# Patient Record
Sex: FEMALE | Race: WHITE | Employment: OTHER | URBAN - METROPOLITAN AREA
[De-identification: names, ages, dates, MRNs, and addresses within clinical notes are randomized per-mention and may not be internally consistent; named-entity substitution may affect disease eponyms.]

---

## 2017-02-08 ENCOUNTER — OFFICE VISIT (OUTPATIENT)
Dept: FAMILY MEDICINE | Facility: CLINIC | Age: 32
End: 2017-02-08
Payer: COMMERCIAL

## 2017-02-08 VITALS
SYSTOLIC BLOOD PRESSURE: 110 MMHG | TEMPERATURE: 98.4 F | HEIGHT: 66 IN | HEART RATE: 60 BPM | WEIGHT: 125 LBS | DIASTOLIC BLOOD PRESSURE: 68 MMHG | BODY MASS INDEX: 20.09 KG/M2

## 2017-02-08 DIAGNOSIS — N80.9 ENDOMETRIOSIS: ICD-10-CM

## 2017-02-08 DIAGNOSIS — Z00.01 ENCOUNTER FOR GENERAL ADULT MEDICAL EXAMINATION WITH ABNORMAL FINDINGS: Primary | ICD-10-CM

## 2017-02-08 LAB
CHOLEST SERPL-MCNC: 142 MG/DL
GLUCOSE SERPL-MCNC: 82 MG/DL (ref 70–99)
HDLC SERPL-MCNC: 60 MG/DL
LDLC SERPL CALC-MCNC: 73 MG/DL
NONHDLC SERPL-MCNC: 82 MG/DL
TRIGL SERPL-MCNC: 46 MG/DL

## 2017-02-08 PROCEDURE — 82947 ASSAY GLUCOSE BLOOD QUANT: CPT | Performed by: NURSE PRACTITIONER

## 2017-02-08 PROCEDURE — 87624 HPV HI-RISK TYP POOLED RSLT: CPT | Performed by: NURSE PRACTITIONER

## 2017-02-08 PROCEDURE — 99385 PREV VISIT NEW AGE 18-39: CPT | Performed by: NURSE PRACTITIONER

## 2017-02-08 PROCEDURE — 99213 OFFICE O/P EST LOW 20 MIN: CPT | Mod: 25 | Performed by: NURSE PRACTITIONER

## 2017-02-08 PROCEDURE — 80061 LIPID PANEL: CPT | Performed by: NURSE PRACTITIONER

## 2017-02-08 PROCEDURE — G0145 SCR C/V CYTO,THINLAYER,RESCR: HCPCS | Performed by: NURSE PRACTITIONER

## 2017-02-08 PROCEDURE — 36415 COLL VENOUS BLD VENIPUNCTURE: CPT | Performed by: NURSE PRACTITIONER

## 2017-02-08 NOTE — LETTER
[address] [address]  [phone]      February 16, 2017    Debbie Gage  [address] [place] [address]    Dear Debbie,  We are happy to inform you that your PAP smear result from 2/8/17 is normal.  We are now able to do a follow up test on PAP smears. The DNA test is for HPV (Human Papilloma Virus). Cervical cancer is closely linked with certain types of HPV. Your result showed no evidence of high risk HPV.  Therefore we recommend you return in 3 years for your next pap smear and HPV test.  You will still need to return to the clinic every year for an annual exam and other preventive tests.  Please contact the clinic with any questions.  Sincerely,  CHRISSY Smith CNP/rlm

## 2017-02-08 NOTE — Clinical Note
[address] [address]  Phone: [phone]  Fax: [phone]          February 10, 2017    Debbie Gage  [address] [facility] [address]          Dear Debbie,      LAB RESULTS:     Your blood sugar and lipid profile are well within normal limits.    If you have any questions/concerns, feel free to call the clinic.    Sincerely,      Shelli Morrison, NP care team

## 2017-02-08 NOTE — PROGRESS NOTES
SUBJECTIVE:     CC: Debbie Gage is an 31 year old woman who presents for preventive health visit.     Healthy Habits:    Do you get at least three servings of calcium containing foods daily (dairy, green leafy vegetables, etc.)? yes    Amount of exercise or daily activities, outside of work: running with children, works at     Problems taking medications regularly No    Medication side effects: No    Have you had an eye exam in the past two years? no    Do you see a dentist twice per year? yes    Do you have sleep apnea, excessive snoring or daytime drowsiness?no        \    Today's PHQ-2 Score:   PHQ-2 ( 1999 [employer]) 2/8/2017 10/10/2011   Q1: Little interest or pleasure in doing things 0 0   Q2: Feeling down, depressed or hopeless 0 0   PHQ-2 Score 0 0       Abuse: Current or Past(Physical, Sexual or Emotional)- No  Do you feel safe in your environment - Yes    Social History   Substance Use Topics     Smoking status: Never Smoker      Smokeless tobacco: Current User     Alcohol Use: 0.0 oz/week     0 Standard drinks or equivalent per week      Comment: occas     The patient does not drink >3 drinks per day nor >7 drinks per week.    No results for input(s): CHOL, HDL, LDL, TRIG, CHOLHDLRATIO, NHDL in the last 32308 hours.    Reviewed orders with patient.  Reviewed health maintenance and updated orders accordingly - Yes    Mammo Decision Support:  Mammogram not appropriate for this patient based on age.    Pertinent mammograms are reviewed under the imaging tab.  History of abnormal Pap smear: History of abnormal Pap smear, had LEEP procedure in 2010. She doesn't recall the results of that biopsy, but did have all of her follow-up Pap smears, all of which were negative. She has been back on routine screening  All Histories reviewed and updated in Epic.  Past Medical History   Diagnosis Date     Abnormal Pap smear of cervix      LEEP DONE 2009     Endometriosis       Past Surgical History    Procedure Laterality Date     Gyn surgery       laparoscopy x 4     Orthopedic surgery       ankle edwin surgery (tendon repair)     Ent surgery       wisdom teeth extraction     As enlarge breast with implant        silicone implants     Cystectomy ovarian benign       Leep tx, cervical  2010      section       X2     Obstetric History       T0      TAB0   SAB0   E0   M0   L2       # Outcome Date GA Lbr Justino/2nd Weight Sex Delivery Anes PTL Lv   2 Para            1 Para                   ROS:  C: NEGATIVE for fever, chills, change in weight  I: NEGATIVE for worrisome rashes, moles or lesions  E: NEGATIVE for vision changes or irritation  ENT: NEGATIVE for ear, mouth and throat problems  R: NEGATIVE for significant cough or SOB  B: NEGATIVE for masses, tenderness or discharge  CV: NEGATIVE for chest pain, palpitations or peripheral edema  GI: NEGATIVE for nausea, abdominal pain, heartburn, or change in bowel habits   female: POSITIVE for history of endometriosis with chronic pelvic pain.  M: NEGATIVE for significant arthralgias or myalgia  N: NEGATIVE for weakness, dizziness or paresthesias  P: NEGATIVE for changes in mood or affect    She reports history of endometriosis dating back to her teen years. At one time was on Desogen which seemed to work well managing her symptoms. She has had 3 or 4 laparoscopies, at one time states endometrial tissue had attached  To her ovary and to her bowel. Symptoms resolved with her first pregnancy, but when the child was about a year old symptoms recurred. She was given options of managing with Lupron, oral contraceptives, or pregnancy. She tried birth control pills once again, but developed severe headaches in response to any oral contraceptives. She did become pregnant, symptoms once again resolved until the child was about a year and half old. Presently is having a lot of pelvic pain. Would like referral to gynecology.  She is uncertain as to just  "what she is wanting to proceed with as far as management. She isn't absolutely certain she doesn't want more children, states they still consider having another child. However, the chronic pain is very difficult to live with as well. She would like consultation to discuss in more detail the different options available to her    Problem list, Medication list, Allergies, and Medical/Social/Surgical histories reviewed in [facility] and updated as appropriate.  OBJECTIVE:     /68 mmHg  Pulse 60  Temp(Src) 98.4  F (36.9  C) (Tympanic)  Ht 5' 5.6\" (1.666 m)  Wt 125 lb (56.7 kg)  BMI 20.43 kg/m2  LMP 02/01/2017  EXAM:  GENERAL: healthy, alert and no distress  EYES: Eyes grossly normal to inspection, PERRL and conjunctivae and sclerae normal  HENT: ear canals and TM's normal, nose and mouth without ulcers or lesions  NECK: no adenopathy, no asymmetry, masses, or scars and thyroid normal to palpation  RESP: lungs clear to auscultation - no rales, rhonchi or wheezes  BREAST: normal without masses, tenderness or nipple discharge and no palpable axillary masses or adenopathy  CV: regular rate and rhythm, normal S1 S2, no S3 or S4, no murmur, click or rub, no peripheral edema and peripheral pulses strong  ABDOMEN: Soft, flat. Bowel sounds present throughout. No masses, no organomegaly. Patient is very tender across the entire lower abdomen and pelvis to palpation and percussion   (female): Normal external genitalia, BUS. Vaginal mucosa appears normal, well rugated, without lesion. Cervix is displaced to the lower right. Smooth, round, without polyp or lesion. No friability. Bimanual exam is negative for cervical motion tenderness. Very tender across the lower pelvic segment.  MS: no gross musculoskeletal defects noted, no edema  SKIN: no suspicious lesions or rashes  NEURO: Normal strength and tone, mentation intact and speech normal  PSYCH: mentation appears normal, affect normal/bright    ASSESSMENT/PLAN:     1. " "Encounter for general adult medical examination with abnormal findings  Recommend annual well exam with Pap and HPV testing every 5 years if negative.  - Lipid panel reflex to direct LDL  - Glucose  - Pap imaged thin layer screen with HPV - recommended age 30 - 65 years (select HPV order below)  - HPV High Risk Types DNA Cervical    2. Endometriosis  - OB/GYN REFERRAL  - US Pelvic Complete w Transvaginal; Future    COUNSELING:   Reviewed preventive health counseling, as reflected in patient instructions       Regular exercise       Healthy diet/nutrition       Osteoporosis Prevention/Bone Health         reports that she has never smoked. She uses smokeless tobacco.  Tobacco Cessation Action Plan: Information offered: Patient not interested at this time  Estimated body mass index is 20.43 kg/(m^2) as calculated from the following:    Height as of this encounter: 5' 5.6\" (1.666 m).    Weight as of this encounter: 125 lb (56.7 kg).       Counseling Resources:  ATP IV Guidelines  Pooled Cohorts Equation Calculator  Breast Cancer Risk Calculator  FRAX Risk Assessment  ICSI Preventive Guidelines  Dietary Guidelines for Americans, 2010  USDA's MyPlate  ASA Prophylaxis  Lung CA Screening    CHRISSY Smith CNP  [facility]  "

## 2017-02-08 NOTE — NURSING NOTE
"Chief Complaint   Patient presents with     Physical       Initial /68 mmHg  Pulse 60  Temp(Src) 98.4  F (36.9  C) (Tympanic)  Ht 5' 5.6\" (1.666 m)  Wt 125 lb (56.7 kg)  BMI 20.43 kg/m2  LMP 02/01/2017 Estimated body mass index is 20.43 kg/(m^2) as calculated from the following:    Height as of this encounter: 5' 5.6\" (1.666 m).    Weight as of this encounter: 125 lb (56.7 kg).  Medication Reconciliation: complete  "

## 2017-02-08 NOTE — MR AVS SNAPSHOT
After Visit Summary   2/8/2017    Debbie Gage    MRN: [id number]           Patient Information     Date Of Birth          1985        Visit Information        Provider Department      2/8/2017 9:00 AM Shelli Morrison APRN [facility]        Today's Diagnoses     Encounter for general adult medical examination with abnormal findings    -  1     Endometriosis           Care Instructions      Preventive Health Recommendations  Female Ages 26 - 39  Yearly exam:   See your health care provider every year in order to    Review health changes.     Discuss preventive care.      Review your medicines if you your doctor has prescribed any.    Until age 30: Get a Pap test every three years (more often if you have had an abnormal result).    After age 30: Talk to your doctor about whether you should have a Pap test every 3 years or have a Pap test with HPV screening every 5 years.   You do not need a Pap test if your uterus was removed (hysterectomy) and you have not had cancer.  You should be tested each year for STDs (sexually transmitted diseases), if you're at risk.   Talk to your provider about how often to have your cholesterol checked.  If you are at risk for diabetes, you should have a diabetes test (fasting glucose).  Shots: Get a flu shot each year. Get a tetanus shot every 10 years.   Nutrition:     Eat at least 5 servings of fruits and vegetables each day.    Eat whole-grain bread, whole-wheat pasta and brown rice instead of white grains and rice.    Talk to your provider about Calcium and Vitamin D.     Lifestyle    Exercise at least 150 minutes a week (30 minutes a day, 5 days of the week). This will help you control your weight and prevent disease.    Limit alcohol to one drink per day.    No smoking.     Wear sunscreen to prevent skin cancer.    See your dentist every six months for an exam and cleaning.            Follow-ups after your visit        Additional  Services     OB/GYN REFERRAL       Your provider has referred you to:  [facility]: [facility] [place] [phone]   [email or web address]    Please be aware that coverage of these services is subject to the terms and limitations of your health insurance plan.  Call member services at your health plan with any benefit or coverage questions.      Please bring the following with you to your appointment:    (1) Any X-Rays, CTs or MRIs which have been performed.  Contact the facility where they were done to arrange for  prior to your scheduled appointment.   (2) List of current medications   (3) This referral request   (4) Any documents/labs given to you for this referral                  Your next 10 appointments already scheduled     Feb 15, 2017  2:10 PM   US PELVIC COMPLETE W TRANSVAGINAL with PHUS1   [facility] ([facility])    [address] [place] [address] [phone]           Please bring a list of your medicines (including vitamins, minerals and over-the-counter drugs). Also, tell your doctor about any allergies you may have. Wear comfortable clothes and leave your valuables at home.  Adults: Drink four 8-ounce glasses of fluid one hour before your exam. Do NOT empty your bladder.  If you need to empty your bladder before your exam, try to release only a little bit of urine. Then, drink another 8oz glass of fluid.  Children: Children who are potty trained should drink at least 4 cups (32 oz) of liquid 45 minutes to one hour prior to the exam. The child s bladder must be full in order to achieve a diagnostic exam. If your child is very uncomfortable or has an urgent need to pee, please notify a technologist; they will try to find out how much longer the wait may be and provide instructions to help relieve the pressure. Occasionally it is medically necessary to insert a urinary catheter to fill the bladder.   "Please call the Imaging Department at your exam site with any questions.            Feb 22, 2017 10:10 AM   [facility]    [address] [place] [address] [phone]           Bring a current list of meds and any records pertaining to this visit.  For Physicals, please bring immunization records and any forms needing to be filled out.  Please arrive 10 minutes early to complete paperwork.              Future tests that were ordered for you today     Open Future Orders        Priority Expected Expires Ordered    US Pelvic Complete w Transvaginal Routine  2/8/2018 2/8/2017            Who to contact     If you have questions or need follow up information about today's clinic visit or your schedule please contact [facility] directly at [phone].  Normal or non-critical lab and imaging results will be communicated to you by MyChart, letter or phone within 4 business days after the clinic has received the results. If you do not hear from us within 7 days, please contact the clinic through MyChart or phone. If you have a critical or abnormal lab result, we will notify you by phone as soon as possible.  Submit refill requests through HowGood or call your pharmacy and they will forward the refill request to us. Please allow 3 business days for your refill to be completed.          Additional Information About Your Visit        MyCharAltia Systems Information     HowGood lets you send messages to your doctor, view your test results, renew your prescriptions, schedule appointments and more. To sign up, go to [email or web address] . Click on \"Log in\" on the left side of the screen, which will take you to the Welcome page. Then click on \"Sign up Now\" on the right side of the page.     You will be asked to enter the access code listed below, as well as some personal information. Please follow the directions to " "create your username and password.     Your access code is: [email or web address]  Expires: 2017  9:48 AM     Your access code will  in 90 days. If you need help or a new code, please call your [place] clinic or [phone].        Care EveryWhere ID     This is your Care EveryWhere ID. This could be used by other organizations to access your [place] medical records  [id number]        Your Vitals Were     Pulse Temperature Height BMI (Body Mass Index) Last Period       60 98.4  F (36.9  C) (Tympanic) 5' 5.6\" (1.666 m) 20.43 kg/m2 2017        Blood Pressure from Last 3 Encounters:   17 110/68   16 116/73   16 97/57    Weight from Last 3 Encounters:   17 125 lb (56.7 kg)   16 120 lb (54.432 kg)   16 119 lb 11.2 oz (54.296 kg)              We Performed the Following     Glucose     HPV High Risk Types DNA Cervical     Lipid panel reflex to direct LDL     OB/GYN REFERRAL     Pap imaged thin layer screen with HPV - recommended age 30 - 65 years (select HPV order below)        Primary Care Provider Office Phone # Fax #    CHRISSY Smith [facility] [phone]       [facility]  [facility] [address] [place] [address]        Thank you!     Thank you for choosing [facility]  for your care. Our goal is always to provide you with excellent care. Hearing back from our patients is one way we can continue to improve our services. Please take a few minutes to complete the written survey that you may receive in the mail after your visit with us. Thank you!             Your Updated Medication List - Protect others around you: Learn how to safely use, store and throw away your medicines at www.disposemymeds.org.          This list is accurate as of: 17  9:48 AM.  Always use your most recent med list.                   Brand Name Dispense Instructions for use    NO ACTIVE MEDICATIONS            "

## 2017-02-10 LAB
COPATH REPORT: NORMAL
PAP: NORMAL

## 2017-02-13 LAB
FINAL DIAGNOSIS: NORMAL
HPV HR 12 DNA CVX QL NAA+PROBE: NEGATIVE
HPV16 DNA SPEC QL NAA+PROBE: NEGATIVE
HPV18 DNA SPEC QL NAA+PROBE: NEGATIVE
SPECIMEN DESCRIPTION: NORMAL

## 2017-02-22 ENCOUNTER — HOSPITAL ENCOUNTER (OUTPATIENT)
Dept: ULTRASOUND IMAGING | Facility: CLINIC | Age: 32
Discharge: HOME | End: 2017-02-22
Attending: NURSE PRACTITIONER | Admitting: NURSE PRACTITIONER
Payer: COMMERCIAL

## 2017-02-22 ENCOUNTER — TELEPHONE (OUTPATIENT)
Dept: OBGYN | Facility: CLINIC | Age: 32
End: 2017-02-22

## 2017-02-22 ENCOUNTER — OFFICE VISIT (OUTPATIENT)
Dept: FAMILY MEDICINE | Facility: CLINIC | Age: 32
End: 2017-02-22
Payer: COMMERCIAL

## 2017-02-22 VITALS
TEMPERATURE: 98 F | HEART RATE: 94 BPM | WEIGHT: 124 LBS | SYSTOLIC BLOOD PRESSURE: 82 MMHG | RESPIRATION RATE: 16 BRPM | DIASTOLIC BLOOD PRESSURE: 54 MMHG | OXYGEN SATURATION: 100 % | BODY MASS INDEX: 20.26 KG/M2

## 2017-02-22 DIAGNOSIS — N80.9 ENDOMETRIOSIS: Primary | ICD-10-CM

## 2017-02-22 DIAGNOSIS — R68.89 FLU-LIKE SYMPTOMS: ICD-10-CM

## 2017-02-22 DIAGNOSIS — N80.9 ENDOMETRIOSIS: ICD-10-CM

## 2017-02-22 DIAGNOSIS — R10.2 PELVIC PAIN IN FEMALE: Primary | ICD-10-CM

## 2017-02-22 DIAGNOSIS — R07.0 THROAT PAIN: ICD-10-CM

## 2017-02-22 LAB
DEPRECATED S PYO AG THROAT QL EIA: NORMAL
FLUAV+FLUBV AG SPEC QL: NEGATIVE
FLUAV+FLUBV AG SPEC QL: NORMAL
MICRO REPORT STATUS: NORMAL
SPECIMEN SOURCE: NORMAL
SPECIMEN SOURCE: NORMAL

## 2017-02-22 PROCEDURE — 76830 TRANSVAGINAL US NON-OB: CPT

## 2017-02-22 PROCEDURE — 87804 INFLUENZA ASSAY W/OPTIC: CPT | Performed by: OBSTETRICS & GYNECOLOGY

## 2017-02-22 PROCEDURE — 87081 CULTURE SCREEN ONLY: CPT | Performed by: OBSTETRICS & GYNECOLOGY

## 2017-02-22 PROCEDURE — 87880 STREP A ASSAY W/OPTIC: CPT | Performed by: OBSTETRICS & GYNECOLOGY

## 2017-02-22 PROCEDURE — 99215 OFFICE O/P EST HI 40 MIN: CPT | Performed by: OBSTETRICS & GYNECOLOGY

## 2017-02-22 ASSESSMENT — PAIN SCALES - GENERAL: PAINLEVEL: MODERATE PAIN (4)

## 2017-02-22 NOTE — MR AVS SNAPSHOT
"              After Visit Summary   2/22/2017    Debbie Gage    MRN: [id number]           Patient Information     Date Of Birth          1985        Visit Information        Provider Department      2/22/2017 10:10 AM Ranjith King MD [facility]        Today's Diagnoses     Flu-like symptoms    -  1    Throat pain           Follow-ups after your visit        Your next 10 appointments already scheduled     Mar 01, 2017  9:15 AM CST   Pre-Op physical with CHRISSY Smith CNP   [facility] ([facility])    [address] [place] [id number] [phone]            Mar 09, 2017 10:10 AM CST   SHORT with Ranjith King MD   [facility] ([address] [address] [phone]              Who to contact     If you have questions or need follow up information about today's clinic visit or your schedule please contact [facility] directly at [phone].  Normal or non-critical lab and imaging results will be communicated to you by Ecoviatehart, letter or phone within 4 business days after the clinic has received the results. If you do not hear from us within 7 days, please contact the clinic through Ecoviatehart or phone. If you have a critical or abnormal lab result, we will notify you by phone as soon as possible.  Submit refill requests through Butter or call your pharmacy and they will forward the refill request to us. Please allow 3 business days for your refill to be completed.          Additional Information About Your Visit        Ecoviatehart Information     Butter lets you send messages to your doctor, view your test results, renew your prescriptions, schedule appointments and more. To sign up, go to [email or web address] . Click on \"Log in\" on the left side of the screen, which will take you to the Welcome page. Then click on \"Sign up Now\" on " the right side of the page.     You will be asked to enter the access code listed below, as well as some personal information. Please follow the directions to create your username and password.     Your access code is: [email or web address]  Expires: 2017  9:48 AM     Your access code will  in 90 days. If you need help or a new code, please call your [place] clinic or [phone].        Care EveryWhere ID     This is your Care EveryWhere ID. This could be used by other organizations to access your [place] medical records  [id number]        Your Vitals Were     Pulse Temperature Respirations Last Period Pulse Oximetry Breastfeeding?    94 98  F (36.7  C) (Tympanic) 16 2017 100% No    BMI (Body Mass Index)                   20.26 kg/m2            Blood Pressure from Last 3 Encounters:   17 (!) 82/54   17 110/68   16 116/73    Weight from Last 3 Encounters:   17 124 lb (56.2 kg)   17 125 lb (56.7 kg)   16 120 lb (54.4 kg)              We Performed the Following     Beta strep group A culture     Influenza A/B antigen     Strep, Rapid Screen        Primary Care Provider Office Phone # Fax #    CHRISSY Smith [facility] [phone]       [facility]  [facility] [address] [place] [address]        Thank you!     Thank you for choosing [facility]  for your care. Our goal is always to provide you with excellent care. Hearing back from our patients is one way we can continue to improve our services. Please take a few minutes to complete the written survey that you may receive in the mail after your visit with us. Thank you!             Your Updated Medication List - Protect others around you: Learn how to safely use, store and throw away your medicines at www.disposemymeds.org.          This list is accurate as of: 17 11:12 AM.  Always use your most recent med list.                   Brand Name Dispense Instructions for use     DAYQUIL LIQUICAPS OR

## 2017-02-22 NOTE — TELEPHONE ENCOUNTER
Surgery Scheduled    Date of Surgery 3/6/17 Time of Surgery 8:00am  Procedure: Laparoscopy, Hysteroscopy, Dilation and Curettage and insertion of Mirana IUD  Hospital/Surgical Facility: [facility]  Surgeon: Dr. King, with Dr. Maria Assisting  Type of Anesthesia Anticipated: GETA  Pre-Op: 3/1/17 with Shelli Morrison   Pre-Op Labs: 3/4/17  Post-Op: 3/9/17 with Dr. King  Consent Signed 2/22/17      Surgery packet given to patient at consent signing. Patient instructed to arrive 1 1/2 hour(s) prior to surgery.  Patient understood and agrees to the plan.      Inder Hetcor MA

## 2017-02-22 NOTE — PROGRESS NOTES
SUBJECTIVE:                                                      Referral from Shelli Morrison       Reason for consultation:  Pelvic pain, history of endometriosis    History:  Debbie  Is a 31 year old female  2 para ( 2 CS )   who presents today because of pelvic pain for the past few months which is worse lately. She has a history of endometriosis which was first dx at age 17. Her first laparoscopy was at age 18. It showed moderate disease. She has tried different oral contraceptives. At least 4 times. Nothing has seen to help. Her last laparoscopy was in   And it showed a softball sized endometrioma on the right adnexa. It was removed and afterwards she conceived. The endometriosis pain was  Markedly improved during pregnancies and for about a year after each delivery she felt fine. Her last delivery was  she had another year of lupus but over the past few months since last fall  She has pain on the right side in the pelvis.  She had an ultrasound yesterday and had significant pelvic pain during that test and therefore she declines a pelvic exam today      Recent pelvic US showed: (this is my summary of the findings) :  The uterus measures 8.5 x 4.3 x 5.7 cm the endometrium is 6 mm and the ovaries look normal.         She was offered Lupron in the past and she declined. She was advised to consider conception  As a treatment. She is seriously considering that currently.    Also she has a runny nose and head congestion and a mild cough and sore throat and so she wants strep and influenza testing today        Patient Active Problem List   Diagnosis     Endometriosis     S/P LEEP of cervix     Past Surgical History   Procedure Laterality Date     Gyn surgery       laparoscopy x 4     Orthopedic surgery       ankle edwin surgery (tendon repair)     Ent surgery       wisdom teeth extraction     As enlarge breast with implant        silicone implants     Cystectomy ovarian benign        Leep tx, cervical  2010      section       X2       Social History   Substance Use Topics     Smoking status: Never Smoker     Smokeless tobacco: Current User     Alcohol use 0.0 oz/week     0 Standard drinks or equivalent per week      Comment: occas     Family History   Problem Relation Age of Onset     Other Cancer Maternal Grandmother      ovarian     DIABETES Maternal Grandmother      DIABETES Maternal Grandfather      Colon Cancer Paternal Grandfather          Current Outpatient Prescriptions   Medication Sig Dispense Refill     Pseudoephedrine-DM-GG-APAP (DAYQUIL LIQUICAPS OR)          ROS:  A 12 point systems review is negative except for what is listed above in the Subjective history.            OBJECTIVE:                                                    Vital signs: Blood pressure (!) 82/54, pulse 94, temperature 98  F (36.7  C), temperature source Tympanic, resp. rate 16, weight 124 lb (56.2 kg), last menstrual period 2017, SpO2 100 %, not currently breastfeeding.        HEENT is normal.      Neck is supple, mobile, no adenoapthy or masses palpable. Normal range of motion noted.     Chest is clear to auscultation. No wheezes, rales or rhonchi heard.  cardiac exam is normal with s1, s2, no murmurs or adventitious sounds.Normal rate and rhythm is heard.     Abdomen is soft,  nondistended, No masses felt.No HSM. No guarding or rigidity or rebound noted. Palpation reveals   tenderness  iin both lower quadrants especially on the right side. Normal bowel sounds heard.     Pelvic exam: sshe declined an exam today      Rapid strep test is negaive  The rapid flu test result is negative             ASSESSMENT/PLAN:                                                    1.31-year-old female  2 para 2 with a history of endometriosis which was diagnosed by laparoscopy. She has recurrent pain especially in the right adnexal area. I suspect she has recurrent endometriosis.        She probably has  viral URI- reassured about that                                                              A total of 45 minutes were spent face-to-face with this patient during today's consultation, with more than 50% of that time devoted to conversation and counseling about the management decisions.    We discussed options for treatment including pregnancy, Mirena IUD, depo=provera, and hysterectomy/BSO. She wants a laparoscopy to see if we can treat her more intense right pelvic pain.then she wants the Mirena IUD. Therefore i suggested laparoscopy and Hysteroscopy, dilatation and curettage with Mirena IUD at the same time.    Laparoscopy was discussed with the patient in detail today. The purpose of the surgery is to treat her pain/endometriosis and also confirm the diagnosis- she knows she may lose the right ovary if we see severe cyst or other finding on that side-or even on the left side since that hurts also.  Risks include but are not limited to bleeding, infection, injury to bowel and bladder and other organs. There is a chance that laparotomy will be needed if I suspect that an injury has occurred or if I see a problems such as a bleeding ovary or a situation that needs to be addressed urgently.She read over the consent form and signed it , witnessed by my nurse. I offered her a second opinion.     The patient was give a diagram describing the d and c procedure and then we went over the consent form together. We discussed risks which include but are not limited to bleeding, infection, possible uterine perforation, possible need for laparoscopy or laparotomy, possible anesthesia risks, and possible inability to dilate the uterus due to anatomical considerations. She signed the consent, witnessed by my nurse. She will have a preop physical with Shelli VILLASENOR  and she knows to be NPO for 8 hours before surgery and to bring a .        Thank you for this consultation.    Copy to  Shelli Morrison  Fernie King MD  [facility]

## 2017-02-22 NOTE — NURSING NOTE
"Chief Complaint   Patient presents with     Consult     endometriosis       Initial BP (!) 82/54 (BP Location: Right arm, Patient Position: Chair, Cuff Size: Adult Regular)  Pulse 94  Temp 98  F (36.7  C) (Tympanic)  Resp 16  Wt 124 lb (56.2 kg)  LMP 02/01/2017  SpO2 100%  Breastfeeding? No  BMI 20.26 kg/m2 Estimated body mass index is 20.26 kg/(m^2) as calculated from the following:    Height as of 2/8/17: 5' 5.6\" (1.666 m).    Weight as of this encounter: 124 lb (56.2 kg).  Medication Reconciliation: complete   Inder Hector MA      "

## 2017-02-24 LAB
BACTERIA SPEC CULT: NORMAL
MICRO REPORT STATUS: NORMAL
SPECIMEN SOURCE: NORMAL

## 2017-03-01 ENCOUNTER — OFFICE VISIT (OUTPATIENT)
Dept: FAMILY MEDICINE | Facility: CLINIC | Age: 32
End: 2017-03-01
Payer: COMMERCIAL

## 2017-03-01 VITALS
TEMPERATURE: 98.8 F | DIASTOLIC BLOOD PRESSURE: 62 MMHG | BODY MASS INDEX: 20.26 KG/M2 | HEART RATE: 80 BPM | WEIGHT: 124 LBS | SYSTOLIC BLOOD PRESSURE: 90 MMHG

## 2017-03-01 DIAGNOSIS — Z01.818 PREOP GENERAL PHYSICAL EXAM: Primary | ICD-10-CM

## 2017-03-01 DIAGNOSIS — N80.9 ENDOMETRIOSIS: ICD-10-CM

## 2017-03-01 DIAGNOSIS — J20.9 ACUTE BRONCHITIS, UNSPECIFIED ORGANISM: ICD-10-CM

## 2017-03-01 LAB
ERYTHROCYTE [DISTWIDTH] IN BLOOD BY AUTOMATED COUNT: 12.9 % (ref 10–15)
HCT VFR BLD AUTO: 41.2 % (ref 35–47)
HGB BLD-MCNC: 13.7 G/DL (ref 11.7–15.7)
MCH RBC QN AUTO: 29 PG (ref 26.5–33)
MCHC RBC AUTO-ENTMCNC: 33.3 G/DL (ref 31.5–36.5)
MCV RBC AUTO: 87 FL (ref 78–100)
PLATELET # BLD AUTO: 274 10E9/L (ref 150–450)
RBC # BLD AUTO: 4.72 10E12/L (ref 3.8–5.2)
WBC # BLD AUTO: 4.5 10E9/L (ref 4–11)

## 2017-03-01 PROCEDURE — 85027 COMPLETE CBC AUTOMATED: CPT | Performed by: NURSE PRACTITIONER

## 2017-03-01 PROCEDURE — 99214 OFFICE O/P EST MOD 30 MIN: CPT | Performed by: NURSE PRACTITIONER

## 2017-03-01 PROCEDURE — 36415 COLL VENOUS BLD VENIPUNCTURE: CPT | Performed by: NURSE PRACTITIONER

## 2017-03-01 RX ORDER — AZITHROMYCIN 250 MG/1
TABLET, FILM COATED ORAL
Qty: 6 TABLET | Refills: 0 | Status: ON HOLD | OUTPATIENT
Start: 2017-03-01 | End: 2017-03-06

## 2017-03-01 NOTE — NURSING NOTE
"Chief Complaint   Patient presents with     Pre-Op Exam       Initial LMP 02/01/2017 Estimated body mass index is 20.26 kg/(m^2) as calculated from the following:    Height as of 2/8/17: 5' 5.6\" (1.666 m).    Weight as of 2/22/17: 124 lb (56.2 kg).  Medication Reconciliation: complete  "

## 2017-03-01 NOTE — MR AVS SNAPSHOT
After Visit Summary   3/1/2017    Debbie Gage    MRN: [id number]           Patient Information     Date Of Birth          1985        Visit Information        Provider Department      3/1/2017 9:15 AM Shelli Morrison APRN [facility]        Today's Diagnoses     Preop general physical exam    -  1    Endometriosis        Acute bronchitis, unspecified organism          Care Instructions      Before Your Surgery      Call your surgeon if there is any change in your health. This includes signs of a cold or flu (such as a sore throat, runny nose, cough, rash or fever).    Do not smoke, drink alcohol or take over the counter medicine (unless your surgeon or primary care doctor tells you to) for the 24 hours before and after surgery.    If you take prescribed drugs: Follow your doctor s orders about which medicines to take and which to stop until after surgery.    Eating and drinking prior to surgery: follow the instructions from your surgeon    Take a shower or bath the night before surgery. Use the soap your surgeon gave you to gently clean your skin. If you do not have soap from your surgeon, use your regular soap. Do not shave or scrub the surgery site.  Wear clean pajamas and have clean sheets on your bed.         Follow-ups after your visit        Your next 10 appointments already scheduled     Mar 04, 2017  8:00 AM CST   LAB with NL LAB [facility]    [address] [place] [id number] [phone]           Patient must bring picture ID.  Patient should be prepared to give a urine specimen  Please do not eat 10-12 hours before your appointment if you are coming in fasting for labs on lipids, cholesterol, or glucose (sugar).  Pregnant women should follow their Care Team instructions. Water with medications is okay. Do not drink coffee or other fluids.   If you have concerns about taking  your medications,  "please ask at office or if scheduling via Pro V&V, send a message by clicking on Secure Messaging, Message Your Care Team.            Mar 06, 2017   Procedure with Ranjith King MD   [facility] ([facility])    [address]  [place] [id number] [phone]           From [address]: Exit at [address] on south side of [place]. Turn right on [address]. Turn left at stoplight on [address]. [facility] will be in view two blocks ahead            Mar 09, 2017 10:10 AM CST   SHORT with Ranjith King MD   [facility] ([facility])    [address]  [place] [id number] [phone]              Who to contact     If you have questions or need follow up information about today's clinic visit or your schedule please contact [facility] directly at [phone].  Normal or non-critical lab and imaging results will be communicated to you by CEINThart, letter or phone within 4 business days after the clinic has received the results. If you do not hear from us within 7 days, please contact the clinic through Etixt or phone. If you have a critical or abnormal lab result, we will notify you by phone as soon as possible.  Submit refill requests through Pro V&V or call your pharmacy and they will forward the refill request to us. Please allow 3 business days for your refill to be completed.          Additional Information About Your Visit        Pro V&V Information     Pro V&V lets you send messages to your doctor, view your test results, renew your prescriptions, schedule appointments and more. To sign up, go to [email or web address] . Click on \"Log in\" on the left side of the screen, which will take you to the Welcome page. Then click on \"Sign up Now\" on the right side of the page.     You will be asked to enter the access code listed below, as well as some personal " information. Please follow the directions to create your username and password.     Your access code is: [email or web address]  Expires: 2017  9:48 AM     Your access code will  in 90 days. If you need help or a new code, please call your [facility] clinic or [phone].        Care EveryWhere ID     This is your Care EveryWhere ID. This could be used by other organizations to access your [facility] medical records  [id number]        Your Vitals Were     Pulse Temperature Last Period BMI (Body Mass Index)          80 98.8  F (37.1  C) (Tympanic) 2017 20.26 kg/m2         Blood Pressure from Last 3 Encounters:   17 90/62   17 (!) 82/54   17 110/68    Weight from Last 3 Encounters:   17 124 lb (56.2 kg)   17 124 lb (56.2 kg)   17 125 lb (56.7 kg)              We Performed the Following     CBC with platelets          Today's Medication Changes          These changes are accurate as of: 3/1/17  9:51 AM.  If you have any questions, ask your nurse or doctor.               Start taking these medicines.        Dose/Directions    azithromycin 250 MG tablet   Commonly known as:  ZITHROMAX   Used for:  Acute bronchitis, unspecified organism   Started by:  Shelli Morrison APRN CNP        Two tablets first day, then one tablet daily for four days   Quantity:  6 tablet   Refills:  0            Where to get your medicines      These medications were sent to [facility] [place] [address]  [address] [facility] [address]     Phone:  [phone]     azithromycin 250 MG tablet                Primary Care Provider Office Phone # Fax #    CHRISSY Smith [phone]       [place] [address]  [facility] [address]        Thank you!     Thank you for choosing [facility]  for your care. Our goal is always to provide you with excellent care. Hearing back from our patients is one way we can  continue to improve our services. Please take a few minutes to complete the written survey that you may receive in the mail after your visit with us. Thank you!             Your Updated Medication List - Protect others around you: Learn how to safely use, store and throw away your medicines at www.disposemymeds.org.          This list is accurate as of: 3/1/17  9:51 AM.  Always use your most recent med list.                   Brand Name Dispense Instructions for use    azithromycin 250 MG tablet    ZITHROMAX    6 tablet    Two tablets first day, then one tablet daily for four days

## 2017-03-01 NOTE — PROGRESS NOTES
[address] [address]  [phone]  Dept: [phone]    PRE-OP EVALUATION:  Today's date: 3/1/2017    Debbie Gage (: 1985) presents for pre-operative evaluation assessment as requested by Dr. King.  She requires evaluation and anesthesia risk assessment prior to undergoing surgery/procedure for treatment of  Pelvic pain,  procedure: laparoscopy, hysteroscopy, dilation and curettage and insertion of mirena intrauterine device    Date of Surgery/ Procedure: 3/6/17  Time of Surgery/ Procedure: 820  Hospital/Surgical Facility: [facility]  Fax number for surgical facility:   Primary Physician: Shelli Morrison  Type of Anesthesia Anticipated: General    Patient has a Health Care Directive or Living Will:  NO    1. NO - Do you have a history of heart attack, stroke, stent, bypass or surgery on an artery in the head, neck, heart or legs?  2. NO - Do you ever have any pain or discomfort in your chest?  3. NO - Do you have a history of  Heart Failure?  4. NO - Are you troubled by shortness of breath when: walking on the level, up a slight hill or at night?  5. yes - Do you currently have a cold, bronchitis or other respiratory infection? Cold sx  6. yes - Do you have a cough, shortness of breath or wheezing? Coughing, productive cough, green in color  7. yes - Do you sometimes get pains in the calves of your legs when you walk? Right leg vericose vein  8. NO - Do you or anyone in your family have previous history of blood clots?  9. NO - Do you or does anyone in your family have a serious bleeding problem such as prolonged bleeding following surgeries or cuts?  10. NO - Have you ever had problems with anemia or been told to take iron pills?  11. NO - Have you had any abnormal blood loss such as black, tarry or bloody stools, or abnormal vaginal bleeding?  12. NO - Have you ever had a blood transfusion?  13. NO - Have you or any of your relatives ever had  "problems with anesthesia?  14. NO - Do you have sleep apnea, excessive snoring or daytime drowsiness?  15. NO - Do you have any prosthetic heart valves?  16. NO - Do you have prosthetic joints?  17. NO - Is there any chance that you may be pregnant?      HPI:                                                      Brief HPI related to upcoming procedure: Hx of endometriosis. Having chronic pain, last laparoscopic procedure was several years ago    She presently has respiratory infection, states she has a cough productive of green mucus. No chest pain or shortness of breath. No fever    MEDICAL HISTORY:                                                      Patient Active Problem List    Diagnosis Date Noted     S/P LEEP of cervix      Priority: Medium      Abnormal pap that required LEEP. Pathology unknown. \"did have all of her follow-up Pap smears, all of which were negative. She has been back on routine screening\" (per visit notes on 17)  12 NIL pap  17 NIL pap/neg HR HPV. Plan: cotest in 3 years.           Endometriosis 2017     Priority: Medium      Past Medical History   Diagnosis Date     Endometriosis      S/P LEEP (loop electrosurgical excision procedure) 2010     Past Surgical History   Procedure Laterality Date     Gyn surgery       laparoscopy x 4     Orthopedic surgery       ankle edwin surgery (tendon repair)     Ent surgery       wisdom teeth extraction     As enlarge breast with implant        silicone implants     Cystectomy ovarian benign       Leep tx, cervical  2010      section       X2     Current Outpatient Prescriptions   Medication Sig Dispense Refill     azithromycin (ZITHROMAX) 250 MG tablet Two tablets first day, then one tablet daily for four days 6 tablet 0     OTC products: none    Allergies   Allergen Reactions     Sulfa Drugs      Sulfa Drugs Hives      Latex Allergy: NO    Social History   Substance Use Topics     Smoking status: Never Smoker     Smokeless " tobacco: Current User     Alcohol use 0.0 oz/week     0 Standard drinks or equivalent per week      Comment: occas     History   Drug Use No       REVIEW OF SYSTEMS:                                                    C: NEGATIVE for fever, chills, change in weight  I: NEGATIVE for worrisome rashes, moles or lesions  E: NEGATIVE for vision changes or irritation  E/M: NEGATIVE for ear, mouth and throat problems  RESP:POSITIVE for cough-productive  CV: NEGATIVE for chest pain, palpitations or peripheral edema  GI: NEGATIVE for nausea, abdominal pain, heartburn, or change in bowel habits   female: History of endometriosis, chronic pelvic pain  M: NEGATIVE for significant arthralgias or myalgia  N: NEGATIVE for weakness, dizziness or paresthesias  E: NEGATIVE for temperature intolerance, skin/hair changes  H: NEGATIVE for bleeding problems  P: NEGATIVE for changes in mood or affect    EXAM:                                                    BP 90/62 (BP Location: Right arm, Patient Position: Chair, Cuff Size: Adult Regular)  Pulse 80  Temp 98.8  F (37.1  C) (Tympanic)  Wt 124 lb (56.2 kg)  LMP 02/01/2017  BMI 20.26 kg/m2    GENERAL APPEARANCE: healthy, alert and no distress     EYES: EOMI, PERRL     HENT: ear canals and TM's normal and nose and mouth without ulcers or lesions     NECK: no adenopathy, no asymmetry, masses, or scars and thyroid normal to palpation     RESP: lungs clear to auscultation - no rales, rhonchi or wheezes     CV: regular rates and rhythm, normal S1 S2, no S3 or S4 and no murmur, click or rub     ABDOMEN: Soft, nondistended. Bowel sounds present throughout. No masses, no organomegaly. Diffuse tenderness across the pelvic area     MS: extremities normal- no gross deformities noted, no evidence of inflammation in joints, FROM in all extremities.     SKIN: no suspicious lesions or rashes     NEURO: Normal strength and tone, sensory exam grossly normal, mentation intact and speech normal     PSYCH:  mentation appears normal. and affect normal/bright     LYMPHATICS: No axillary, cervical, or supraclavicular nodes    DIAGNOSTICS:                                                    Urine hCG has been ordered  Results for orders placed or performed in visit on 03/01/17 (from the past 24 hour(s))   CBC with platelets   Result Value Ref Range    WBC 4.5 4.0 - 11.0 10e9/L    RBC Count 4.72 3.8 - 5.2 10e12/L    Hemoglobin 13.7 11.7 - 15.7 g/dL    Hematocrit 41.2 35.0 - 47.0 %    MCV 87 78 - 100 fl    MCH 29.0 26.5 - 33.0 pg    MCHC 33.3 31.5 - 36.5 g/dL    RDW 12.9 10.0 - 15.0 %    Platelet Count 274 150 - 450 10e9/L           IMPRESSION:                                                    Reason for surgery/procedure: laparoscopy, hysteroscopy, dilation and curettage and insertion of mirena intrauterine device  Diagnosis/reason for consult: No medical contraindication noted to proceeding as scheduled     The proposed surgical procedure is considered INTERMEDIATE risk.    REVISED CARDIAC RISK INDEX  The patient has the following serious cardiovascular risks for perioperative complications such as (MI, PE, VFib and 3  AV Block):  No serious cardiac risks  INTERPRETATION: 0 risks: Class I (very low risk - 0.4% complication rate)    The patient has the following additional risks for perioperative complications:  No identified additional risks      ICD-10-CM    1. Preop general physical exam Z01.818 CBC with platelets   2. Endometriosis N80.9    3. Acute bronchitis, unspecified organism J20.9 azithromycin (ZITHROMAX) 250 MG tablet       RECOMMENDATIONS:                                                          APPROVAL GIVEN to proceed with proposed procedure, without further diagnostic evaluation       Signed Electronically by: CHRISSY Smith CNP    Copy of this evaluation report is provided to requesting physician.    [place] Preop Guidelines

## 2017-03-03 NOTE — H&P (VIEW-ONLY)
[address] [address]  [phone]  Dept: [phone]    PRE-OP EVALUATION:  Today's date: 3/1/2017    Debbie Gage (: 1985) presents for pre-operative evaluation assessment as requested by Dr. King.  She requires evaluation and anesthesia risk assessment prior to undergoing surgery/procedure for treatment of  Pelvic pain,  procedure: laparoscopy, hysteroscopy, dilation and curettage and insertion of mirena intrauterine device    Date of Surgery/ Procedure: 3/6/17  Time of Surgery/ Procedure: 820  Hospital/Surgical Facility: [facility]  Fax number for surgical facility:   Primary Physician: Shelli Morrison  Type of Anesthesia Anticipated: General    Patient has a Health Care Directive or Living Will:  NO    1. NO - Do you have a history of heart attack, stroke, stent, bypass or surgery on an artery in the head, neck, heart or legs?  2. NO - Do you ever have any pain or discomfort in your chest?  3. NO - Do you have a history of  Heart Failure?  4. NO - Are you troubled by shortness of breath when: walking on the level, up a slight hill or at night?  5. yes - Do you currently have a cold, bronchitis or other respiratory infection? Cold sx  6. yes - Do you have a cough, shortness of breath or wheezing? Coughing, productive cough, green in color  7. yes - Do you sometimes get pains in the calves of your legs when you walk? Right leg vericose vein  8. NO - Do you or anyone in your family have previous history of blood clots?  9. NO - Do you or does anyone in your family have a serious bleeding problem such as prolonged bleeding following surgeries or cuts?  10. NO - Have you ever had problems with anemia or been told to take iron pills?  11. NO - Have you had any abnormal blood loss such as black, tarry or bloody stools, or abnormal vaginal bleeding?  12. NO - Have you ever had a blood transfusion?  13. NO - Have you or any of your relatives ever had  "problems with anesthesia?  14. NO - Do you have sleep apnea, excessive snoring or daytime drowsiness?  15. NO - Do you have any prosthetic heart valves?  16. NO - Do you have prosthetic joints?  17. NO - Is there any chance that you may be pregnant?      HPI:                                                      Brief HPI related to upcoming procedure: Hx of endometriosis. Having chronic pain, last laparoscopic procedure was several years ago    She presently has respiratory infection, states she has a cough productive of green mucus. No chest pain or shortness of breath. No fever    MEDICAL HISTORY:                                                      Patient Active Problem List    Diagnosis Date Noted     S/P LEEP of cervix      Priority: Medium      Abnormal pap that required LEEP. Pathology unknown. \"did have all of her follow-up Pap smears, all of which were negative. She has been back on routine screening\" (per visit notes on 17)  12 NIL pap  17 NIL pap/neg HR HPV. Plan: cotest in 3 years.           Endometriosis 2017     Priority: Medium      Past Medical History   Diagnosis Date     Endometriosis      S/P LEEP (loop electrosurgical excision procedure) 2010     Past Surgical History   Procedure Laterality Date     Gyn surgery       laparoscopy x 4     Orthopedic surgery       ankle edwin surgery (tendon repair)     Ent surgery       wisdom teeth extraction     As enlarge breast with implant        silicone implants     Cystectomy ovarian benign       Leep tx, cervical  2010      section       X2     Current Outpatient Prescriptions   Medication Sig Dispense Refill     azithromycin (ZITHROMAX) 250 MG tablet Two tablets first day, then one tablet daily for four days 6 tablet 0     OTC products: none    Allergies   Allergen Reactions     Sulfa Drugs      Sulfa Drugs Hives      Latex Allergy: NO    Social History   Substance Use Topics     Smoking status: Never Smoker     Smokeless " tobacco: Current User     Alcohol use 0.0 oz/week     0 Standard drinks or equivalent per week      Comment: occas     History   Drug Use No       REVIEW OF SYSTEMS:                                                    C: NEGATIVE for fever, chills, change in weight  I: NEGATIVE for worrisome rashes, moles or lesions  E: NEGATIVE for vision changes or irritation  E/M: NEGATIVE for ear, mouth and throat problems  RESP:POSITIVE for cough-productive  CV: NEGATIVE for chest pain, palpitations or peripheral edema  GI: NEGATIVE for nausea, abdominal pain, heartburn, or change in bowel habits   female: History of endometriosis, chronic pelvic pain  M: NEGATIVE for significant arthralgias or myalgia  N: NEGATIVE for weakness, dizziness or paresthesias  E: NEGATIVE for temperature intolerance, skin/hair changes  H: NEGATIVE for bleeding problems  P: NEGATIVE for changes in mood or affect    EXAM:                                                    BP 90/62 (BP Location: Right arm, Patient Position: Chair, Cuff Size: Adult Regular)  Pulse 80  Temp 98.8  F (37.1  C) (Tympanic)  Wt 124 lb (56.2 kg)  LMP 02/01/2017  BMI 20.26 kg/m2    GENERAL APPEARANCE: healthy, alert and no distress     EYES: EOMI, PERRL     HENT: ear canals and TM's normal and nose and mouth without ulcers or lesions     NECK: no adenopathy, no asymmetry, masses, or scars and thyroid normal to palpation     RESP: lungs clear to auscultation - no rales, rhonchi or wheezes     CV: regular rates and rhythm, normal S1 S2, no S3 or S4 and no murmur, click or rub     ABDOMEN: Soft, nondistended. Bowel sounds present throughout. No masses, no organomegaly. Diffuse tenderness across the pelvic area     MS: extremities normal- no gross deformities noted, no evidence of inflammation in joints, FROM in all extremities.     SKIN: no suspicious lesions or rashes     NEURO: Normal strength and tone, sensory exam grossly normal, mentation intact and speech normal     PSYCH:  mentation appears normal. and affect normal/bright     LYMPHATICS: No axillary, cervical, or supraclavicular nodes    DIAGNOSTICS:                                                    Urine hCG has been ordered  Results for orders placed or performed in visit on 03/01/17 (from the past 24 hour(s))   CBC with platelets   Result Value Ref Range    WBC 4.5 4.0 - 11.0 10e9/L    RBC Count 4.72 3.8 - 5.2 10e12/L    Hemoglobin 13.7 11.7 - 15.7 g/dL    Hematocrit 41.2 35.0 - 47.0 %    MCV 87 78 - 100 fl    MCH 29.0 26.5 - 33.0 pg    MCHC 33.3 31.5 - 36.5 g/dL    RDW 12.9 10.0 - 15.0 %    Platelet Count 274 150 - 450 10e9/L           IMPRESSION:                                                    Reason for surgery/procedure: laparoscopy, hysteroscopy, dilation and curettage and insertion of mirena intrauterine device  Diagnosis/reason for consult: No medical contraindication noted to proceeding as scheduled     The proposed surgical procedure is considered INTERMEDIATE risk.    REVISED CARDIAC RISK INDEX  The patient has the following serious cardiovascular risks for perioperative complications such as (MI, PE, VFib and 3  AV Block):  No serious cardiac risks  INTERPRETATION: 0 risks: Class I (very low risk - 0.4% complication rate)    The patient has the following additional risks for perioperative complications:  No identified additional risks      ICD-10-CM    1. Preop general physical exam Z01.818 CBC with platelets   2. Endometriosis N80.9    3. Acute bronchitis, unspecified organism J20.9 azithromycin (ZITHROMAX) 250 MG tablet       RECOMMENDATIONS:                                                          APPROVAL GIVEN to proceed with proposed procedure, without further diagnostic evaluation       Signed Electronically by: CHRISSY Smith CNP    Copy of this evaluation report is provided to requesting physician.    [place] Preop Guidelines

## 2017-03-03 NOTE — TELEPHONE ENCOUNTER
Per provider the patients surgery time has changed from 8am to 10am.  The patient new arrive time will be 8:30am.  Called and left message for patient to call clinic.  Inder Hector MA

## 2017-03-06 ENCOUNTER — ANESTHESIA (OUTPATIENT)
Dept: SURGERY | Facility: CLINIC | Age: 32
End: 2017-03-06
Payer: COMMERCIAL

## 2017-03-06 ENCOUNTER — ANESTHESIA EVENT (OUTPATIENT)
Dept: SURGERY | Facility: CLINIC | Age: 32
End: 2017-03-06
Payer: COMMERCIAL

## 2017-03-06 ENCOUNTER — HOSPITAL ENCOUNTER (OUTPATIENT)
Facility: CLINIC | Age: 32
Discharge: HOME | End: 2017-03-06
Attending: OBSTETRICS & GYNECOLOGY | Admitting: OBSTETRICS & GYNECOLOGY
Payer: COMMERCIAL

## 2017-03-06 VITALS
DIASTOLIC BLOOD PRESSURE: 51 MMHG | OXYGEN SATURATION: 100 % | TEMPERATURE: 97.4 F | SYSTOLIC BLOOD PRESSURE: 119 MMHG | RESPIRATION RATE: 18 BRPM

## 2017-03-06 DIAGNOSIS — N80.9 ENDOMETRIOSIS: ICD-10-CM

## 2017-03-06 DIAGNOSIS — Z98.890 S/P LAPAROSCOPIC PROCEDURE: Primary | ICD-10-CM

## 2017-03-06 LAB — B-HCG SERPL-ACNC: <1 IU/L

## 2017-03-06 PROCEDURE — 84702 CHORIONIC GONADOTROPIN TEST: CPT | Performed by: OBSTETRICS & GYNECOLOGY

## 2017-03-06 PROCEDURE — 88305 TISSUE EXAM BY PATHOLOGIST: CPT | Performed by: OBSTETRICS & GYNECOLOGY

## 2017-03-06 PROCEDURE — 37000008 ZZH ANESTHESIA TECHNICAL FEE, 1ST 30 MIN: Performed by: OBSTETRICS & GYNECOLOGY

## 2017-03-06 PROCEDURE — 25800025 ZZH RX 258: Performed by: NURSE ANESTHETIST, CERTIFIED REGISTERED

## 2017-03-06 PROCEDURE — 25000125 ZZHC RX 250: Performed by: NURSE ANESTHETIST, CERTIFIED REGISTERED

## 2017-03-06 PROCEDURE — 25000564 ZZH DESFLURANE, EA 15 MIN: Performed by: OBSTETRICS & GYNECOLOGY

## 2017-03-06 PROCEDURE — 36415 COLL VENOUS BLD VENIPUNCTURE: CPT | Performed by: OBSTETRICS & GYNECOLOGY

## 2017-03-06 PROCEDURE — 36000058 ZZH SURGERY LEVEL 3 EA 15 ADDTL MIN: Performed by: OBSTETRICS & GYNECOLOGY

## 2017-03-06 PROCEDURE — 36000056 ZZH SURGERY LEVEL 3 1ST 30 MIN: Performed by: OBSTETRICS & GYNECOLOGY

## 2017-03-06 PROCEDURE — 27110028 ZZH OR GENERAL SUPPLY NON-STERILE: Performed by: OBSTETRICS & GYNECOLOGY

## 2017-03-06 PROCEDURE — 37000009 ZZH ANESTHESIA TECHNICAL FEE, EACH ADDTL 15 MIN: Performed by: OBSTETRICS & GYNECOLOGY

## 2017-03-06 PROCEDURE — 25000132 ZZH RX MED GY IP 250 OP 250 PS 637: Performed by: OBSTETRICS & GYNECOLOGY

## 2017-03-06 PROCEDURE — 27210794 ZZH OR GENERAL SUPPLY STERILE: Performed by: OBSTETRICS & GYNECOLOGY

## 2017-03-06 PROCEDURE — 88305 TISSUE EXAM BY PATHOLOGIST: CPT | Mod: 26 | Performed by: OBSTETRICS & GYNECOLOGY

## 2017-03-06 PROCEDURE — 25000125 ZZHC RX 250: Performed by: OBSTETRICS & GYNECOLOGY

## 2017-03-06 PROCEDURE — 25000128 H RX IP 250 OP 636: Performed by: NURSE ANESTHETIST, CERTIFIED REGISTERED

## 2017-03-06 PROCEDURE — 71000014 ZZH RECOVERY PHASE 1 LEVEL 2 FIRST HR: Performed by: OBSTETRICS & GYNECOLOGY

## 2017-03-06 PROCEDURE — 40000306 ZZH STATISTIC PRE PROC ASSESS II: Performed by: OBSTETRICS & GYNECOLOGY

## 2017-03-06 PROCEDURE — 71000027 ZZH RECOVERY PHASE 2 EACH 15 MINS: Performed by: OBSTETRICS & GYNECOLOGY

## 2017-03-06 PROCEDURE — 25000128 H RX IP 250 OP 636: Performed by: OBSTETRICS & GYNECOLOGY

## 2017-03-06 DEVICE — IUD CONTRACEPTIVE DEVICE MIRENA 50419-4230-01: Type: IMPLANTABLE DEVICE | Site: UTERUS | Status: FUNCTIONAL

## 2017-03-06 RX ORDER — FENTANYL CITRATE 50 UG/ML
25-50 INJECTION, SOLUTION INTRAMUSCULAR; INTRAVENOUS
Status: DISCONTINUED | OUTPATIENT
Start: 2017-03-06 | End: 2017-03-06 | Stop reason: HOSPADM

## 2017-03-06 RX ORDER — CEFAZOLIN SODIUM 1 G/3ML
1 INJECTION, POWDER, FOR SOLUTION INTRAMUSCULAR; INTRAVENOUS SEE ADMIN INSTRUCTIONS
Status: DISCONTINUED | OUTPATIENT
Start: 2017-03-06 | End: 2017-03-06 | Stop reason: HOSPADM

## 2017-03-06 RX ORDER — ALBUTEROL SULFATE 0.83 MG/ML
2.5 SOLUTION RESPIRATORY (INHALATION) EVERY 4 HOURS PRN
Status: DISCONTINUED | OUTPATIENT
Start: 2017-03-06 | End: 2017-03-06 | Stop reason: HOSPADM

## 2017-03-06 RX ORDER — ONDANSETRON 2 MG/ML
INJECTION INTRAMUSCULAR; INTRAVENOUS PRN
Status: DISCONTINUED | OUTPATIENT
Start: 2017-03-06 | End: 2017-03-06

## 2017-03-06 RX ORDER — METOCLOPRAMIDE 5 MG/1
10 TABLET ORAL EVERY 6 HOURS PRN
Status: DISCONTINUED | OUTPATIENT
Start: 2017-03-06 | End: 2017-03-06 | Stop reason: HOSPADM

## 2017-03-06 RX ORDER — OXYCODONE AND ACETAMINOPHEN 5; 325 MG/1; MG/1
1-2 TABLET ORAL EVERY 6 HOURS PRN
Qty: 40 TABLET | Refills: 0 | Status: SHIPPED | OUTPATIENT
Start: 2017-03-06 | End: 2017-11-13

## 2017-03-06 RX ORDER — DEXAMETHASONE SODIUM PHOSPHATE 10 MG/ML
INJECTION, SOLUTION INTRAMUSCULAR; INTRAVENOUS PRN
Status: DISCONTINUED | OUTPATIENT
Start: 2017-03-06 | End: 2017-03-06

## 2017-03-06 RX ORDER — BUPIVACAINE HYDROCHLORIDE AND EPINEPHRINE 2.5; 5 MG/ML; UG/ML
INJECTION, SOLUTION INFILTRATION; PERINEURAL PRN
Status: DISCONTINUED | OUTPATIENT
Start: 2017-03-06 | End: 2017-03-06 | Stop reason: HOSPADM

## 2017-03-06 RX ORDER — OXYCODONE AND ACETAMINOPHEN 5; 325 MG/1; MG/1
1-2 TABLET ORAL ONCE
Status: COMPLETED | OUTPATIENT
Start: 2017-03-06 | End: 2017-03-06

## 2017-03-06 RX ORDER — AMOXICILLIN 250 MG
1-2 CAPSULE ORAL DAILY PRN
Qty: 30 TABLET | Refills: 3 | Status: SHIPPED | OUTPATIENT
Start: 2017-03-06 | End: 2017-11-13

## 2017-03-06 RX ORDER — LIDOCAINE 40 MG/G
CREAM TOPICAL
Status: DISCONTINUED | OUTPATIENT
Start: 2017-03-06 | End: 2017-03-06 | Stop reason: HOSPADM

## 2017-03-06 RX ORDER — HYDRALAZINE HYDROCHLORIDE 20 MG/ML
2.5-5 INJECTION INTRAMUSCULAR; INTRAVENOUS EVERY 10 MIN PRN
Status: DISCONTINUED | OUTPATIENT
Start: 2017-03-06 | End: 2017-03-06 | Stop reason: HOSPADM

## 2017-03-06 RX ORDER — FENTANYL CITRATE 50 UG/ML
INJECTION, SOLUTION INTRAMUSCULAR; INTRAVENOUS PRN
Status: DISCONTINUED | OUTPATIENT
Start: 2017-03-06 | End: 2017-03-06

## 2017-03-06 RX ORDER — ONDANSETRON 2 MG/ML
4 INJECTION INTRAMUSCULAR; INTRAVENOUS EVERY 30 MIN PRN
Status: DISCONTINUED | OUTPATIENT
Start: 2017-03-06 | End: 2017-03-06 | Stop reason: HOSPADM

## 2017-03-06 RX ORDER — ONDANSETRON 4 MG/1
4 TABLET, ORALLY DISINTEGRATING ORAL EVERY 30 MIN PRN
Status: DISCONTINUED | OUTPATIENT
Start: 2017-03-06 | End: 2017-03-06 | Stop reason: HOSPADM

## 2017-03-06 RX ORDER — MEPERIDINE HYDROCHLORIDE 50 MG/ML
12.5 INJECTION INTRAMUSCULAR; INTRAVENOUS; SUBCUTANEOUS
Status: DISCONTINUED | OUTPATIENT
Start: 2017-03-06 | End: 2017-03-06 | Stop reason: HOSPADM

## 2017-03-06 RX ORDER — EPHEDRINE SULFATE 50 MG/ML
INJECTION, SOLUTION INTRAMUSCULAR; INTRAVENOUS; SUBCUTANEOUS PRN
Status: DISCONTINUED | OUTPATIENT
Start: 2017-03-06 | End: 2017-03-06

## 2017-03-06 RX ORDER — NALOXONE HYDROCHLORIDE 0.4 MG/ML
.1-.4 INJECTION, SOLUTION INTRAMUSCULAR; INTRAVENOUS; SUBCUTANEOUS
Status: DISCONTINUED | OUTPATIENT
Start: 2017-03-06 | End: 2017-03-06 | Stop reason: HOSPADM

## 2017-03-06 RX ORDER — LABETALOL HYDROCHLORIDE 5 MG/ML
10 INJECTION, SOLUTION INTRAVENOUS
Status: DISCONTINUED | OUTPATIENT
Start: 2017-03-06 | End: 2017-03-06 | Stop reason: HOSPADM

## 2017-03-06 RX ORDER — DIMENHYDRINATE 50 MG/ML
25 INJECTION, SOLUTION INTRAMUSCULAR; INTRAVENOUS
Status: DISCONTINUED | OUTPATIENT
Start: 2017-03-06 | End: 2017-03-06 | Stop reason: HOSPADM

## 2017-03-06 RX ORDER — PROPOFOL 10 MG/ML
INJECTION, EMULSION INTRAVENOUS CONTINUOUS PRN
Status: DISCONTINUED | OUTPATIENT
Start: 2017-03-06 | End: 2017-03-06

## 2017-03-06 RX ORDER — METOCLOPRAMIDE HYDROCHLORIDE 5 MG/ML
10 INJECTION INTRAMUSCULAR; INTRAVENOUS EVERY 6 HOURS PRN
Status: DISCONTINUED | OUTPATIENT
Start: 2017-03-06 | End: 2017-03-06 | Stop reason: HOSPADM

## 2017-03-06 RX ORDER — IBUPROFEN 600 MG/1
600 TABLET, FILM COATED ORAL EVERY 6 HOURS PRN
Qty: 60 TABLET | Refills: 1 | Status: SHIPPED | OUTPATIENT
Start: 2017-03-06 | End: 2017-11-13

## 2017-03-06 RX ORDER — SODIUM CHLORIDE, SODIUM LACTATE, POTASSIUM CHLORIDE, CALCIUM CHLORIDE 600; 310; 30; 20 MG/100ML; MG/100ML; MG/100ML; MG/100ML
INJECTION, SOLUTION INTRAVENOUS CONTINUOUS
Status: DISCONTINUED | OUTPATIENT
Start: 2017-03-06 | End: 2017-03-06 | Stop reason: HOSPADM

## 2017-03-06 RX ORDER — LIDOCAINE HYDROCHLORIDE 20 MG/ML
INJECTION, SOLUTION INFILTRATION; PERINEURAL PRN
Status: DISCONTINUED | OUTPATIENT
Start: 2017-03-06 | End: 2017-03-06

## 2017-03-06 RX ORDER — CEFAZOLIN SODIUM 2 G/100ML
2 INJECTION, SOLUTION INTRAVENOUS
Status: COMPLETED | OUTPATIENT
Start: 2017-03-06 | End: 2017-03-06

## 2017-03-06 RX ORDER — PROPOFOL 10 MG/ML
INJECTION, EMULSION INTRAVENOUS PRN
Status: DISCONTINUED | OUTPATIENT
Start: 2017-03-06 | End: 2017-03-06

## 2017-03-06 RX ADMIN — PROPOFOL 20 MG: 10 INJECTION, EMULSION INTRAVENOUS at 10:35

## 2017-03-06 RX ADMIN — Medication 5 MG: at 10:51

## 2017-03-06 RX ADMIN — Medication 10 MG: at 11:20

## 2017-03-06 RX ADMIN — PROPOFOL 20 MG: 10 INJECTION, EMULSION INTRAVENOUS at 10:32

## 2017-03-06 RX ADMIN — FENTANYL CITRATE 50 MCG: 50 INJECTION, SOLUTION INTRAMUSCULAR; INTRAVENOUS at 11:39

## 2017-03-06 RX ADMIN — SUCCINYLCHOLINE CHLORIDE 60 MG: 20 INJECTION, SOLUTION INTRAMUSCULAR; INTRAVENOUS at 10:36

## 2017-03-06 RX ADMIN — PHENYLEPHRINE HYDROCHLORIDE 100 MCG: 10 INJECTION, SOLUTION INTRAMUSCULAR; INTRAVENOUS; SUBCUTANEOUS at 10:41

## 2017-03-06 RX ADMIN — FENTANYL CITRATE 50 MCG: 50 INJECTION, SOLUTION INTRAMUSCULAR; INTRAVENOUS at 10:36

## 2017-03-06 RX ADMIN — DEXAMETHASONE SODIUM PHOSPHATE 10 MG: 10 INJECTION, SOLUTION INTRAMUSCULAR; INTRAVENOUS at 10:41

## 2017-03-06 RX ADMIN — SODIUM CHLORIDE, POTASSIUM CHLORIDE, SODIUM LACTATE AND CALCIUM CHLORIDE: 600; 310; 30; 20 INJECTION, SOLUTION INTRAVENOUS at 11:02

## 2017-03-06 RX ADMIN — MIDAZOLAM HYDROCHLORIDE 2 MG: 1 INJECTION, SOLUTION INTRAMUSCULAR; INTRAVENOUS at 10:28

## 2017-03-06 RX ADMIN — FENTANYL CITRATE 50 MCG: 50 INJECTION, SOLUTION INTRAMUSCULAR; INTRAVENOUS at 11:34

## 2017-03-06 RX ADMIN — LIDOCAINE HYDROCHLORIDE 0.1 ML: 10 INJECTION, SOLUTION EPIDURAL; INFILTRATION; INTRACAUDAL; PERINEURAL at 09:31

## 2017-03-06 RX ADMIN — FENTANYL CITRATE 50 MCG: 50 INJECTION, SOLUTION INTRAMUSCULAR; INTRAVENOUS at 12:01

## 2017-03-06 RX ADMIN — HYDROMORPHONE HYDROCHLORIDE 0.5 MG: 1 INJECTION, SOLUTION INTRAMUSCULAR; INTRAVENOUS; SUBCUTANEOUS at 11:57

## 2017-03-06 RX ADMIN — PROPOFOL 80 MG: 10 INJECTION, EMULSION INTRAVENOUS at 10:36

## 2017-03-06 RX ADMIN — ONDANSETRON 4 MG: 2 INJECTION INTRAMUSCULAR; INTRAVENOUS at 10:41

## 2017-03-06 RX ADMIN — FENTANYL CITRATE 50 MCG: 50 INJECTION, SOLUTION INTRAMUSCULAR; INTRAVENOUS at 10:52

## 2017-03-06 RX ADMIN — SODIUM CHLORIDE, POTASSIUM CHLORIDE, SODIUM LACTATE AND CALCIUM CHLORIDE: 600; 310; 30; 20 INJECTION, SOLUTION INTRAVENOUS at 11:41

## 2017-03-06 RX ADMIN — PROPOFOL 30 MG: 10 INJECTION, EMULSION INTRAVENOUS at 11:04

## 2017-03-06 RX ADMIN — SODIUM CHLORIDE, POTASSIUM CHLORIDE, SODIUM LACTATE AND CALCIUM CHLORIDE: 600; 310; 30; 20 INJECTION, SOLUTION INTRAVENOUS at 09:31

## 2017-03-06 RX ADMIN — Medication 5 MG: at 10:48

## 2017-03-06 RX ADMIN — HYDROMORPHONE HYDROCHLORIDE 0.5 MG: 1 INJECTION, SOLUTION INTRAMUSCULAR; INTRAVENOUS; SUBCUTANEOUS at 11:42

## 2017-03-06 RX ADMIN — FENTANYL CITRATE 50 MCG: 50 INJECTION, SOLUTION INTRAMUSCULAR; INTRAVENOUS at 11:50

## 2017-03-06 RX ADMIN — CEFAZOLIN SODIUM 1 G: 2 INJECTION, SOLUTION INTRAVENOUS at 10:47

## 2017-03-06 RX ADMIN — OXYCODONE HYDROCHLORIDE AND ACETAMINOPHEN 2 TABLET: 5; 325 TABLET ORAL at 12:08

## 2017-03-06 RX ADMIN — PROPOFOL 100 MCG/KG/MIN: 10 INJECTION, EMULSION INTRAVENOUS at 10:32

## 2017-03-06 RX ADMIN — LIDOCAINE HYDROCHLORIDE 40 MG: 20 INJECTION, SOLUTION INFILTRATION; PERINEURAL at 10:32

## 2017-03-06 NOTE — BRIEF OP NOTE
Brief Operative Note:  Preoperative Diagnosis:pelvic pain, history of endometriosis  Postoperative Diagnosis: endometriosis, pelvic adhesions  Procedure: 1. Laparoscopic lysis of adhesions  2. Laparoscopic fulgeration of endometriosis  3. Hysteroscopy Dilatation and curettage  4. Insertion of Mirena IUD  Anesthesia:general    Surgeon: Ranjith King MD  Assistant: Perla Maria MD  (The assistance of this surgeon was required due to the need for additional skilled surgical hands during the case.)    Findings: see dictation    Estimated blood loss:  10cc  Fluids: 1500 cc crystalloid  Complications: none  See complete operative note-dictated separately.  SRUTHI King MD

## 2017-03-06 NOTE — IP AVS SNAPSHOT
MRN:[id number]                      After Visit Summary   3/6/2017    Debbie Gage    MRN: [id number]           Thank you!     Thank you for choosing [facility] for your care. Our goal is always to provide you with excellent care. Hearing back from our patients is one way we can continue to improve our services. Please take a few minutes to complete the written survey that you may receive in the mail after you visit with us. Thank you!        Patient Information     Date Of Birth          1985        About your hospital stay     You were admitted on:  March 6, 2017 You last received care in the:  [facility]    You were discharged on:  March 6, 2017       Who to Call     For medical emergencies, please call 911.  For non-urgent questions about your medical care, please call your primary care provider or clinic, [phone]  For questions related to your surgery, please call your surgery clinic        Attending Provider     Provider Specialty    Ranjith King MD [facility]       Primary Care Provider Office Phone # Fax #    Shelli CHRISSY Barfield [facility] [phone]       [address] [address]        Your next 10 appointments already scheduled     Mar 09, 2017 10:10 AM CST   SHORT with Ranjith King MD   [facility] ([address] [id number] [phone]            Mar 14, 2017  8:00 AM CDT   LAB with [facility] [address] [address] [phone]           Patient must bring picture ID.  Patient should be prepared to give a urine specimen  Please do not eat 10-12 hours before your appointment if you are coming in fasting for labs on lipids, cholesterol, or glucose (sugar).  Pregnant women should follow their Care Team instructions.  Water with medications is okay. Do not drink coffee or other fluids.   If you have concerns about taking  your medications, please ask at office or if scheduling via [employer], send a message by clicking on Secure Messaging, Message Your Care Team.            Mar 14, 2017  9:10 AM CDT   LAB with Ranjith King MD   [facility]    [address] [facility] [id number] [phone]           Patient must bring picture ID.  Patient should be prepared to give a urine specimen  Please do not eat 10-12 hours before your appointment if you are coming in fasting for labs on lipids, cholesterol, or glucose (sugar).  Pregnant women should follow their Care Team instructions. Water with medications is okay. Do not drink coffee or other fluids.   If you have concerns about taking  your medications, please ask at office or if scheduling via [employer], send a message by clicking on Secure Messaging, Message Your Care Team.              Further instructions from your care team       Discharge instructions for Dr. King after laparoscopic surgery:         You will need to schedule a post operative appointment within the next week.. Please call [phone] to speak to Dr King's nurse  or else call the main appointments line at [phone] if she is not available.         If you have unusually heavy vaginal bleeding, severe nausea with vomiting, or increased pain, or fever,or if your incisions are bleeding or appear infected, call us at that same number to be seen earlier than your postoperative appoinment, or go to the emergency room if after hours or we are unavailable.          You should avoid intercourse for 1 week after surgery.           You should not drive for 1 day after surgery.  When you resume driving, make sure that you are able to apply your foot to the brake rapidly so that you can stop the car suddenly if necessary.  Dont drive until you are able  to do this.           You should limit lifting to 20 pounds for 2 weeks after surgery.           You should not shower today but may resume showering tomorrow after the effects of anesthesia wear off.  Do not take a bath for 1 week after surgery.           If you have questions do not hesitate to call the office at above number. Thank you.         Ranjith King MD, FACOG, FAAFP              , OB/GYN  Department.      [facility]  Same-Day Surgery  Adult Discharge Orders & Instructions    For 24 hours after surgery    1. Get plenty of rest.  A responsible adult must stay with you for at least 24 hours after you leave the hospital.   2. Do not drive or use heavy equipment.  If you have weakness or tingling, don't drive or use heavy equipment until this feeling goes away.  3. Do not drink alcohol.  4. Avoid strenuous or risky activities.  Ask for help when climbing stairs.   5. You may feel lightheaded.  IF so, sit for a few minutes before standing.  Have someone help you get up.   6. If you have nausea (feel sick to your stomach): Drink only clear liquids such as apple juice, ginger ale, broth or 7-Up.  Rest may also help.  Be sure to drink enough fluids.  Move to a regular diet as you feel able.  7. You may have a slight fever. Call the doctor if your fever is over 100 F (37.7 C) (taken under the tongue) or lasts longer than 24 hours.  8. You may have a dry mouth, a sore throat, muscle aches or trouble sleeping.  These should go away after 24 hours.  9. Do not make important or legal decisions.   Call your doctor for any of the followin.  Signs of infection (fever, growing tenderness at the surgery site, a large amount of drainage or bleeding, severe pain, foul-smelling drainage, redness, swelling).    2. It has been over 8 to 10 hours since surgery and you are still not able to urinate (pass water).    3.  Headache for over 24 hours.              Pending Results     Date and  "Time Order Name Status Description    3/6/2017 1113 Surgical pathology exam In process             Admission Information     Date & Time Provider Department Dept. Phone    3/6/2017 Ranjith King MD [facility] [phone]      Your Vitals Were     Blood Pressure Temperature Respirations Last Period Pulse Oximetry       104/62 97.4  F (36.3  C) (Temporal) 18 2017 99%       MyChart Information     [employer] lets you send messages to your doctor, view your test results, renew your prescriptions, schedule appointments and more. To sign up, go to [email or web address] . Click on \"Log in\" on the left side of the screen, which will take you to the Welcome page. Then click on \"Sign up Now\" on the right side of the page.     You will be asked to enter the access code listed below, as well as some personal information. Please follow the directions to create your username and password.     Your access code is: [email or web address]  Expires: 2017  9:48 AM     Your access code will  in 90 days. If you need help or a new code, please call your [place] clinic or [phone].        Care EveryWhere ID     This is your Care EveryWhere ID. This could be used by other organizations to access your [place] medical records  [id number]           Review of your medicines      START taking        Dose / Directions    ibuprofen 600 MG tablet   Commonly known as:  ADVIL/MOTRIN   Used for:  S/P laparoscopic procedure        Dose:  600 mg   Take 1 tablet (600 mg) by mouth every 6 hours as needed for moderate pain   Quantity:  60 tablet   Refills:  1       oxyCODONE-acetaminophen 5-325 MG per tablet   Commonly known as:  PERCOCET   Used for:  S/P laparoscopic procedure        Dose:  1-2 tablet   Take 1-2 tablets by mouth every 6 hours as needed for moderate to severe pain   Quantity:  40 tablet   Refills:  0       senna-docusate 8.6-50 MG per tablet   Commonly known as:  SENOKOT-S;PERICOLACE "   Used for:  S/P laparoscopic procedure        Dose:  1-2 tablet   Take 1-2 tablets by mouth daily as needed for constipation   Quantity:  30 tablet   Refills:  3            Where to get your medicines      These medications were sent to [facility] [place] - [address]  [address], [place] [address]     Phone:  [phone]     ibuprofen 600 MG tablet    senna-docusate 8.6-50 MG per tablet         Some of these will need a paper prescription and others can be bought over the counter. Ask your nurse if you have questions.     Bring a paper prescription for each of these medications     oxyCODONE-acetaminophen 5-325 MG per tablet                Protect others around you: Learn how to safely use, store and throw away your medicines at www.disposemymeds.org.             Medication List: This is a list of all your medications and when to take them. Check marks below indicate your daily home schedule. Keep this list as a reference.      Medications           Morning Afternoon Evening Bedtime As Needed    ibuprofen 600 MG tablet   Commonly known as:  ADVIL/MOTRIN   Take 1 tablet (600 mg) by mouth every 6 hours as needed for moderate pain                                oxyCODONE-acetaminophen 5-325 MG per tablet   Commonly known as:  PERCOCET   Take 1-2 tablets by mouth every 6 hours as needed for moderate to severe pain   Last time this was given:  2 tablets on 3/6/2017 12:08 PM   Next Dose Due:  6 PM                    6 PM               senna-docusate 8.6-50 MG per tablet   Commonly known as:  SENOKOT-S;PERICOLACE   Take 1-2 tablets by mouth daily as needed for constipation

## 2017-03-06 NOTE — ANESTHESIA PREPROCEDURE EVALUATION
Anesthesia Evaluation     . Pt has had prior anesthetic. Type: General and Regional      ROS/MED HX    ENT/Pulmonary:  - neg pulmonary ROS     Neurologic:  - neg neurologic ROS     Cardiovascular:  - neg cardiovascular ROS       METS/Exercise Tolerance:  >4 METS   Hematologic:  - neg hematologic  ROS       Musculoskeletal:  - neg musculoskeletal ROS       GI/Hepatic:  - neg GI/hepatic ROS       Renal/Genitourinary:  - ROS Renal section negative       Endo:  - neg endo ROS       Psychiatric:  - neg psychiatric ROS       Infectious Disease:  - neg infectious disease ROS       Malignancy:      - no malignancy   Other:    (+) No chance of pregnancy C-spine cleared: N/A, no H/O Chronic Pain,no other significant disability   - neg other ROS           Physical Exam  Normal systems: cardiovascular, pulmonary and dental    Airway   Mallampati: I  TM distance: >3 FB  Neck ROM: full    Dental     Cardiovascular   Rhythm and rate: regular and normal      Pulmonary    breath sounds clear to auscultation                    Anesthesia Plan      History & Physical Review  History and physical reviewed and following examination; no interval change.H + P from 3/1/17 reviewed, pt. cleared for surgery.     ASA Status:  1 .    NPO Status:  > 8 hours    Plan for General and ETT with Intravenous and Propofol induction. Maintenance will be Inhalation.    PONV prophylaxis:  Ondansetron (or other 5HT-3), Meclizine or Dimenhydrinate and Dexamethasone or Solumedrol  GETA planned for laparoscopy      Postoperative Care  Postoperative pain management:  IV analgesics and Oral pain medications.      Consents  Anesthetic plan, risks, benefits and alternatives discussed with:  Patient and Spouse.  Use of blood products discussed: Yes.   Use of blood products discussed with Patient and Spouse.  Consented to blood products.  .                          .

## 2017-03-06 NOTE — OP NOTE
DATE OF PROCEDURE:  3/6/2017      PREOPERATIVE DIAGNOSES:   1.  Pelvic pain.   2.  History of endometriosis.      POSTOPERATIVE DIAGNOSES:  Endometriosis and pelvic adhesions.      PROCEDURES:   1.  Laparoscopic lysis of adhesions.   2.  Laparoscopic fulguration of endometriosis.   3.  Hysteroscopy, dilatation and curettage.   4.  Insertion of Mirena IUD.      ANESTHESIA:  General endotracheal anesthesia.      SURGEON:  Ranjith King MD      ASSISTANT:  Perla Maria MD (the assistance of this surgeon was required due to the need for additional skilled surgical hands).      INDICATIONS:   Ms. Debbie Gage is a 31-year-old female with previous laparoscopic evidence of endometriosis who is currently having right-sided pelvic pain and suspicion of recurrent endometriosis.      OPERATIVE FINDINGS:  Indeed, she did have endometriosis implants on the left and right side.  Also on the right side, there were some adhesions of the right adnexa to the right posterior cul-de-sac.  This is in the area of her pain.  There were some endometriosis implants in that same area.  Therefore, we elected to lyse those adhesions with the LigaSure device and also cauterize the endometriosis implants since they were well away from the ureter or other vital organs on that side.  The endometriosis appeared to be implanted in the adhesion and so we cauterized it as we lysed the adhesions.  There was also some endometriosis seen on the left ovary and this was also cauterized.  Both fallopian tubes looked entirely normal.  Both ovaries looked normal other than the small endometriosis implant on the left one.  The posterior cul-de-sac appeared mostly clean except for the adhesions on the right side.  Anteriorly there were adhesions of her bladder to the lower uterine segment from her 2 previous C-sections.  The appendix appeared to be surgically absent.  There were some staple marks up on that part of the cecum.  Other than that, we  saw no significant findings in the pelvis or abdomen.  Also, the hysteroscopy revealed a normal endometrial cavity.  Endometrial curettings were sent for pathology and then the intrauterine device was inserted.  See operative description for other details.      OPERATIVE DESCRIPTION:  After the establishment of satisfactory general endotracheal anesthesia, the patient was prepped and draped in the usual fashion for laparoscopy and vaginal surgery and the bladder was drained of urine.  A 5 mm incision was made just in the lower part of the umbilicus and I inserted the 5 mm Visiport with laparoscope attached to verify correct placement and inserted that in the peritoneal cavity and then insufflated the abdomen with CO2 gas.  I then inserted the laparoscope and verified correct placement.  I then inserted two 5 mm trocars on the left and right side.  Dr. Maria inserted her trocar on the right and I did mine on the left under direct visualization.  Dr. Maria's assistance was necessary due to the need for additional skilled surgical hands during the surgery.       Once we had the ports in we identified the issues in the pelvis including the adhesions along the right side.  Dr. Maria employed the LigaSure device across the adhesions as I steadied the tissues and then I also employed the LigaSure device; she held tissues for me.  We freed up the adhesions and also we cauterized endometriosis implants on the right lateral pelvis but not anywhere near the vital organ such as the ureters or the ovary.  Also stayed away from the major vessels on the right leg.  Also cauterized a small implant of endometriosis on the left ovary.  The uterus felt somewhat boggy and I think she may have some adenomyosis.  I did not see any fibroids.  I did note some adhesions of her bladder to the lower uterine segment, presumably from her 2 previous  sections.  At that point, we expressed the CO2 gas from the abdomen and removed the  david and Dr. Maria repaired the skin incisions with 4-0 Vicryl subcuticular stitch and each of the incisions was injected with 0.25% Marcaine with dilute epinephrine, a total of 10 cc was used.        I then inserted an open-sided speculum in the vagina and grasped the cervix anteriorly with an Allis clamp and then dilated the cervix with Hanks dilators and performed hysteroscopy using 100 cc of normal saline and 80 cc was recovered and accounted for.  I then performed curettage and sent curettings for pathology and then inserted the Mirena IUD in the usual fashion.  I then removed the Allis clamp and removed the speculum.      The patient was taken to the recovery room in good condition.  The final sponge, needle and instrument counts were reported to me as correct.  The estimated blood loss was 10 cc.  She received 1500 cc of crystalloid during the case.  I will call her tomorrow to check on her progress.         LATONIA BASHIR MD             D: 2017 11:30   T: 2017 12:17   MT: OLEGARIO#[id number]      Name:     HEATHER VALENZUELA   MRN:      [id number]        Account:        [id number]   :      1985           Procedure Date: 2017      Document: [id number]

## 2017-03-06 NOTE — ANESTHESIA POSTPROCEDURE EVALUATION
Patient: Debbie Gage    Procedure(s):  laparoscopy with lysis of adhesions, fulgeration of endometriosis, hysteroscopy, dilation and curettage and insertion of mirena intrauterine device - Wound Class: II-Clean Contaminated   - Wound Class: I-Clean   - Wound Class: I-Clean    Diagnosis:endometriosis  Diagnosis Additional Information: No value filed.    Anesthesia Type:  General, ETT    Note:  Anesthesia Post Evaluation    Patient location during evaluation: Phase 2  Patient participation: Able to fully participate in evaluation  Level of consciousness: awake and alert  Pain management: adequate  Airway patency: patent  Cardiovascular status: acceptable  Respiratory status: acceptable, spontaneous ventilation and room air  Hydration status: acceptable  PONV: none             Last vitals:  Vitals:    03/06/17 1230 03/06/17 1245 03/06/17 1246   BP: 112/53 119/51    Resp:      Temp:      SpO2: 96% 96% 100%         Electronically Signed By: CHRISSY Bennett CRNA  March 6, 2017  1:12 PM

## 2017-03-06 NOTE — IP AVS SNAPSHOT
[facility]    [address] [place] [address]    Phone:  [phone]                                       After Visit Summary   3/6/2017    Debbie Gage    MRN: [id number]           After Visit Summary Signature Page     I have received my discharge instructions, and my questions have been answered. I have discussed any challenges I see with this plan with the nurse or doctor.    ..........................................................................................................................................  Patient/Patient Representative Signature      ..........................................................................................................................................  Patient Representative Print Name and Relationship to Patient    ..................................................               ................................................  Date                                            Time    ..........................................................................................................................................  Reviewed by Signature/Title    ...................................................              ..............................................  Date                                                            Time

## 2017-03-06 NOTE — DISCHARGE INSTRUCTIONS
Discharge instructions for Dr. King after laparoscopic surgery:         You will need to schedule a post operative appointment within the next week.. Please call [phone] to speak to Dr King's nurse  or else call the main appointments line at [phone] if she is not available.         If you have unusually heavy vaginal bleeding, severe nausea with vomiting, or increased pain, or fever,or if your incisions are bleeding or appear infected, call us at that same number to be seen earlier than your postoperative appoinment, or go to the emergency room if after hours or we are unavailable.          You should avoid intercourse for 1 week after surgery.           You should not drive for 1 day after surgery.  When you resume driving, make sure that you are able to apply your foot to the brake rapidly so that you can stop the car suddenly if necessary.  Dont drive until you are able to do this.           You should limit lifting to 20 pounds for 2 weeks after surgery.           You should not shower today but may resume showering tomorrow after the effects of anesthesia wear off.  Do not take a bath for 1 week after surgery.           If you have questions do not hesitate to call the office at above number. Thank you.         Ranjith King MD, FACOG, FAAFP              , OB/GYN  Department.      [facility]  Same-Day Surgery  Adult Discharge Orders & Instructions    For 24 hours after surgery    1. Get plenty of rest.  A responsible adult must stay with you for at least 24 hours after you leave the hospital.   2. Do not drive or use heavy equipment.  If you have weakness or tingling, don't drive or use heavy equipment until this feeling goes away.  3. Do not drink alcohol.  4. Avoid strenuous or risky activities.  Ask for help when climbing stairs.   5. You may feel lightheaded.  IF so, sit for a few minutes before standing.  Have someone help you get up.   6. If you have  nausea (feel sick to your stomach): Drink only clear liquids such as apple juice, ginger ale, broth or 7-Up.  Rest may also help.  Be sure to drink enough fluids.  Move to a regular diet as you feel able.  7. You may have a slight fever. Call the doctor if your fever is over 100 F (37.7 C) (taken under the tongue) or lasts longer than 24 hours.  8. You may have a dry mouth, a sore throat, muscle aches or trouble sleeping.  These should go away after 24 hours.  9. Do not make important or legal decisions.   Call your doctor for any of the followin.  Signs of infection (fever, growing tenderness at the surgery site, a large amount of drainage or bleeding, severe pain, foul-smelling drainage, redness, swelling).    2. It has been over 8 to 10 hours since surgery and you are still not able to urinate (pass water).    3.  Headache for over 24 hours.

## 2017-03-08 LAB — COPATH REPORT: NORMAL

## 2017-03-09 ENCOUNTER — OFFICE VISIT (OUTPATIENT)
Dept: FAMILY MEDICINE | Facility: CLINIC | Age: 32
End: 2017-03-09
Payer: COMMERCIAL

## 2017-03-09 VITALS
OXYGEN SATURATION: 100 % | BODY MASS INDEX: 20.75 KG/M2 | WEIGHT: 127 LBS | TEMPERATURE: 98.8 F | RESPIRATION RATE: 16 BRPM | HEART RATE: 86 BPM | SYSTOLIC BLOOD PRESSURE: 82 MMHG | DIASTOLIC BLOOD PRESSURE: 60 MMHG

## 2017-03-09 DIAGNOSIS — Z09 POSTOPERATIVE EXAMINATION: Primary | ICD-10-CM

## 2017-03-09 PROCEDURE — 99024 POSTOP FOLLOW-UP VISIT: CPT | Performed by: OBSTETRICS & GYNECOLOGY

## 2017-03-09 ASSESSMENT — PAIN SCALES - GENERAL: PAINLEVEL: MODERATE PAIN (4)

## 2017-03-09 NOTE — NURSING NOTE
"Chief Complaint   Patient presents with     Surgical Followup       Initial BP (!) 82/60 (BP Location: Left arm, Patient Position: Chair, Cuff Size: Adult Regular)  Pulse 86  Temp 98.8  F (37.1  C) (Temporal)  Resp 16  Wt 127 lb (57.6 kg)  LMP 02/27/2017  SpO2 100%  Breastfeeding? No  BMI 20.75 kg/m2 Estimated body mass index is 20.75 kg/(m^2) as calculated from the following:    Height as of 2/8/17: 5' 5.6\" (1.666 m).    Weight as of this encounter: 127 lb (57.6 kg).  Medication Reconciliation: complete   Inder Hector MA      "

## 2017-03-09 NOTE — PROGRESS NOTES
S: The patient is here for postop check from laparoscopy. She reports normal bowel and bladder function and pain control is adequate. No fevers or other complaints.     O: VSS as shown in chart LMP 02/27/2017 Blood pressure (!) 82/60, pulse 86, temperature 98.8  F (37.1  C), temperature source Temporal, resp. rate 16, weight 127 lb (57.6 kg), last menstrual period 02/27/2017, SpO2 100 %, not currently breastfeeding.  she looks well.despite her low BP- i suspect she runs low. Also it may be the pain medication which is making her tired- her BP last monbth was 110- I realize this is a change but her pulse is fine and exam is reassuring- she is alert and seems quite stable-       Chest is clear to auscultation and cardiac exam is normal. Abdomen is soft, nondistended, and the incisions are clean, dry, and intact, and healing well. Normal bowel sounds are heard.    A: stable post op check      P: laparoscopy findings discussed with patient.  She'll follow up prn and she is advised to recheck acutely if the incisions become red or discarge noted or any sx of infection or  nausea, vomiting, or pain.       SRUTHI King MD      Addendum: i did advise her to come in sometime in the next several weeks to get a BP check.SRUTHI King MD

## 2017-03-09 NOTE — MR AVS SNAPSHOT
"              After Visit Summary   3/9/2017    Debbie Gage    MRN: [id number]           Patient Information     Date Of Birth          1985        Visit Information        Provider Department      3/9/2017 10:10 AM Ranjith King MD [facility]        Today's Diagnoses     Postoperative examination    -  1       Follow-ups after your visit        Who to contact     If you have questions or need follow up information about today's clinic visit or your schedule please contact [facility] directly at [phone].  Normal or non-critical lab and imaging results will be communicated to you by MyChart, letter or phone within 4 business days after the clinic has received the results. If you do not hear from us within 7 days, please contact the clinic through [employer] or phone. If you have a critical or abnormal lab result, we will notify you by phone as soon as possible.  Submit refill requests through [employer] or call your pharmacy and they will forward the refill request to us. Please allow 3 business days for your refill to be completed.          Additional Information About Your Visit        MyChart Information     [employer] lets you send messages to your doctor, view your test results, renew your prescriptions, schedule appointments and more. To sign up, go to [email or web address] . Click on \"Log in\" on the left side of the screen, which will take you to the Welcome page. Then click on \"Sign up Now\" on the right side of the page.     You will be asked to enter the access code listed below, as well as some personal information. Please follow the directions to create your username and password.     Your access code is: [email or web address]  Expires: 2017  9:48 AM     Your access code will  in 90 days. If you need help or a new code, please call your [facility] or [phone].        Care EveryWhere ID     This is your Care EveryWhere ID. This could be used by " other organizations to access your [place] medical records  [id number]        Your Vitals Were     Pulse Temperature Respirations Last Period Pulse Oximetry Breastfeeding?    86 98.8  F (37.1  C) (Temporal) 16 02/27/2017 100% No    BMI (Body Mass Index)                   20.75 kg/m2            Blood Pressure from Last 3 Encounters:   03/09/17 (!) 82/60   03/06/17 119/51   03/01/17 90/62    Weight from Last 3 Encounters:   03/09/17 127 lb (57.6 kg)   03/01/17 124 lb (56.2 kg)   02/22/17 124 lb (56.2 kg)              Today, you had the following     No orders found for display       Primary Care Provider Office Phone # Fax #    CHRISSY Smith [phone]       [facility] [address] [place] [address]        Thank you!     Thank you for choosing [facility]  for your care. Our goal is always to provide you with excellent care. Hearing back from our patients is one way we can continue to improve our services. Please take a few minutes to complete the written survey that you may receive in the mail after your visit with us. Thank you!             Your Updated Medication List - Protect others around you: Learn how to safely use, store and throw away your medicines at www.disposemymeds.org.          This list is accurate as of: 3/9/17 11:27 AM.  Always use your most recent med list.                   Brand Name Dispense Instructions for use    ibuprofen 600 MG tablet    ADVIL/MOTRIN    60 tablet    Take 1 tablet (600 mg) by mouth every 6 hours as needed for moderate pain       oxyCODONE-acetaminophen 5-325 MG per tablet    PERCOCET    40 tablet    Take 1-2 tablets by mouth every 6 hours as needed for moderate to severe pain       senna-docusate 8.6-50 MG per tablet    SENOKOT-S;PERICOLACE    30 tablet    Take 1-2 tablets by mouth daily as needed for constipation

## 2017-11-13 ENCOUNTER — OFFICE VISIT (OUTPATIENT)
Dept: FAMILY MEDICINE | Facility: CLINIC | Age: 32
End: 2017-11-13
Payer: COMMERCIAL

## 2017-11-13 VITALS
RESPIRATION RATE: 16 BRPM | SYSTOLIC BLOOD PRESSURE: 110 MMHG | TEMPERATURE: 98.2 F | OXYGEN SATURATION: 100 % | DIASTOLIC BLOOD PRESSURE: 68 MMHG | HEART RATE: 92 BPM | BODY MASS INDEX: 20.73 KG/M2 | WEIGHT: 129 LBS | HEIGHT: 66 IN

## 2017-11-13 DIAGNOSIS — N80.9 ENDOMETRIOSIS: Primary | ICD-10-CM

## 2017-11-13 PROCEDURE — 58301 REMOVE INTRAUTERINE DEVICE: CPT | Performed by: OBSTETRICS & GYNECOLOGY

## 2017-11-13 PROCEDURE — 99213 OFFICE O/P EST LOW 20 MIN: CPT | Mod: 25 | Performed by: OBSTETRICS & GYNECOLOGY

## 2017-11-13 ASSESSMENT — PAIN SCALES - GENERAL: PAINLEVEL: NO PAIN (0)

## 2017-11-13 NOTE — NURSING NOTE
"Chief Complaint   Patient presents with     IUD       Initial /68  Pulse 92  Temp 98.2  F (36.8  C) (Temporal)  Resp 16  Ht 5' 5.5\" (1.664 m)  Wt 129 lb (58.5 kg)  SpO2 100%  Breastfeeding? No  BMI 21.14 kg/m2 Estimated body mass index is 21.14 kg/(m^2) as calculated from the following:    Height as of this encounter: 5' 5.5\" (1.664 m).    Weight as of this encounter: 129 lb (58.5 kg)..   BP completed using cuff size: regular  Medication Rec Completed    Yusra Morejon CMA    "

## 2017-11-13 NOTE — PROGRESS NOTES
Subjective: she had laparoscopy this spring and I advised her to have Mirena IUD placed to treat the endometriosis for at least 6 months before trying to conceive again. She is here to discuss that treatment.      The past medical history, social history, past surgical history and family history as shown below have been reviewed by me today.  Past Medical History:   Diagnosis Date     Endometriosis      S/P LEEP (loop electrosurgical excision procedure)         Allergies   Allergen Reactions     Sulfa Drugs Hives     No current outpatient prescriptions on file.     Past Surgical History:   Procedure Laterality Date     AS ENLARGE BREAST WITH IMPLANT       silicone implants      SECTION      X2     CYSTECTOMY OVARIAN BENIGN       DILATION AND CURETTAGE, HYSTEROSCOPY, LAPAROSCOPY, COMBINED N/A 3/6/2017    Procedure: COMBINED DILATION AND CURETTAGE, HYSTEROSCOPY, LAPAROSCOPY;  Surgeon: Ranjith King MD;  Location: [facility] OR     ENT SURGERY      wisdom teeth extraction     GYN SURGERY      laparoscopy x 4     LAPAROSCOPIC ABLATION ENDOMETRIOSIS  3/6/2017    Procedure: LAPAROSCOPIC ABLATION ENDOMETRIOSIS;  Surgeon: Ranjith King MD;  Location: [facility] OR     LAPAROSCOPIC LYSIS ADHESIONS N/A 3/6/2017    Procedure: LAPAROSCOPIC LYSIS ADHESIONS;  Surgeon: Ranjith King MD;  Location: [facility] OR     LEEP TX, CERVICAL  2010     ORTHOPEDIC SURGERY      ankle edwin surgery (tendon repair)     Social History     Social History     Marital status:      Spouse name: N/A     Number of children: N/A     Years of education: N/A     Social History Main Topics     Smoking status: Never Smoker     Smokeless tobacco: Current User     Alcohol use 0.0 oz/week     0 Standard drinks or equivalent per week      Comment: occas     Drug use: No     Sexual activity: Yes     Partners: Male     Other Topics Concern     None     Social History Narrative    ** Merged History Encounter **          Family  "History   Problem Relation Age of Onset     Other Cancer Maternal Grandmother      ovarian     DIABETES Maternal Grandmother      DIABETES Maternal Grandfather      Colon Cancer Paternal Grandfather        ROS: A 12 point review of systems was done. Except for what is listed above in the HPI, the systems review is negative .      Objective: Vital signs: Blood pressure 110/68, pulse 92, temperature 98.2  F (36.8  C), temperature source Temporal, resp. rate 16, height 5' 5.5\" (1.664 m), weight 129 lb (58.5 kg), SpO2 100 %, not currently breastfeeding.    Pelvic exam:My nurse Yusra   was present to chaperone the exam.  The external genitalia appeared normal.    The vaginal vault was without bleeding  or   discharge   or odor.   The cervix was smooth   and shiny and normal in appearance.      No vaginal support defects were noted,    Bimanual exam revealed a 6 week sized uterus. It does not   descend   well in the vaginal vault.    No adnexal masses were felt.    There was no  cervical motion tenderness.    Exam was NOT   limited by the patient's body habitus.  She had no pain to palpation in the pelvis.             Assessment/Plan:A total of 15 minutes were spent face-to-face with this patient during today's consultation, with more than 50% of that time devoted to conversation and counseling about the management decisions.      1. History of endometriosis treated laparoscopically over 6 months ago and then Mirena IUD was placed- she is asymptomatic now.    2. I advised her to remove the IUD since she wants to try to conceive now. She agreed and asked to remove it today. I removed it with a ring forceps without difficulty.  It took approximately 2 extra minutes.    3. She will start a prenatal vitamin.    4. She will contact us if unable to conceive within 4 months or so.    SRUTHI King MD              "

## 2017-11-13 NOTE — MR AVS SNAPSHOT
"              After Visit Summary   2017    Debbie Gage    MRN: [id number]           Patient Information     Date Of Birth          1985        Visit Information        Provider Department      2017 1:20 PM Ranjith King MD [facility]         Follow-ups after your visit        Who to contact     If you have questions or need follow up information about today's clinic visit or your schedule please contact [facility] directly at [phone].  Normal or non-critical lab and imaging results will be communicated to you by MyChart, letter or phone within 4 business days after the clinic has received the results. If you do not hear from us within 7 days, please contact the clinic through [facility] or phone. If you have a critical or abnormal lab result, we will notify you by phone as soon as possible.  Submit refill requests through [employer] or call your pharmacy and they will forward the refill request to us. Please allow 3 business days for your refill to be completed.          Additional Information About Your Visit        [facility] Information     [employer] lets you send messages to your doctor, view your test results, renew your prescriptions, schedule appointments and more. To sign up, go to [email or web address] . Click on \"Log in\" on the left side of the screen, which will take you to the Welcome page. Then click on \"Sign up Now\" on the right side of the page.     You will be asked to enter the access code listed below, as well as some personal information. Please follow the directions to create your username and password.     Your access code is: [email or web address]  Expires: 2018  1:39 PM     Your access code will  in 90 days. If you need help or a new code, please call your [facility] or [phone].        Care EveryWhere ID     This is your Care EveryWhere ID. This could be used by other organizations to access your [place] medical " "records  [id number]        Your Vitals Were     Pulse Temperature Respirations Height Pulse Oximetry Breastfeeding?    92 98.2  F (36.8  C) (Temporal) 16 5' 5.5\" (1.664 m) 100% No    BMI (Body Mass Index)                   21.14 kg/m2            Blood Pressure from Last 3 Encounters:   11/13/17 110/68   03/09/17 (!) 82/60   03/06/17 119/51    Weight from Last 3 Encounters:   11/13/17 129 lb (58.5 kg)   03/09/17 127 lb (57.6 kg)   03/01/17 124 lb (56.2 kg)              Today, you had the following     No orders found for display       Primary Care Provider Office Phone # Fax #    Shelli Morrison, [facility] [phone] [address] [place] [address]        Equal Access to Services     JESSICA [facility] : Hadii adrian mcmanus hadasho Soomaali, waaxda luqadaha, qaybta kaalmada adeegyada, waxay angélicain haydeborah persaud . So [facility] [phone].    ATENCIÓN: Si habla español, tiene a hernandez disposición servicios gratuitos de asistencia lingüística. Llame al [phone].    We comply with applicable federal civil rights laws and [place] laws. We do not discriminate on the basis of race, color, national origin, age, disability, sex, sexual orientation, or gender identity.            Thank you!     Thank you for choosing [facility]  for your care. Our goal is always to provide you with excellent care. Hearing back from our patients is one way we can continue to improve our services. Please take a few minutes to complete the written survey that you may receive in the mail after your visit with us. Thank you!             Your Updated Medication List - Protect others around you: Learn how to safely use, store and throw away your medicines at www.disposemymeds.org.      Notice  As of 11/13/2017  1:39 PM    You have not been prescribed any medications.      "

## 2017-11-26 ENCOUNTER — HOSPITAL ENCOUNTER (EMERGENCY)
Facility: CLINIC | Age: 32
Discharge: HOME | End: 2017-11-26
Attending: FAMILY MEDICINE | Admitting: FAMILY MEDICINE
Payer: COMMERCIAL

## 2017-11-26 VITALS
SYSTOLIC BLOOD PRESSURE: 106 MMHG | HEART RATE: 78 BPM | DIASTOLIC BLOOD PRESSURE: 75 MMHG | TEMPERATURE: 98.1 F | RESPIRATION RATE: 12 BRPM | OXYGEN SATURATION: 99 %

## 2017-11-26 DIAGNOSIS — N39.0 URINARY TRACT INFECTION WITHOUT HEMATURIA, SITE UNSPECIFIED: ICD-10-CM

## 2017-11-26 LAB
ALBUMIN SERPL-MCNC: 4.4 G/DL (ref 3.4–5)
ALBUMIN UR-MCNC: NEGATIVE MG/DL
ALP SERPL-CCNC: 64 U/L (ref 40–150)
ALT SERPL W P-5'-P-CCNC: 11 U/L (ref 0–50)
ANION GAP SERPL CALCULATED.3IONS-SCNC: 9 MMOL/L (ref 3–14)
APPEARANCE UR: ABNORMAL
AST SERPL W P-5'-P-CCNC: 12 U/L (ref 0–45)
BACTERIA #/AREA URNS HPF: ABNORMAL /HPF
BASOPHILS # BLD AUTO: 0 10E9/L (ref 0–0.2)
BASOPHILS NFR BLD AUTO: 0.4 %
BILIRUB SERPL-MCNC: 0.9 MG/DL (ref 0.2–1.3)
BILIRUB UR QL STRIP: NEGATIVE
BUN SERPL-MCNC: 8 MG/DL (ref 7–30)
CALCIUM SERPL-MCNC: 9.1 MG/DL (ref 8.5–10.1)
CHLORIDE SERPL-SCNC: 104 MMOL/L (ref 94–109)
CO2 SERPL-SCNC: 27 MMOL/L (ref 20–32)
COLOR UR AUTO: YELLOW
CREAT SERPL-MCNC: 0.63 MG/DL (ref 0.52–1.04)
DIFFERENTIAL METHOD BLD: NORMAL
EOSINOPHIL # BLD AUTO: 0.2 10E9/L (ref 0–0.7)
EOSINOPHIL NFR BLD AUTO: 2.3 %
ERYTHROCYTE [DISTWIDTH] IN BLOOD BY AUTOMATED COUNT: 12.8 % (ref 10–15)
GFR SERPL CREATININE-BSD FRML MDRD: >90 ML/MIN/1.7M2
GLUCOSE SERPL-MCNC: 87 MG/DL (ref 70–99)
GLUCOSE UR STRIP-MCNC: NEGATIVE MG/DL
HCG UR QL: NEGATIVE
HCT VFR BLD AUTO: 40.3 % (ref 35–47)
HGB BLD-MCNC: 13.3 G/DL (ref 11.7–15.7)
HGB UR QL STRIP: ABNORMAL
IMM GRANULOCYTES # BLD: 0 10E9/L (ref 0–0.4)
IMM GRANULOCYTES NFR BLD: 0.1 %
KETONES UR STRIP-MCNC: NEGATIVE MG/DL
LEUKOCYTE ESTERASE UR QL STRIP: ABNORMAL
LIPASE SERPL-CCNC: 140 U/L (ref 73–393)
LYMPHOCYTES # BLD AUTO: 2.1 10E9/L (ref 0.8–5.3)
LYMPHOCYTES NFR BLD AUTO: 27.5 %
MCH RBC QN AUTO: 29.2 PG (ref 26.5–33)
MCHC RBC AUTO-ENTMCNC: 33 G/DL (ref 31.5–36.5)
MCV RBC AUTO: 89 FL (ref 78–100)
MONOCYTES # BLD AUTO: 0.5 10E9/L (ref 0–1.3)
MONOCYTES NFR BLD AUTO: 7.2 %
MUCOUS THREADS #/AREA URNS LPF: PRESENT /LPF
NEUTROPHILS # BLD AUTO: 4.7 10E9/L (ref 1.6–8.3)
NEUTROPHILS NFR BLD AUTO: 62.5 %
NITRATE UR QL: NEGATIVE
PH UR STRIP: 7 PH (ref 5–7)
PLATELET # BLD AUTO: 216 10E9/L (ref 150–450)
POTASSIUM SERPL-SCNC: 3.8 MMOL/L (ref 3.4–5.3)
PROT SERPL-MCNC: 7.8 G/DL (ref 6.8–8.8)
RBC # BLD AUTO: 4.55 10E12/L (ref 3.8–5.2)
RBC #/AREA URNS AUTO: 1 /HPF (ref 0–2)
SODIUM SERPL-SCNC: 140 MMOL/L (ref 133–144)
SOURCE: ABNORMAL
SP GR UR STRIP: 1 (ref 1–1.03)
SQUAMOUS #/AREA URNS AUTO: 7 /HPF (ref 0–1)
UROBILINOGEN UR STRIP-MCNC: 0 MG/DL (ref 0–2)
WBC # BLD AUTO: 7.6 10E9/L (ref 4–11)
WBC #/AREA URNS AUTO: 70 /HPF (ref 0–2)

## 2017-11-26 PROCEDURE — 81001 URINALYSIS AUTO W/SCOPE: CPT | Performed by: FAMILY MEDICINE

## 2017-11-26 PROCEDURE — 99285 EMERGENCY DEPT VISIT HI MDM: CPT | Mod: Z6 | Performed by: FAMILY MEDICINE

## 2017-11-26 PROCEDURE — 80053 COMPREHEN METABOLIC PANEL: CPT | Performed by: FAMILY MEDICINE

## 2017-11-26 PROCEDURE — 85025 COMPLETE CBC W/AUTO DIFF WBC: CPT | Performed by: FAMILY MEDICINE

## 2017-11-26 PROCEDURE — 99285 EMERGENCY DEPT VISIT HI MDM: CPT | Mod: 25 | Performed by: FAMILY MEDICINE

## 2017-11-26 PROCEDURE — 83690 ASSAY OF LIPASE: CPT | Performed by: FAMILY MEDICINE

## 2017-11-26 PROCEDURE — 25000128 H RX IP 250 OP 636: Performed by: FAMILY MEDICINE

## 2017-11-26 PROCEDURE — 81025 URINE PREGNANCY TEST: CPT | Performed by: FAMILY MEDICINE

## 2017-11-26 PROCEDURE — 96374 THER/PROPH/DIAG INJ IV PUSH: CPT | Performed by: FAMILY MEDICINE

## 2017-11-26 RX ORDER — PHENAZOPYRIDINE HYDROCHLORIDE 200 MG/1
200 TABLET, FILM COATED ORAL 3 TIMES DAILY
Qty: 9 TABLET | Refills: 0 | Status: SHIPPED | OUTPATIENT
Start: 2017-11-26 | End: 2017-11-29

## 2017-11-26 RX ORDER — HYDROCODONE BITARTRATE AND ACETAMINOPHEN 5; 325 MG/1; MG/1
1-2 TABLET ORAL EVERY 4 HOURS PRN
Qty: 5 TABLET | Refills: 0 | Status: SHIPPED | OUTPATIENT
Start: 2017-11-26 | End: 2018-01-15

## 2017-11-26 RX ORDER — CIPROFLOXACIN 500 MG/1
500 TABLET, FILM COATED ORAL 2 TIMES DAILY
Qty: 20 TABLET | Refills: 0 | Status: SHIPPED | OUTPATIENT
Start: 2017-11-26 | End: 2018-01-15

## 2017-11-26 RX ORDER — MORPHINE SULFATE 4 MG/ML
4 INJECTION, SOLUTION INTRAMUSCULAR; INTRAVENOUS
Status: DISCONTINUED | OUTPATIENT
Start: 2017-11-26 | End: 2017-11-26 | Stop reason: HOSPADM

## 2017-11-26 RX ADMIN — MORPHINE SULFATE 4 MG: 4 INJECTION, SOLUTION INTRAMUSCULAR; INTRAVENOUS at 08:18

## 2017-11-26 NOTE — ED AVS SNAPSHOT
[facility] Emergency Department    [address] [place] [address]    Phone:  [phone]    Fax:  [phone]                                       Debbie Gage   MRN: [id number]    Department:  [facility] Emergency Department   Date of Visit:  11/26/2017           Patient Information     Date Of Birth          1985        Your diagnoses for this visit were:     Urinary tract infection without hematuria, site unspecified        You were seen by Cordell Peterson MD.      Follow-up Information     Follow up with Shelli Morrison APRN CNP. Schedule an appointment as soon as possible for a visit in 3 days.    Why:  If not improving.    Contact information:    [address]   [place] [address] [phone]          Discharge Instructions         Understanding Urinary Tract Infections (UTIs)  Most UTIs are caused by bacteria, although they may also be caused by viruses or fungi. Bacteria from the bowel are the most common source of infection. The infection may start because of any of the following:    Sexual activity. During sex, bacteria can travel from the penis, vagina, or rectum into the urethra.     Bacteria on the skin outside the rectum may travel into the urethra. This is more common in women since the rectum and urethra are closer to each other than in men. Wiping from front to back after using the toilet and keeping the area clean can help prevent germs from getting to the urethra.    Blockage of urine flow through the urinary tract. If urine sits too long, germs may start to grow out of control.      Parts of the urinary tract  The infection can occur in any part of the urinary tract.    The kidneys collect and store urine.    The ureters carry urine from the kidneys to the bladder.    The bladder holds urine until you are ready to let it out.    The urethra carries urine from the bladder out of the body. It is shorter in women, so bacteria can move  through it more easily. The urethra is longer in men, so a UTI is less likely to reach the bladder or kidneys in men.  Date Last Reviewed: 1/1/2017 2000-2017 [employer]. [address] [place] [address], [place] [address]. All rights reserved. This information is not intended as a substitute for professional medical care. Always follow your healthcare professional's instructions.          24 Hour Appointment Hotline       To make an appointment at any [facility], call [phone] ([phone]). If you don't have a family doctor or clinic, we will help you find one. [facility] clinics are conveniently located to serve the needs of you and your family.             Review of your medicines      START taking        Dose / Directions Last dose taken    ciprofloxacin 500 MG tablet   Commonly known as:  CIPRO   Dose:  500 mg   Quantity:  20 tablet        Take 1 tablet (500 mg) by mouth 2 times daily   Refills:  0        HYDROcodone-acetaminophen 5-325 MG per tablet   Commonly known as:  NORCO   Dose:  1-2 tablet   Quantity:  5 tablet        Take 1-2 tablets by mouth every 4 hours as needed for moderate to severe pain   Refills:  0        phenazopyridine 200 MG tablet   Commonly known as:  PYRIDIUM   Dose:  200 mg   Quantity:  9 tablet        Take 1 tablet (200 mg) by mouth 3 times daily for 3 days   Refills:  0                Prescriptions were sent or printed at these locations (3 Prescriptions)                   [facility] [address]    [address] [facility] [address]    Telephone:  [phone]   Fax:  [phone]   Hours:                  E-Prescribed (2 of 3)         ciprofloxacin (CIPRO) 500 MG tablet               phenazopyridine (PYRIDIUM) 200 MG tablet                 Printed at Department/Unit printer (1 of 3)         HYDROcodone-acetaminophen (NORCO) 5-325 MG per tablet                Procedures and tests performed during your visit     CBC with  "platelets differential    Comprehensive metabolic panel    HCG qualitative urine    Lipase    Peripheral IV: Standard    UA with Microscopic      Orders Needing Specimen Collection     None      Pending Results     No orders found from 2017 to 2017.            Pending Culture Results     No orders found from 2017 to 2017.            Pending Results Instructions     If you had any lab results that were not finalized at the time of your Discharge, you can call the ED Lab Result RN at [phone]. You will be contacted by this team for any positive Lab results or changes in treatment. The nurses are available 7 days a week from 10A to 6:30P.  You can leave a message 24 hours per day and they will return your call.        Thank you for choosing [facility]       Thank you for choosing [facility] for your care. Our goal is always to provide you with excellent care. Hearing back from our patients is one way we can continue to improve our services. Please take a few minutes to complete the written survey that you may receive in the mail after you visit with us. Thank you!        [employer] Information     [employer] lets you send messages to your doctor, view your test results, renew your prescriptions, schedule appointments and more. To sign up, go to [email or web address] . Click on \"Log in\" on the left side of the screen, which will take you to the Welcome page. Then click on \"Sign up Now\" on the right side of the page.     You will be asked to enter the access code listed below, as well as some personal information. Please follow the directions to create your username and password.     Your access code is: [email or web address]  Expires: 2018  1:39 PM     Your access code will  in 90 days. If you need help or a new code, please call your [facility] clinic or [phone].        Care EveryWhere ID     This is your Care EveryWhere ID. This could be used by other organizations to access your [facility] " medical records  [id number]        Equal Access to Services     JOSE R MARTINEZ : Yossi Mir, osvaldo magdaleno, kevin boone. So [facility] [phone].    ATENCIÓN: Si habla español, tiene a hernandez disposición servicios gratuitos de asistencia lingüística. Llame al [phone].    We comply with applicable federal civil rights laws and [place] laws. We do not discriminate on the basis of race, color, national origin, age, disability, sex, sexual orientation, or gender identity.            After Visit Summary       This is your record. Keep this with you and show to your community pharmacist(s) and doctor(s) at your next visit.

## 2017-11-26 NOTE — ED AVS SNAPSHOT
[facility] Emergency Department    [address] [place] [address]    Phone:  [phone]    Fax:  [phone]                                       Debbie Gage   MRN: [id number]    Department:  [facility] Emergency Department   Date of Visit:  11/26/2017           After Visit Summary Signature Page     I have received my discharge instructions, and my questions have been answered. I have discussed any challenges I see with this plan with the nurse or doctor.    ..........................................................................................................................................  Patient/Patient Representative Signature      ..........................................................................................................................................  Patient Representative Print Name and Relationship to Patient    ..................................................               ................................................  Date                                            Time    ..........................................................................................................................................  Reviewed by Signature/Title    ...................................................              ..............................................  Date                                                            Time

## 2017-11-26 NOTE — DISCHARGE INSTRUCTIONS
Understanding Urinary Tract Infections (UTIs)  Most UTIs are caused by bacteria, although they may also be caused by viruses or fungi. Bacteria from the bowel are the most common source of infection. The infection may start because of any of the following:    Sexual activity. During sex, bacteria can travel from the penis, vagina, or rectum into the urethra.     Bacteria on the skin outside the rectum may travel into the urethra. This is more common in women since the rectum and urethra are closer to each other than in men. Wiping from front to back after using the toilet and keeping the area clean can help prevent germs from getting to the urethra.    Blockage of urine flow through the urinary tract. If urine sits too long, germs may start to grow out of control.      Parts of the urinary tract  The infection can occur in any part of the urinary tract.    The kidneys collect and store urine.    The ureters carry urine from the kidneys to the bladder.    The bladder holds urine until you are ready to let it out.    The urethra carries urine from the bladder out of the body. It is shorter in women, so bacteria can move through it more easily. The urethra is longer in men, so a UTI is less likely to reach the bladder or kidneys in men.  Date Last Reviewed: 1/1/2017 2000-2017 [employer]. [address] [place] [address], [place] [address]. All rights reserved. This information is not intended as a substitute for professional medical care. Always follow your healthcare professional's instructions.

## 2017-11-26 NOTE — ED PROVIDER NOTES
"  History     Chief Complaint   Patient presents with     Rule out Urinary Tract Infection     HPI  Debbie Gage is a 32 year old female who presents with lower abdominal pain and back pain the start around 3 this morning.  She states that she also feels like she's not able to urinate correctly in there some discomfort with urination.  Patients had bladder infections before but is also had ovarian cyst and endometriosis.  She is unsure what this pain feels like.  She denies any fevers or chills.  She denies any nausea or vomiting.  Bowel movements have been normal.    Problem List:    Patient Active Problem List    Diagnosis Date Noted     S/P LEEP of cervix      Priority: Medium      Abnormal pap that required LEEP. Pathology unknown. \"did have all of her follow-up Pap smears, all of which were negative. She has been back on routine screening\" (per visit notes on 17)  12 NIL pap  17 NIL pap/neg HR HPV. Plan: cotest in 3 years.           Endometriosis 2017     Priority: Medium        Past Medical History:    Past Medical History:   Diagnosis Date     Endometriosis      S/P LEEP (loop electrosurgical excision procedure)        Past Surgical History:    Past Surgical History:   Procedure Laterality Date     AS ENLARGE BREAST WITH IMPLANT       silicone implants      SECTION      X2     CYSTECTOMY OVARIAN BENIGN       DILATION AND CURETTAGE, HYSTEROSCOPY, LAPAROSCOPY, COMBINED N/A 3/6/2017    Procedure: COMBINED DILATION AND CURETTAGE, HYSTEROSCOPY, LAPAROSCOPY;  Surgeon: Ranjith King MD;  Location: [facility] OR     ENT SURGERY      wisdom teeth extraction     GYN SURGERY      laparoscopy x 4     LAPAROSCOPIC ABLATION ENDOMETRIOSIS  3/6/2017    Procedure: LAPAROSCOPIC ABLATION ENDOMETRIOSIS;  Surgeon: Ranjith King MD;  Location: [facility] OR     LAPAROSCOPIC LYSIS ADHESIONS N/A 3/6/2017    Procedure: LAPAROSCOPIC LYSIS ADHESIONS;  Surgeon: Ranjith King " MD [place];  Location: [facility] [place], CERVICAL  2010     ORTHOPEDIC SURGERY      ankle edwin surgery (tendon repair)       Family History:    Family History   Problem Relation Age of Onset     Other Cancer Maternal Grandmother      ovarian     DIABETES Maternal Grandmother      DIABETES Maternal Grandfather      Colon Cancer Paternal Grandfather        Social History:  Marital Status:   [2]  Social History   Substance Use Topics     Smoking status: Never Smoker     Smokeless tobacco: Current User     Alcohol use 0.0 oz/week     0 Standard drinks or equivalent per week      Comment: occas        Medications:      No current outpatient prescriptions on file.      Review of Systems   All other systems reviewed and are negative.      Physical Exam   BP: 106/75  Pulse: 78  Temp: 98.1  F (36.7  C)  Resp: 12  SpO2: 99 %      Physical Exam   Constitutional: She is oriented to person, place, and time. She appears well-developed and well-nourished. She appears distressed (very anxious appearing).   HENT:   Head: Normocephalic and atraumatic.   Mouth/Throat: Oropharynx is clear and moist. No oropharyngeal exudate.   Eyes: Conjunctivae are normal.   Cardiovascular: Normal rate and regular rhythm.    Pulmonary/Chest: Effort normal and breath sounds normal. No respiratory distress. She has no wheezes. She has no rales.   Abdominal: Soft. She exhibits no distension. There is tenderness (llq/rlq/suprapubic). There is no rebound and no guarding.   Musculoskeletal:        Back:    Neurological: She is alert and oriented to person, place, and time.   Skin: She is not diaphoretic.   Nursing note and vitals reviewed.      ED Course     ED Course     Procedures           Results for orders placed or performed during the hospital encounter of 11/26/17   UA with Microscopic   Result Value Ref Range    Color Urine Yellow     Appearance Urine Slightly Cloudy     Glucose Urine Negative NEG^Negative mg/dL    Bilirubin Urine Negative  NEG^Negative    Ketones Urine Negative NEG^Negative mg/dL    Specific Gravity Urine 1.005 1.003 - 1.035    Blood Urine Moderate (A) NEG^Negative    pH Urine 7.0 5.0 - 7.0 pH    Protein Albumin Urine Negative NEG^Negative mg/dL    Urobilinogen mg/dL 0.0 0.0 - 2.0 mg/dL    Nitrite Urine Negative NEG^Negative    Leukocyte Esterase Urine Large (A) NEG^Negative    Source Midstream Urine     WBC Urine 70 (H) 0 - 2 /HPF    RBC Urine 1 0 - 2 /HPF    Bacteria Urine Moderate (A) NEG^Negative /HPF    Squamous Epithelial /HPF Urine 7 (H) 0 - 1 /HPF    Mucous Urine Present (A) NEG^Negative /LPF   HCG qualitative urine   Result Value Ref Range    HCG Qual Urine Negative NEG^Negative   CBC with platelets differential   Result Value Ref Range    WBC 7.6 4.0 - 11.0 10e9/L    RBC Count 4.55 3.8 - 5.2 10e12/L    Hemoglobin 13.3 11.7 - 15.7 g/dL    Hematocrit 40.3 35.0 - 47.0 %    MCV 89 78 - 100 fl    MCH 29.2 26.5 - 33.0 pg    MCHC 33.0 31.5 - 36.5 g/dL    RDW 12.8 10.0 - 15.0 %    Platelet Count 216 150 - 450 10e9/L    Diff Method Automated Method     % Neutrophils 62.5 %    % Lymphocytes 27.5 %    % Monocytes 7.2 %    % Eosinophils 2.3 %    % Basophils 0.4 %    % Immature Granulocytes 0.1 %    Absolute Neutrophil 4.7 1.6 - 8.3 10e9/L    Absolute Lymphocytes 2.1 0.8 - 5.3 10e9/L    Absolute Monocytes 0.5 0.0 - 1.3 10e9/L    Absolute Eosinophils 0.2 0.0 - 0.7 10e9/L    Absolute Basophils 0.0 0.0 - 0.2 10e9/L    Abs Immature Granulocytes 0.0 0 - 0.4 10e9/L   Comprehensive metabolic panel   Result Value Ref Range    Sodium 140 133 - 144 mmol/L    Potassium 3.8 3.4 - 5.3 mmol/L    Chloride 104 94 - 109 mmol/L    Carbon Dioxide 27 20 - 32 mmol/L    Anion Gap 9 3 - 14 mmol/L    Glucose 87 70 - 99 mg/dL    Urea Nitrogen 8 7 - 30 mg/dL    Creatinine 0.63 0.52 - 1.04 mg/dL    GFR Estimate >90 >60 mL/min/1.7m2    GFR Estimate If Black >90 >60 mL/min/1.7m2    Calcium 9.1 8.5 - 10.1 mg/dL    Bilirubin Total 0.9 0.2 - 1.3 mg/dL    Albumin 4.4  3.4 - 5.0 g/dL    Protein Total 7.8 6.8 - 8.8 g/dL    Alkaline Phosphatase 64 40 - 150 U/L    ALT 11 0 - 50 U/L    AST 12 0 - 45 U/L   Lipase   Result Value Ref Range    Lipase 140 73 - 393 U/L     Medications   morphine (PF) injection 4 mg (4 mg Intravenous Given 11/26/17 0818)     urine seems consistent with a urinary tract infection.  Because of her history and her dramatic presentation, I did do blood work to see if anything else can be going on but this is all normal.  With her urine being very obvious for bladder infection, I think this is most likely the cause for symptoms.  I will start her on a course of Cipro, with her back pain, even though it does seem more muscular, I'm going to cover for possible kidney involvement also.  Patient was given some Pyridium and given 5 any use for any breakthrough pain.  She was told to follow-up with her doctor physical improvement in the next 2 to 3 days.    Assessments & Plan (with Medical Decision Making)     I have reviewed the nursing notes.    I have reviewed the findings, diagnosis, plan and need for follow up with the patient.              11/26/2017   [facility] [place] EMERGENCY DEPARTMENT     Cordell Peterson MD  11/26/17 0901

## 2018-01-13 ENCOUNTER — HOSPITAL ENCOUNTER (EMERGENCY)
Facility: CLINIC | Age: 33
Discharge: HOME | End: 2018-01-13
Attending: NURSE PRACTITIONER | Admitting: NURSE PRACTITIONER
Payer: COMMERCIAL

## 2018-01-13 VITALS
OXYGEN SATURATION: 100 % | TEMPERATURE: 101.9 F | RESPIRATION RATE: 20 BRPM | SYSTOLIC BLOOD PRESSURE: 93 MMHG | WEIGHT: 120 LBS | BODY MASS INDEX: 19.67 KG/M2 | DIASTOLIC BLOOD PRESSURE: 63 MMHG

## 2018-01-13 DIAGNOSIS — J11.1 INFLUENZA-LIKE ILLNESS: ICD-10-CM

## 2018-01-13 LAB
ALBUMIN SERPL-MCNC: 4.1 G/DL (ref 3.4–5)
ALP SERPL-CCNC: 54 U/L (ref 40–150)
ALT SERPL W P-5'-P-CCNC: 13 U/L (ref 0–50)
ANION GAP SERPL CALCULATED.3IONS-SCNC: 10 MMOL/L (ref 3–14)
AST SERPL W P-5'-P-CCNC: 13 U/L (ref 0–45)
BASOPHILS # BLD AUTO: 0 10E9/L (ref 0–0.2)
BASOPHILS NFR BLD AUTO: 1.4 %
BILIRUB SERPL-MCNC: 0.3 MG/DL (ref 0.2–1.3)
BUN SERPL-MCNC: 6 MG/DL (ref 7–30)
CALCIUM SERPL-MCNC: 8.3 MG/DL (ref 8.5–10.1)
CHLORIDE SERPL-SCNC: 104 MMOL/L (ref 94–109)
CO2 SERPL-SCNC: 23 MMOL/L (ref 20–32)
CREAT SERPL-MCNC: 0.54 MG/DL (ref 0.52–1.04)
DIFFERENTIAL METHOD BLD: ABNORMAL
EOSINOPHIL # BLD AUTO: 0.1 10E9/L (ref 0–0.7)
EOSINOPHIL NFR BLD AUTO: 2.4 %
ERYTHROCYTE [DISTWIDTH] IN BLOOD BY AUTOMATED COUNT: 13.6 % (ref 10–15)
FLUAV+FLUBV AG SPEC QL: NEGATIVE
FLUAV+FLUBV AG SPEC QL: NEGATIVE
GFR SERPL CREATININE-BSD FRML MDRD: >90 ML/MIN/1.7M2
GLUCOSE SERPL-MCNC: 94 MG/DL (ref 70–99)
HCT VFR BLD AUTO: 37.1 % (ref 35–47)
HGB BLD-MCNC: 12.3 G/DL (ref 11.7–15.7)
IMM GRANULOCYTES # BLD: 0 10E9/L (ref 0–0.4)
IMM GRANULOCYTES NFR BLD: 0.7 %
LACTATE BLD-SCNC: 1.2 MMOL/L (ref 0.7–2)
LYMPHOCYTES # BLD AUTO: 0.6 10E9/L (ref 0.8–5.3)
LYMPHOCYTES NFR BLD AUTO: 21.2 %
MCH RBC QN AUTO: 29.6 PG (ref 26.5–33)
MCHC RBC AUTO-ENTMCNC: 33.2 G/DL (ref 31.5–36.5)
MCV RBC AUTO: 89 FL (ref 78–100)
MONOCYTES # BLD AUTO: 0.3 10E9/L (ref 0–1.3)
MONOCYTES NFR BLD AUTO: 11 %
NEUTROPHILS # BLD AUTO: 1.9 10E9/L (ref 1.6–8.3)
NEUTROPHILS NFR BLD AUTO: 63.3 %
PLATELET # BLD AUTO: 179 10E9/L (ref 150–450)
PLATELET # BLD EST: ABNORMAL 10*3/UL
POTASSIUM SERPL-SCNC: 3.6 MMOL/L (ref 3.4–5.3)
PROT SERPL-MCNC: 7.5 G/DL (ref 6.8–8.8)
RBC # BLD AUTO: 4.15 10E12/L (ref 3.8–5.2)
RBC MORPH BLD: NORMAL
SODIUM SERPL-SCNC: 137 MMOL/L (ref 133–144)
SPECIMEN SOURCE: NORMAL
WBC # BLD AUTO: 2.9 10E9/L (ref 4–11)

## 2018-01-13 PROCEDURE — 80053 COMPREHEN METABOLIC PANEL: CPT | Performed by: NURSE PRACTITIONER

## 2018-01-13 PROCEDURE — 83605 ASSAY OF LACTIC ACID: CPT | Performed by: NURSE PRACTITIONER

## 2018-01-13 PROCEDURE — 25000132 ZZH RX MED GY IP 250 OP 250 PS 637: Performed by: NURSE PRACTITIONER

## 2018-01-13 PROCEDURE — 85025 COMPLETE CBC W/AUTO DIFF WBC: CPT | Performed by: NURSE PRACTITIONER

## 2018-01-13 PROCEDURE — 99283 EMERGENCY DEPT VISIT LOW MDM: CPT | Performed by: NURSE PRACTITIONER

## 2018-01-13 PROCEDURE — 87804 INFLUENZA ASSAY W/OPTIC: CPT | Performed by: NURSE PRACTITIONER

## 2018-01-13 PROCEDURE — 99284 EMERGENCY DEPT VISIT MOD MDM: CPT | Mod: Z6 | Performed by: NURSE PRACTITIONER

## 2018-01-13 RX ORDER — AZITHROMYCIN 250 MG/1
TABLET, FILM COATED ORAL
Qty: 6 TABLET | Refills: 0 | Status: SHIPPED | OUTPATIENT
Start: 2018-01-13 | End: 2018-01-18

## 2018-01-13 RX ORDER — ACETAMINOPHEN 325 MG/1
975 TABLET ORAL ONCE
Status: COMPLETED | OUTPATIENT
Start: 2018-01-13 | End: 2018-01-13

## 2018-01-13 RX ORDER — OSELTAMIVIR PHOSPHATE 75 MG/1
75 CAPSULE ORAL 2 TIMES DAILY
Qty: 10 CAPSULE | Refills: 0 | Status: SHIPPED | OUTPATIENT
Start: 2018-01-13 | End: 2018-01-18

## 2018-01-13 RX ADMIN — ACETAMINOPHEN 975 MG: 325 TABLET ORAL at 17:49

## 2018-01-13 ASSESSMENT — ENCOUNTER SYMPTOMS
ACTIVITY CHANGE: 1
APPETITE CHANGE: 1
CHEST TIGHTNESS: 1
COUGH: 1
FEVER: 1
CHILLS: 1
MYALGIAS: 1

## 2018-01-13 NOTE — ED NOTES
"Attempted IV x1. Pt very particular of IV site. Pt requested lateral wrist IV. Location was very valvy. IV blew. Pt started crying hysterically. Writer was able to get blood. Pt states \"I'll just drink a bunch of water.\" Writer informed pt she would ask provider if this is ok. Domi states that PO intake is ok.    "

## 2018-01-13 NOTE — ED AVS SNAPSHOT
[facility] Emergency Department    [address]     [place] [address]    Phone:  [phone]    Fax:  [phone]                                       Debbie Gage   MRN: [id number]    Department:  [facility] Emergency Department   Date of Visit:  1/13/2018           Patient Information     Date Of Birth          1985        Your diagnoses for this visit were:     Influenza-like illness        You were seen by Kandace Kennedy APRN CNP.      Follow-up Information     Follow up with Shelli Morrison APRN CNP In 3 days.    Specialty:  Nurse Practitioner - Family    Contact information:    [address]   [place] [id number] [phone]          Follow up with [facility] Emergency Department.    Specialty:  EMERGENCY MEDICINE    Why:  If symptoms worsen    Contact information:    [address] [place] [id number] [phone]    Additional information:    From [address]: Exit at "[employer] on south side of [place]. Turn right on [address]. Turn left at stoplight on [address]. [facility] will be in view two blocks ahead        Discharge Instructions         Influenza (Flu) and Pregnancy  Influenza ( the flu ) is an infection of the respiratory tract. The tract is made up of your mouth, nose, and lungs, and the passages between them. The flu can make a pregnant woman very ill. This is because pregnant women are at high risk for flu complications. These complications include sinus infections and serious lung infections such as bronchitis and pneumonia. In rare cases, the flu can lead to miscarriage of the baby or even death of the mother. This sheet tells you more about the flu, what to do if you come down with the flu, and what you can do to avoid infection.     Washing your hands often with soap and water can help keep you from catching the flu virus.   Who is at risk for the flu?  Anyone can get the flu. But you are more  likely to catch the flu if you:    Are often around young children    Work in a healthcare setting where you may be exposed to flu germs    Live or work with someone who has the flu    Haven t had an annual flu shot  How does the flu spread?  The flu is caused by a type of germ called a virus. The germ spreads through the air in droplets when someone who has the flu coughs, sneezes, laughs, or talks. You can become infected when you inhale the germ directly. You can also become infected when you touch a surface on which the droplets have landed and then touch your eyes, nose, or mouth. Touching used tissues, or sharing utensils, drinking glasses, or a toothbrush with an infected person can expose you to the flu germ, too.  What are the symptoms of the flu?  Flu symptoms tend to come on quickly and may last a few days to a few weeks. They include:    Fever that's usually higher than 100.4 F (38 C) and chills    Sore throat and headache    Dry cough    Runny nose    Tiredness and weakness    Body and muscle aches  If you are pregnant and have flu-like symptoms  Here are some suggestions:    Call your healthcare provider right away. Follow any instructions your healthcare provider gives you.    You may be asked to get tested to confirm that you have the flu.    Your healthcare provider may prescribe medicines called antivirals. These medicines must be taken within 2 days of when your symptoms started. In some cases, your healthcare provider may not wait for test results to come back before starting you on antivirals. These medicines work by stopping the flu virus from reproducing in your body. This gives your body s immune system a chance to fight the virus. After taking the medicine, your symptoms may be milder and you may recover quicker than without the medicine. The medicine may also prevent serious complications, like pneumonia.    If you feel you need medicines to relieve symptoms, ask your healthcare  provider which ones are safe for you to take.  Easing flu symptoms    Drink lots of fluids such as water, juice, and warm soup to prevent dehydration. A good rule is to drink enough so that you urinate your normal amount. Feeling dizzy or lightheaded most likely means you need to drink more fluid.    Get plenty of rest.    If you aren't hungry, eat smaller meals more often during the day to make sure you get enough calories.    If you don't have a fever, put warm compresses on your forehead or sinuses to relieve congestion.    Call your healthcare provider if you become short of breath.  Preventing the flu    Get vaccinated. One of the best ways to avoid the flu is to get a flu vaccine. Pregnant women can safely get a flu shot. But pregnant women should not get the nasal spray vaccine for the flu. This is a live-virus vaccine and may be harmful to the baby. The nasal spray is not recommended for the 2763-9436 flu season. The CDC says this is because the nasal spray did not seem to protect against the flu over the last several flu seasons. In the past, it was meant for non-pregnant people ages 2 to 49.    Wash your hands often. Frequent handwashing is a proven way to prevent infection. Carry an alcohol-based hand gel containing at least 60% alcohol. Use it when you don t have access to soap and water.    Clean items you use often with disinfectant wipes. This includes phones, computer keyboards, and toys.    Avoid crowds and children as much as possible while you are pregnant. Stay away from anyone who has the flu.  Tips for good handwashing  Handwashing is one of the best ways to prevent many common infections. Follow these steps for more effective handwashing:    Use warm water and plenty of soap. Work up a good lather.    Clean the whole hand, under your nails, between your fingers, and up the wrists.    Wash for at least 20 seconds. Don t just wipe--scrub well.    Rinse, letting the water run down your fingers,  not up your wrists.    Dry your hands well. Use a paper towel to turn off the faucet and open the door.  Using alcohol-based hand gels  Alcohol-based hand gels are also a good choice for cleaning your hands. Use them when you don t have access to soap and water, or your hands don't look dirty. Follow these steps:    Squeeze about a tablespoon of gel into the palm of one hand.    Rub your hands together briskly, cleaning the backs of your hands, the palms, between your fingers, and up the wrists.    Rub until the gel is gone and your hands are completely dry.   Date Last Reviewed: 8/9/2015 2000-2017 [employer]. [address] [place] [address], [place] [address]. All rights reserved. This information is not intended as a substitute for professional medical care. Always follow your healthcare professional's instructions.          Future Appointments        Provider Department Dept Phone Center    1/15/2018 9:00 AM [facility] [phone] [facility]      24 Hour Appointment Hotline       To make an appointment at any [facility], call [phone]). If you don't have a family doctor or clinic, we will help you find one. [facility] are conveniently located to serve the needs of you and your family.             Review of your medicines      START taking        Dose / Directions Last dose taken    azithromycin 250 MG tablet   Commonly known as:  ZITHROMAX Z-MARE   Quantity:  6 tablet        Two tablets on the first day, then one tablet daily for the next 4 days   Refills:  0        oseltamivir 75 MG capsule   Commonly known as:  TAMIFLU   Dose:  75 mg   Quantity:  10 capsule        Take 1 capsule (75 mg) by mouth 2 times daily for 5 days   Refills:  0          Our records show that you are taking the medicines listed below. If these are incorrect, please call your family doctor or clinic.        Dose / Directions Last dose taken    ciprofloxacin 500 MG tablet    Commonly known as:  CIPRO   Dose:  500 mg   Quantity:  20 tablet        Take 1 tablet (500 mg) by mouth 2 times daily   Refills:  0        HYDROcodone-acetaminophen 5-325 MG per tablet   Commonly known as:  NORCO   Dose:  1-2 tablet   Quantity:  5 tablet        Take 1-2 tablets by mouth every 4 hours as needed for moderate to severe pain   Refills:  0        TYLENOL PO        Refills:  0                Prescriptions were sent or printed at these locations (2 Prescriptions)                   [facility]   [address] [address]    Telephone:  [phone]   Fax:  [phone]   Hours:                  These medications are not ready yet because we are checking if your insurance will help you pay for them. Call your pharmacy to confirm that your medication is ready for pickup. It may take up to 24 hours for them to receive the prescription. If the prescription is not ready within 3 business days, please contact your clinic or your provider (2 of 2)         oseltamivir (TAMIFLU) 75 MG capsule               azithromycin (ZITHROMAX Z-MARE) 250 MG tablet                Procedures and tests performed during your visit     CBC with platelets differential    Comprehensive metabolic panel    Influenza A/B antigen    Lactic acid whole blood      Orders Needing Specimen Collection     None      Pending Results     No orders found from 1/11/2018 to 1/14/2018.            Pending Culture Results     No orders found from 1/11/2018 to 1/14/2018.            Pending Results Instructions     If you had any lab results that were not finalized at the time of your Discharge, you can call the ED Lab Result RN at [phone]. You will be contacted by this team for any positive Lab results or changes in treatment. The nurses are available 7 days a week from 10A to 6:30P.  You can leave a message 24 hours per day and they will return your call.        Thank you for choosing [facility]       Thank  "you for choosing [facility] for your care. Our goal is always to provide you with excellent care. Hearing back from our patients is one way we can continue to improve our services. Please take a few minutes to complete the written survey that you may receive in the mail after you visit with us. Thank you!        [employer] Information     [employer] lets you send messages to your doctor, view your test results, renew your prescriptions, schedule appointments and more. To sign up, go to [email or web address] . Click on \"Log in\" on the left side of the screen, which will take you to the Welcome page. Then click on \"Sign up Now\" on the right side of the page.     You will be asked to enter the access code listed below, as well as some personal information. Please follow the directions to create your username and password.     Your access code is: [email or web address]  Expires: 2018  1:39 PM     Your access code will  in 90 days. If you need help or a new code, please call your [facility] clinic or [phone].        Care EveryWhere ID     This is your Care EveryWhere ID. This could be used by other organizations to access your [facility] medical records  [id number]        Equal Access to Services     JOSE R MARTINEZ : Yossi Mir, wajosé miguelda alissaadaha, qaybta kaalmada adejanett, kevin gerber. So [facility] [phone].    ATENCIÓN: Si habla español, tiene a hernandez disposición servicios gratuitos de asistencia lingüística. Llame al [phone].    We comply with applicable federal civil rights laws and [place] laws. We do not discriminate on the basis of race, color, national origin, age, disability, sex, sexual orientation, or gender identity.            After Visit Summary       This is your record. Keep this with you and show to your community pharmacist(s) and doctor(s) at your next visit.                  "

## 2018-01-13 NOTE — ED NOTES
Pt presents with concerns of a cough and chest heaviness.  Pt states that she is very cold.  Pt states that it started yesterday with tiredness and woke with the cough today.

## 2018-01-13 NOTE — ED AVS SNAPSHOT
[facility] Emergency Department    [address] [place] [address]    Phone:  [phone]    Fax:  [phone]                                       Debbie Gage   MRN: [id number]    Department:  [facility] Emergency Department   Date of Visit:  1/13/2018           After Visit Summary Signature Page     I have received my discharge instructions, and my questions have been answered. I have discussed any challenges I see with this plan with the nurse or doctor.    ..........................................................................................................................................  Patient/Patient Representative Signature      ..........................................................................................................................................  Patient Representative Print Name and Relationship to Patient    ..................................................               ................................................  Date                                            Time    ..........................................................................................................................................  Reviewed by Signature/Title    ...................................................              ..............................................  Date                                                            Time

## 2018-01-14 NOTE — ED PROVIDER NOTES
"  History     Chief Complaint   Patient presents with     Cough     Fever     HPI  Debbie Gage is a 32 year old female who presents to the ED today with complaints of a cough and chest heaviness.  Patient endorses a fever and chills.  Patient reports her symptoms started yesterday and have progressed throughout the day.  Patient has been taking Tylenol with no relief.  Patient is an estimated 5 weeks pregnant with her third pregnancy.  She denies any pregnancy complaints.  She has not been vomiting or having diarrhea.  Patient reports she has been drinking plenty of fluids.  Patient reports she was diagnosed with influenza a month ago.    Problem List:    Patient Active Problem List    Diagnosis Date Noted     S/P LEEP of cervix      Priority: Medium      Abnormal pap that required LEEP. Pathology unknown. \"did have all of her follow-up Pap smears, all of which were negative. She has been back on routine screening\" (per visit notes on 17)  12 NIL pap  17 NIL pap/neg HR HPV. Plan: cotest in 3 years.           Endometriosis 2017     Priority: Medium        Past Medical History:    Past Medical History:   Diagnosis Date     Endometriosis      S/P LEEP (loop electrosurgical excision procedure)        Past Surgical History:    Past Surgical History:   Procedure Laterality Date     AS ENLARGE BREAST WITH IMPLANT       silicone implants      SECTION      X2     CYSTECTOMY OVARIAN BENIGN       DILATION AND CURETTAGE, HYSTEROSCOPY, LAPAROSCOPY, COMBINED N/A 3/6/2017    Procedure: COMBINED DILATION AND CURETTAGE, HYSTEROSCOPY, LAPAROSCOPY;  Surgeon: Ranjith King MD;  Location: [facility] OR     ENT SURGERY      wisdom teeth extraction     GYN SURGERY      laparoscopy x 4     LAPAROSCOPIC ABLATION ENDOMETRIOSIS  3/6/2017    Procedure: LAPAROSCOPIC ABLATION ENDOMETRIOSIS;  Surgeon: Ranjith King MD;  Location: [facility] OR     LAPAROSCOPIC LYSIS ADHESIONS N/A 3/6/2017    " Procedure: LAPAROSCOPIC LYSIS ADHESIONS;  Surgeon: Ranjith King MD;  Location: [facility] OR     LEEP TX, CERVICAL  2010     ORTHOPEDIC SURGERY      ankle edwin surgery (tendon repair)       Family History:    Family History   Problem Relation Age of Onset     Other Cancer Maternal Grandmother      ovarian     DIABETES Maternal Grandmother      DIABETES Maternal Grandfather      Colon Cancer Paternal Grandfather        Social History:  Marital Status:   [2]  Social History   Substance Use Topics     Smoking status: Never Smoker     Smokeless tobacco: Current User     Alcohol use 0.0 oz/week     0 Standard drinks or equivalent per week      Comment: occas        Medications:      Acetaminophen (TYLENOL PO)   oseltamivir (TAMIFLU) 75 MG capsule   azithromycin (ZITHROMAX Z-MARE) 250 MG tablet   ciprofloxacin (CIPRO) 500 MG tablet   HYDROcodone-acetaminophen (NORCO) 5-325 MG per tablet         Review of Systems   Constitutional: Positive for activity change, appetite change, chills and fever.   Respiratory: Positive for cough and chest tightness.    Musculoskeletal: Positive for myalgias.   All other systems reviewed and are negative.      Physical Exam   BP: 95/64  Heart Rate: 131  Temp: 101.9  F (38.8  C)  Resp: 20  Weight: 54.4 kg (120 lb)  SpO2: 100 %      Physical Exam   Constitutional: She is oriented to person, place, and time. She appears well-developed and well-nourished. She appears distressed.   HENT:   Head: Normocephalic.   Mouth/Throat: Oropharynx is clear and moist.   Eyes: Conjunctivae are normal.   Neck: Normal range of motion.   Cardiovascular: Normal heart sounds.    No murmur heard.  131, Tachycardic   Pulmonary/Chest: Breath sounds normal. She is in respiratory distress. She exhibits tenderness.   Abdominal: Soft. Bowel sounds are normal. She exhibits no distension. There is no tenderness.   Musculoskeletal: Normal range of motion.   Neurological: She is alert and oriented to person,  place, and time.   Skin: Skin is warm and dry. She is not diaphoretic.   Psychiatric: She has a normal mood and affect.       ED Course     ED Course     Procedures    Results for orders placed or performed during the hospital encounter of 01/13/18   CBC with platelets differential   Result Value Ref Range    WBC 2.9 (L) 4.0 - 11.0 10e9/L    RBC Count 4.15 3.8 - 5.2 10e12/L    Hemoglobin 12.3 11.7 - 15.7 g/dL    Hematocrit 37.1 35.0 - 47.0 %    MCV 89 78 - 100 fl    MCH 29.6 26.5 - 33.0 pg    MCHC 33.2 31.5 - 36.5 g/dL    RDW 13.6 10.0 - 15.0 %    Platelet Count 179 150 - 450 10e9/L    Diff Method Automated Method     % Neutrophils 63.3 %    % Lymphocytes 21.2 %    % Monocytes 11.0 %    % Eosinophils 2.4 %    % Basophils 1.4 %    % Immature Granulocytes 0.7 %    Absolute Neutrophil 1.9 1.6 - 8.3 10e9/L    Absolute Lymphocytes 0.6 (L) 0.8 - 5.3 10e9/L    Absolute Monocytes 0.3 0.0 - 1.3 10e9/L    Absolute Eosinophils 0.1 0.0 - 0.7 10e9/L    Absolute Basophils 0.0 0.0 - 0.2 10e9/L    Abs Immature Granulocytes 0.0 0 - 0.4 10e9/L    RBC Morphology Normal     Platelet Estimate Confirming automated cell count    Lactic acid whole blood   Result Value Ref Range    Lactic Acid 1.2 0.7 - 2.0 mmol/L   Influenza A/B antigen   Result Value Ref Range    Influenza A/B Agn Specimen Nasopharyngeal     Influenza A Negative NEG^Negative    Influenza B Negative NEG^Negative       Assessments & Plan (with Medical Decision Making)  Debbie is a 32 year old female who presents to the ED with complaints of influenza-like illness that started yesterday.  Patient is an estimated 5 weeks pregnant.  Patient is nontoxic and well-hydrated on exam, she is easily tearful and clearly does not feel well.  Influenza swab was obtained and does return negative, an IV was attempted to hydrate patient and was unsuccessful at which time patient became tearful again and has requested to drink oral fluids.  Patient was given 2 large glasses of water and was  able to drink both of these.  Patient was given Tylenol for her fever.  Her CBC returns with a low white count and low lymphocyte count consistent with viral etiology.  Patient does endorse a frequent history of pneumonia and for this reason I am going to start her on Tamiflu as well as Zithromax and avoid any radiation exposure from a chest x-ray secondary to her early gestation.  Comprehensive metabolic panel returns unremarkable and patient's lactic acid is normal at 1.2.  I discussed findings of today's exam and test results, I would like patient seen in clinic early next week, she was instructed to stay very well-hydrated and reasons to return to the emergency room were discussed in detail.  Patient is agreeable to plan of care and was discharged in stable condition.     I have reviewed the nursing notes.    I have reviewed the findings, diagnosis, plan and need for follow up with the patient.    New Prescriptions    AZITHROMYCIN (ZITHROMAX Z-MARE) 250 MG TABLET    Two tablets on the first day, then one tablet daily for the next 4 days    OSELTAMIVIR (TAMIFLU) 75 MG CAPSULE    Take 1 capsule (75 mg) by mouth 2 times daily for 5 days       Final diagnoses:   Influenza-like illness       1/13/2018   [facility] EMERGENCY DEPARTMENT     Kandace Kennedy, CHRISSY CNP  01/13/18 1817

## 2018-01-14 NOTE — DISCHARGE INSTRUCTIONS
Influenza (Flu) and Pregnancy  Influenza ( the flu ) is an infection of the respiratory tract. The tract is made up of your mouth, nose, and lungs, and the passages between them. The flu can make a pregnant woman very ill. This is because pregnant women are at high risk for flu complications. These complications include sinus infections and serious lung infections such as bronchitis and pneumonia. In rare cases, the flu can lead to miscarriage of the baby or even death of the mother. This sheet tells you more about the flu, what to do if you come down with the flu, and what you can do to avoid infection.     Washing your hands often with soap and water can help keep you from catching the flu virus.   Who is at risk for the flu?  Anyone can get the flu. But you are more likely to catch the flu if you:    Are often around young children    Work in a healthcare setting where you may be exposed to flu germs    Live or work with someone who has the flu    Haven t had an annual flu shot  How does the flu spread?  The flu is caused by a type of germ called a virus. The germ spreads through the air in droplets when someone who has the flu coughs, sneezes, laughs, or talks. You can become infected when you inhale the germ directly. You can also become infected when you touch a surface on which the droplets have landed and then touch your eyes, nose, or mouth. Touching used tissues, or sharing utensils, drinking glasses, or a toothbrush with an infected person can expose you to the flu germ, too.  What are the symptoms of the flu?  Flu symptoms tend to come on quickly and may last a few days to a few weeks. They include:    Fever that's usually higher than 100.4 F (38 C) and chills    Sore throat and headache    Dry cough    Runny nose    Tiredness and weakness    Body and muscle aches  If you are pregnant and have flu-like symptoms  Here are some suggestions:    Call your healthcare provider right away. Follow any  instructions your healthcare provider gives you.    You may be asked to get tested to confirm that you have the flu.    Your healthcare provider may prescribe medicines called antivirals. These medicines must be taken within 2 days of when your symptoms started. In some cases, your healthcare provider may not wait for test results to come back before starting you on antivirals. These medicines work by stopping the flu virus from reproducing in your body. This gives your body s immune system a chance to fight the virus. After taking the medicine, your symptoms may be milder and you may recover quicker than without the medicine. The medicine may also prevent serious complications, like pneumonia.    If you feel you need medicines to relieve symptoms, ask your healthcare provider which ones are safe for you to take.  Easing flu symptoms    Drink lots of fluids such as water, juice, and warm soup to prevent dehydration. A good rule is to drink enough so that you urinate your normal amount. Feeling dizzy or lightheaded most likely means you need to drink more fluid.    Get plenty of rest.    If you aren't hungry, eat smaller meals more often during the day to make sure you get enough calories.    If you don't have a fever, put warm compresses on your forehead or sinuses to relieve congestion.    Call your healthcare provider if you become short of breath.  Preventing the flu    Get vaccinated. One of the best ways to avoid the flu is to get a flu vaccine. Pregnant women can safely get a flu shot. But pregnant women should not get the nasal spray vaccine for the flu. This is a live-virus vaccine and may be harmful to the baby. The nasal spray is not recommended for the 2849-4597 flu season. The CDC says this is because the nasal spray did not seem to protect against the flu over the last several flu seasons. In the past, it was meant for non-pregnant people ages 2 to 49.    Wash your hands often. Frequent handwashing is a  proven way to prevent infection. Carry an alcohol-based hand gel containing at least 60% alcohol. Use it when you don t have access to soap and water.    Clean items you use often with disinfectant wipes. This includes phones, computer keyboards, and toys.    Avoid crowds and children as much as possible while you are pregnant. Stay away from anyone who has the flu.  Tips for good handwashing  Handwashing is one of the best ways to prevent many common infections. Follow these steps for more effective handwashing:    Use warm water and plenty of soap. Work up a good lather.    Clean the whole hand, under your nails, between your fingers, and up the wrists.    Wash for at least 20 seconds. Don t just wipe--scrub well.    Rinse, letting the water run down your fingers, not up your wrists.    Dry your hands well. Use a paper towel to turn off the faucet and open the door.  Using alcohol-based hand gels  Alcohol-based hand gels are also a good choice for cleaning your hands. Use them when you don t have access to soap and water, or your hands don't look dirty. Follow these steps:    Squeeze about a tablespoon of gel into the palm of one hand.    Rub your hands together briskly, cleaning the backs of your hands, the palms, between your fingers, and up the wrists.    Rub until the gel is gone and your hands are completely dry.   Date Last Reviewed: 8/9/2015 2000-2017 [employer] [address] [place] [address], [place] [address]. All rights reserved. This information is not intended as a substitute for professional medical care. Always follow your healthcare professional's instructions.

## 2018-01-15 ENCOUNTER — ALLIED HEALTH/NURSE VISIT (OUTPATIENT)
Dept: FAMILY MEDICINE | Facility: CLINIC | Age: 33
End: 2018-01-15
Payer: COMMERCIAL

## 2018-01-15 VITALS — WEIGHT: 125.38 LBS | DIASTOLIC BLOOD PRESSURE: 68 MMHG | SYSTOLIC BLOOD PRESSURE: 98 MMHG | BODY MASS INDEX: 20.55 KG/M2

## 2018-01-15 DIAGNOSIS — N91.2 ABSENCE OF MENSTRUATION: Primary | ICD-10-CM

## 2018-01-15 LAB — HCG UR QL: POSITIVE

## 2018-01-15 PROCEDURE — 99207 ZZC NO CHARGE NURSE ONLY: CPT

## 2018-01-15 PROCEDURE — 81025 URINE PREGNANCY TEST: CPT | Performed by: OBSTETRICS & GYNECOLOGY

## 2018-01-15 RX ORDER — PRENATAL VIT/IRON FUM/FOLIC AC 27MG-0.8MG
1 TABLET ORAL DAILY
COMMUNITY
End: 2018-10-23

## 2018-01-15 NOTE — MR AVS SNAPSHOT
"              After Visit Summary   1/15/2018    Debbie Gage    MRN: [id number]           Patient Information     Date Of Birth          1985        Visit Information        Provider Department      1/15/2018 9:00 AM PH NURSE [facility]        Today's Diagnoses     Absence of menstruation    -  1       Follow-ups after your visit        Your next 10 appointments already scheduled     Jan 22, 2018 11:00 AM CST   New Prenatal with NL OB INTAKE   [facility]    [address] [place] [id number] [phone]              Who to contact     If you have questions or need follow up information about today's clinic visit or your schedule please contact [facility] directly at [phone].  Normal or non-critical lab and imaging results will be communicated to you by Budgehart, letter or phone within 4 business days after the clinic has received the results. If you do not hear from us within 7 days, please contact the clinic through Budgehart or phone. If you have a critical or abnormal lab result, we will notify you by phone as soon as possible.  Submit refill requests through OpenPeak or call your pharmacy and they will forward the refill request to us. Please allow 3 business days for your refill to be completed.          Additional Information About Your Visit        BudgeharJourneys Information     OpenPeak lets you send messages to your doctor, view your test results, renew your prescriptions, schedule appointments and more. To sign up, go to [email or web address] . Click on \"Log in\" on the left side of the screen, which will take you to the Welcome page. Then click on \"Sign up Now\" on the right side of the page.     You will be asked to enter the access code listed below, as well as some personal information. Please follow the directions to create your username and password.     Your access code is: [email or web address]  Expires: 2/11/2018  " 1:39 PM     Your access code will  in 90 days. If you need help or a new code, please call your [place] clinic or [phone].        Care EveryWhere ID     This is your Care EveryWhere ID. This could be used by other organizations to access your [place] medical records  [id number]        Your Vitals Were     Last Period BMI (Body Mass Index)                12/10/2017 (Exact Date) 20.55 kg/m2           Blood Pressure from Last 3 Encounters:   01/15/18 98/68   18 93/63   17 106/75    Weight from Last 3 Encounters:   01/15/18 125 lb 6 oz (56.9 kg)   18 120 lb (54.4 kg)   17 129 lb (58.5 kg)              We Performed the Following     HCG qualitative urine          Today's Medication Changes          These changes are accurate as of: 1/15/18 10:14 AM.  If you have any questions, ask your nurse or doctor.               Stop taking these medicines if you haven't already. Please contact your care team if you have questions.     HYDROcodone-acetaminophen 5-325 MG per tablet   Commonly known as:  NORCO                    Primary Care Provider Office Phone # Fax #    Shelli CHRISSY [facility] [phone]       [address] [place] [address]        Equal Access to Services     JOSE R MARTINEZ AH: Hadii aad ku hadasho Soomaali, waaxda luqadaha, qaybta kaalmada adeegyada, kevin gerber. So [facility] [phone].    ATENCIÓN: Si habla español, tiene a hernandez disposición servicios gratuitos de asistencia lingüística. Llame al [phone].    We comply with applicable federal civil rights laws and [place] laws. We do not discriminate on the basis of race, color, national origin, age, disability, sex, sexual orientation, or gender identity.            Thank you!     Thank you for choosing [facility]  for your care. Our goal is always to provide you with excellent care. Hearing back from our patients is one way we can continue to improve our  services. Please take a few minutes to complete the written survey that you may receive in the mail after your visit with us. Thank you!             Your Updated Medication List - Protect others around you: Learn how to safely use, store and throw away your medicines at www.disposemymeds.org.          This list is accurate as of: 1/15/18 10:14 AM.  Always use your most recent med list.                   Brand Name Dispense Instructions for use Diagnosis    azithromycin 250 MG tablet    ZITHROMAX Z-MARE    6 tablet    Two tablets on the first day, then one tablet daily for the next 4 days        oseltamivir 75 MG capsule    TAMIFLU    10 capsule    Take 1 capsule (75 mg) by mouth 2 times daily for 5 days        prenatal multivitamin plus iron 27-0.8 MG Tabs per tablet      Take 1 tablet by mouth daily        TYLENOL PO

## 2018-01-15 NOTE — NURSING NOTE
Debbie Gage is a 32 year old here today for a pregnancy test.  LMP: Patient's last menstrual period was 12/10/2017 (exact date).  Wt: 125 lbs 6 oz.    Symptoms include breast tenderness, absence of menses, nausea &/or vomiting and fatigue.    Debbie informed of positive pregnancy test results. HUBER: 18    Educational advice given: nutrition, smoking and drugs & alcohol.    Current medications reviewed: Yes    Previous pregnancy history remarkable for:  labor (6 weeks and 4 weeks).    Plan: schedule appointment with OB Educator and/or OB class, follow-up appointment with Dr. King for pre-jadon care, take multivitamin or pre-jadon vitamins and OB Education packet given.    She is to call back if she has any questions or concerns.  She is advised to notify a provider immediately if she experiences any severe cramping or abdominal pain or any vaginal bleeding.  Ida Farias, BSN, RN

## 2018-01-15 NOTE — PROGRESS NOTES
Yusra Please inform Debbie/ or caretaker  that this result(s) is/are positive- please set her up with michelle Dhillon and get US at 6 weeks- Thanks. SRUTHI King MD

## 2018-01-29 ENCOUNTER — TELEPHONE (OUTPATIENT)
Dept: FAMILY MEDICINE | Facility: CLINIC | Age: 33
End: 2018-01-29

## 2018-01-29 ENCOUNTER — PRENATAL OFFICE VISIT (OUTPATIENT)
Dept: FAMILY MEDICINE | Facility: CLINIC | Age: 33
End: 2018-01-29
Payer: COMMERCIAL

## 2018-01-29 VITALS — BODY MASS INDEX: 20.49 KG/M2 | WEIGHT: 123 LBS | HEIGHT: 65 IN

## 2018-01-29 DIAGNOSIS — Z32.01 PREGNANCY, CONFIRMED, NOT FIRST: Primary | ICD-10-CM

## 2018-01-29 LAB
ALBUMIN UR-MCNC: NEGATIVE MG/DL
AMORPH CRY #/AREA URNS HPF: ABNORMAL /HPF
APPEARANCE UR: ABNORMAL
BACTERIA #/AREA URNS HPF: ABNORMAL /HPF
BILIRUB UR QL STRIP: NEGATIVE
COLOR UR AUTO: YELLOW
ERYTHROCYTE [DISTWIDTH] IN BLOOD BY AUTOMATED COUNT: 13.4 % (ref 10–15)
GLUCOSE UR STRIP-MCNC: NEGATIVE MG/DL
HCT VFR BLD AUTO: 37.4 % (ref 35–47)
HGB BLD-MCNC: 12 G/DL (ref 11.7–15.7)
HGB UR QL STRIP: NEGATIVE
KETONES UR STRIP-MCNC: NEGATIVE MG/DL
LEUKOCYTE ESTERASE UR QL STRIP: NEGATIVE
MCH RBC QN AUTO: 29.5 PG (ref 26.5–33)
MCHC RBC AUTO-ENTMCNC: 32.1 G/DL (ref 31.5–36.5)
MCV RBC AUTO: 92 FL (ref 78–100)
MUCOUS THREADS #/AREA URNS LPF: PRESENT /LPF
NITRATE UR QL: NEGATIVE
PH UR STRIP: 8 PH (ref 5–7)
PLATELET # BLD AUTO: 331 10E9/L (ref 150–450)
RBC # BLD AUTO: 4.07 10E12/L (ref 3.8–5.2)
RBC #/AREA URNS AUTO: 1 /HPF (ref 0–2)
SOURCE: ABNORMAL
SP GR UR STRIP: 1.01 (ref 1–1.03)
SQUAMOUS #/AREA URNS AUTO: 1 /HPF (ref 0–1)
UROBILINOGEN UR STRIP-MCNC: 0 MG/DL (ref 0–2)
WBC # BLD AUTO: 5.8 10E9/L (ref 4–11)
WBC #/AREA URNS AUTO: 1 /HPF (ref 0–2)

## 2018-01-29 PROCEDURE — 99207 ZZC NO CHARGE NURSE ONLY: CPT

## 2018-01-29 PROCEDURE — 87389 HIV-1 AG W/HIV-1&-2 AB AG IA: CPT | Performed by: OBSTETRICS & GYNECOLOGY

## 2018-01-29 PROCEDURE — 36415 COLL VENOUS BLD VENIPUNCTURE: CPT | Performed by: OBSTETRICS & GYNECOLOGY

## 2018-01-29 PROCEDURE — 81001 URINALYSIS AUTO W/SCOPE: CPT | Performed by: OBSTETRICS & GYNECOLOGY

## 2018-01-29 PROCEDURE — 86780 TREPONEMA PALLIDUM: CPT | Performed by: OBSTETRICS & GYNECOLOGY

## 2018-01-29 PROCEDURE — 85027 COMPLETE CBC AUTOMATED: CPT | Performed by: OBSTETRICS & GYNECOLOGY

## 2018-01-29 PROCEDURE — 87340 HEPATITIS B SURFACE AG IA: CPT | Performed by: OBSTETRICS & GYNECOLOGY

## 2018-01-29 PROCEDURE — 86901 BLOOD TYPING SEROLOGIC RH(D): CPT | Performed by: OBSTETRICS & GYNECOLOGY

## 2018-01-29 PROCEDURE — 86900 BLOOD TYPING SEROLOGIC ABO: CPT | Performed by: OBSTETRICS & GYNECOLOGY

## 2018-01-29 PROCEDURE — 86762 RUBELLA ANTIBODY: CPT | Performed by: OBSTETRICS & GYNECOLOGY

## 2018-01-29 PROCEDURE — 86850 RBC ANTIBODY SCREEN: CPT | Performed by: OBSTETRICS & GYNECOLOGY

## 2018-01-29 NOTE — MR AVS SNAPSHOT
After Visit Summary   2018    Debbie Gage    MRN: [id number]           Patient Information     Date Of Birth          1985        Visit Information        Provider Department      2018 12:00 PM NL OB INTAKE [facility]        Today's Diagnoses     Pregnancy, confirmed, not first    -  1      Care Instructions       BIRTH AND 17-alpha hydroxyprogesterone caproate (17P) TREATMENT    What is  birth?      birth is the delivery of a baby before 37 weeks of gestation.  Approximately 1 in every 12 babies in [place] is born premature (that s approximately 6,000 babies every year)!     birth is often unexpected, but can happen for many reasons. There are some known risk factors, which include:   -pregnancy with twins or triplets   -being  race  -some problems with the uterus or cervix   -some medical problems like high blood pressure   -smoking, drinking, or using illegal drugs while pregnant   -infections like urinary tract infection, bacterial vaginosis and chlamydia while    pregnant  -depression, stress, and anxiety    But the biggest risk factor is having a previous  baby. In fact, women who have one  birth have a 30-50% chance of their next baby coming early too.     What are the risks to a baby that delivers prematurely?     birth is the number one cause of  death worldwide. This risk increases the earlier the baby is born.  Prematurity can also cause serious long term health problems for babies such as breathing problems, infections, bleeding into the brain, as well as long term developmental problems like cerebral palsy, learning impairments, hearing and vision problems.    How can I prevent  birth in my pregnancy?  Overall, the best thing to prevent  delivery is to stay healthy during your pregnancy, and follow up with your doctor for your regular visits and screening tests.  If you have  a history of  birth in one of your past pregnancies, you may benefit from weekly injections of 17P.     What is 17P?  The full name is 17-alpha hydroxyprogesterone caproate. This is a form of your body s natural  pregnancy hormone , progesterone. The brand name of this is Alma, and it is FDA approved for prevention of  birth.  This medication is given in once weekly injections from week 16-20 through the 36th week of pregnancy. Research shows that women taking 17P can reduce their risk of  birth from 50% to 36%. The treatment has also been shown to reduce the risk of complications after birth, such as bleeding in the brain and need for supplemental oxygen.    It is important to complete the treatment once you start, as the risk of  birth goes up if you stop the injections early.    How can I get started on the Mayview treatment?  First, you should talk with your doctor to find out if this is a good option for you.  It is safe for pregnant women and for the fetus, but if you have some medical problems like uncontrolled blood pressure, breast cancer, or liver disease you should talk about it with your doctor first.  Your clinic will work with you to help determine insurance coverage and preauthorization if needed.  Then you will schedule weekly visits for 17P injections in addition to your routine prenatal visits with your doctor.    Side Effects of Mekena  The most common side effects  reported by Mayview users are   injection site reactions (pain [35%], swelling  [17%], pruritus (itching) [6%], nodule [5%]), urticaria (rash) (12%), pruritus (generalized itching (8%), nausea (6%), and diarrhea (2%).           Follow-ups after your visit        Your next 10 appointments already scheduled     2018  3:15 PM CST   US OB < 14 WEEKS SINGLE with PHUS1   [facility] ([facility])    [address] [id number] [phone]            Please bring a list of your medicines (including vitamins, minerals and over-the-counter drugs). Also, tell your doctor about any allergies you may have. Wear comfortable clothes and leave your valuables at home.  If you re less than 20 weeks drink four 8-ounce glasses of fluid an hour before your exam. If you need to empty your bladder before your exam, try to release only a little urine. Then, drink another glass of fluid.  You may have up to two family members in the exam room. If you bring a small child, an adult must be there to care for him or her.  Please call the Imaging Department at your exam site with any questions.            Feb 26, 2018  2:20 PM CST   New Prenatal with Ranjith King MD   [facility]    [address] [place] [id number] [phone]              Future tests that were ordered for you today     Open Future Orders        Priority Expected Expires Ordered    US OB < 14 Weeks Single Routine 1/29/2018 1/29/2019 1/29/2018            Who to contact     If you have questions or need follow up information about today's clinic visit or your schedule please contact [facility] directly at [phone].  Normal or non-critical lab and imaging results will be communicated to you by MyChart, letter or phone within 4 business days after the clinic has received the results. If you do not hear from us within 7 days, please contact the clinic through Northwest Biotherapeuticshart or phone. If you have a critical or abnormal lab result, we will notify you by phone as soon as possible.  Submit refill requests through Essensium or call your pharmacy and they will forward the refill request to us. Please allow 3 business days for your refill to be completed.          Additional Information About Your Visit        Northwest BiotherapeuticsharDaixe Information     Essensium lets you send messages to your doctor, view your test results, renew your prescriptions, schedule  "appointments and more. To sign up, go to [email or web address] . Click on \"Log in\" on the left side of the screen, which will take you to the Welcome page. Then click on \"Sign up Now\" on the right side of the page.     You will be asked to enter the access code listed below, as well as some personal information. Please follow the directions to create your username and password.     Your access code is: [email or web address]  Expires: 2018  1:39 PM     Your access code will  in 90 days. If you need help or a new code, please call your [place] clinic or [phone].        Care EveryWhere ID     This is your Care EveryWhere ID. This could be used by other organizations to access your [place] medical records  [id number]        Your Vitals Were     Height Last Period BMI (Body Mass Index)             5' 5\" (1.651 m) 12/10/2017 (Exact Date) 20.47 kg/m2          Blood Pressure from Last 3 Encounters:   01/15/18 98/68   18 93/63   17 106/75    Weight from Last 3 Encounters:   18 123 lb (55.8 kg)   01/15/18 125 lb 6 oz (56.9 kg)   18 120 lb (54.4 kg)              We Performed the Following     *UA reflex to Microscopic and Culture ([place]; [facility]; [facility]; [facility]; [facility]; [facility]; [place]; [place])     ABO/Rh type and screen     Anti treponema EIA     CBC WITH PLATELETS     HEPATITIS B SURFACE ANTIGEN     HIV Antigen Antibody Combo     Rubella Antibody IgG Quantitative        Primary Care Provider Office Phone # Fax #    CHRISSY Smith [facility] [phone]        [address] [place] [address]        Equal Access to Services     JOSE R MARTINEZ : Yossi Mir, osvaldo magdaleno, cristina kakevin maravilla. [facility] [phone].    ATENCIÓN: Si habla español, tiene a hernandez disposición servicios gratuitos de asistencia lingüística. Llame al [phone].    We comply with " applicable federal civil rights laws and [place] laws. We do not discriminate on the basis of race, color, national origin, age, disability, sex, sexual orientation, or gender identity.            Thank you!     Thank you for choosing [facility]  for your care. Our goal is always to provide you with excellent care. Hearing back from our patients is one way we can continue to improve our services. Please take a few minutes to complete the written survey that you may receive in the mail after your visit with us. Thank you!             Your Updated Medication List - Protect others around you: Learn how to safely use, store and throw away your medicines at www.disposemymeds.org.          This list is accurate as of 1/29/18 12:38 PM.  Always use your most recent med list.                   Brand Name Dispense Instructions for use Diagnosis    prenatal multivitamin plus iron 27-0.8 MG Tabs per tablet      Take 1 tablet by mouth daily        TYLENOL PO

## 2018-01-29 NOTE — PROGRESS NOTES
1. Have you had chicken pox?   Yes    Genetic Screening    Has the patient, baby's father, or anyone in either family had:     1. Thalassemia and and MCV result less than 80?No   2.  Neural tube defect? No   3.  Congenital heart defect?No   4. Down's syndrome?No   5.  Barry-Sachs disease?No   6. Sickle Cell disease or trait?No   7. Hemophilia or other inherited problems of blood?No   8. Muscular dystropy?No   9.  Cystic fibrosis?No  10. Jason's Chorea?No  11. Mental Retardation or autism?  No         If yes, was the person tested for Fragile X?   12. Any other inherited genetic or chromosomal disorders?No  13. Metabolic disorders such as diabetes or PKU?No  14. A child born with defects not listed above?No  15. Recurrent pregnancy loss or stillbirth?No  16.  Has the patient had any medications/street drugs/alcohol since her last menstrual period?No  18.  Does the patient or baby's father have any other genetic risks. No         Evaluation for 17P   Is this current pregnancy a little pregnancy? Unknown  Has this patient experienced a spontaneous,  birth or  rupture of membranes between 20 - 37 weeks of a little pregnancy? Yes

## 2018-01-29 NOTE — TELEPHONE ENCOUNTER
----- Message from Ranjith King MD sent at 1/29/2018  1:18 PM CST -----  Yusra Please inform Debbie/ or caretaker  that this result(s) is/are normal. Please set her up to see me in early February- she has appt for later that month but I would like to make it earlier-unless it conflicts with her schedule- Thanks. SRUTHI King MD

## 2018-01-29 NOTE — PROGRESS NOTES
Yusra Please inform Debbie/ or caretaker  that this result(s) is/are normal. Please set her up to see me in early February- she has appt for later that month but I would like to make it earlier-unless it conflicts with her schedule- Thanks. SRUTHI King MD

## 2018-01-29 NOTE — PATIENT INSTRUCTIONS
BIRTH AND 17-alpha hydroxyprogesterone caproate (17P) TREATMENT    What is  birth?      birth is the delivery of a baby before 37 weeks of gestation.  Approximately 1 in every 12 babies in [place] is born premature (that s approximately 6,000 babies every year)!     birth is often unexpected, but can happen for many reasons. There are some known risk factors, which include:   -pregnancy with twins or triplets   -being  race  -some problems with the uterus or cervix   -some medical problems like high blood pressure   -smoking, drinking, or using illegal drugs while pregnant   -infections like urinary tract infection, bacterial vaginosis and chlamydia while    pregnant  -depression, stress, and anxiety    But the biggest risk factor is having a previous  baby. In fact, women who have one  birth have a 30-50% chance of their next baby coming early too.     What are the risks to a baby that delivers prematurely?     birth is the number one cause of  death worldwide. This risk increases the earlier the baby is born.  Prematurity can also cause serious long term health problems for babies such as breathing problems, infections, bleeding into the brain, as well as long term developmental problems like cerebral palsy, learning impairments, hearing and vision problems.    How can I prevent  birth in my pregnancy?  Overall, the best thing to prevent  delivery is to stay healthy during your pregnancy, and follow up with your doctor for your regular visits and screening tests.  If you have a history of  birth in one of your past pregnancies, you may benefit from weekly injections of 17P.     What is 17P?  The full name is 17-alpha hydroxyprogesterone caproate. This is a form of your body s natural  pregnancy hormone , progesterone. The brand name of this is Alma, and it is FDA approved for prevention of  birth.  This medication  is given in once weekly injections from week 16-20 through the 36th week of pregnancy. Research shows that women taking 17P can reduce their risk of  birth from 50% to 36%. The treatment has also been shown to reduce the risk of complications after birth, such as bleeding in the brain and need for supplemental oxygen.    It is important to complete the treatment once you start, as the risk of  birth goes up if you stop the injections early.    How can I get started on the Alma treatment?  First, you should talk with your doctor to find out if this is a good option for you.  It is safe for pregnant women and for the fetus, but if you have some medical problems like uncontrolled blood pressure, breast cancer, or liver disease you should talk about it with your doctor first.  Your clinic will work with you to help determine insurance coverage and preauthorization if needed.  Then you will schedule weekly visits for 17P injections in addition to your routine prenatal visits with your doctor.    Side Effects of [place]  The most common side effects  reported by [place] users are   injection site reactions (pain [35%], swelling  [17%], pruritus (itching) [6%], nodule [5%]), urticaria (rash) (12%), pruritus (generalized itching (8%), nausea (6%), and diarrhea (2%).

## 2018-01-29 NOTE — TELEPHONE ENCOUNTER
Unable to LM for patient, appt scheduled for earlier to follow her first OB US (02/05/2018). Patient can complete US and then be seen right after with SHERIDAN Morejon CMA

## 2018-01-30 LAB
HBV SURFACE AG SERPL QL IA: NONREACTIVE
HIV 1+2 AB+HIV1 P24 AG SERPL QL IA: NONREACTIVE
RUBV IGG SERPL IA-ACNC: 46 IU/ML
T PALLIDUM IGG+IGM SER QL: NEGATIVE

## 2018-01-30 NOTE — TELEPHONE ENCOUNTER
Patient called back and I gave her the message and she understands and has no questions at this time.       Thank you,  Jacqueline Soriano   for [facility]

## 2018-02-01 LAB
ABO + RH BLD: NORMAL
ABO + RH BLD: NORMAL
BLD GP AB SCN SERPL QL: NORMAL
BLOOD BANK CMNT PATIENT-IMP: NORMAL
SPECIMEN EXP DATE BLD: NORMAL

## 2018-02-05 ENCOUNTER — HOSPITAL ENCOUNTER (OUTPATIENT)
Dept: ULTRASOUND IMAGING | Facility: CLINIC | Age: 33
Discharge: HOME | End: 2018-02-05
Attending: OBSTETRICS & GYNECOLOGY | Admitting: OBSTETRICS & GYNECOLOGY
Payer: COMMERCIAL

## 2018-02-05 ENCOUNTER — PRENATAL OFFICE VISIT (OUTPATIENT)
Dept: FAMILY MEDICINE | Facility: CLINIC | Age: 33
End: 2018-02-05
Payer: COMMERCIAL

## 2018-02-05 VITALS
HEIGHT: 65 IN | TEMPERATURE: 98.1 F | DIASTOLIC BLOOD PRESSURE: 64 MMHG | WEIGHT: 125 LBS | HEART RATE: 76 BPM | SYSTOLIC BLOOD PRESSURE: 90 MMHG | RESPIRATION RATE: 16 BRPM | OXYGEN SATURATION: 100 % | BODY MASS INDEX: 20.83 KG/M2

## 2018-02-05 DIAGNOSIS — Z34.91 ENCOUNTER FOR PREGNANCY RELATED EXAMINATION IN FIRST TRIMESTER: Primary | ICD-10-CM

## 2018-02-05 DIAGNOSIS — Z32.01 PREGNANCY, CONFIRMED, NOT FIRST: ICD-10-CM

## 2018-02-05 PROCEDURE — 76801 OB US < 14 WKS SINGLE FETUS: CPT

## 2018-02-05 PROCEDURE — 99207 ZZC FIRST OB VISIT: CPT | Performed by: OBSTETRICS & GYNECOLOGY

## 2018-02-05 ASSESSMENT — PAIN SCALES - GENERAL: PAINLEVEL: NO PAIN (0)

## 2018-02-05 NOTE — NURSING NOTE
"Chief Complaint   Patient presents with     Prenatal Care     8w1d       Initial BP 90/64  Pulse 76  Temp 98.1  F (36.7  C) (Temporal)  Resp 16  Ht 5' 5\" (1.651 m)  Wt 125 lb (56.7 kg)  LMP 12/10/2017 (Exact Date)  SpO2 100%  Breastfeeding? No  BMI 20.8 kg/m2 Estimated body mass index is 20.8 kg/(m^2) as calculated from the following:    Height as of this encounter: 5' 5\" (1.651 m).    Weight as of this encounter: 125 lb (56.7 kg)..   BP completed using cuff size: regular  Medication Rec Completed    Yusra Morejon CMA    "

## 2018-02-05 NOTE — MR AVS SNAPSHOT
"              After Visit Summary   2/5/2018    Debbie Gage    MRN: [id number]           Patient Information     Date Of Birth          1985        Visit Information        Provider Department      2/5/2018 4:20 PM Ranjith King MD [facility]         Follow-ups after your visit        Your next 10 appointments already scheduled     Feb 05, 2018  4:20 PM CST   ESTABLISHED PRENATAL with Ranjith King MD   [facility] ([facility])    [address] [place] [address] [id number] [phone]            Feb 26, 2018  2:20 PM CST   New Prenatal with Ranjith King MD   [facility] ([facility])    [address] [place] [address] [id number] [phone]              Who to contact     If you have questions or need follow up information about today's clinic visit or your schedule please contact [facility] directly at [phone].  Normal or non-critical lab and imaging results will be communicated to you by Loop Trolleyhart, letter or phone within 4 business days after the clinic has received the results. If you do not hear from us within 7 days, please contact the clinic through [employer] or phone. If you have a critical or abnormal lab result, we will notify you by phone as soon as possible.  Submit refill requests through [employer] or call your pharmacy and they will forward the refill request to us. Please allow 3 business days for your refill to be completed.          Additional Information About Your Visit        [employer] Information     [employer] lets you send messages to your doctor, view your test results, renew your prescriptions, schedule appointments and more. To sign up, go to [email or web address] . Click on \"Log in\" on the left side of the screen, which will take you to the Welcome page. Then click on \"Sign up Now\" on the right side of the page.     You will be asked to enter the " "access code listed below, as well as some personal information. Please follow the directions to create your username and password.     Your access code is: [email or web address]  Expires: 2018  1:39 PM     Your access code will  in 90 days. If you need help or a new code, please call your [place] clinic or [phone].        Care EveryWhere ID     This is your Care EveryWhere ID. This could be used by other organizations to access your [place] medical records  [id number]        Your Vitals Were     Pulse Temperature Respirations Height Last Period Pulse Oximetry    76 98.1  F (36.7  C) (Temporal) 16 5' 5\" (1.651 m) 12/10/2017 (Exact Date) 100%    Breastfeeding? BMI (Body Mass Index)                No 20.8 kg/m2           Blood Pressure from Last 3 Encounters:   18 90/64   01/15/18 98/68   18 93/63    Weight from Last 3 Encounters:   18 125 lb (56.7 kg)   18 123 lb (55.8 kg)   01/15/18 125 lb 6 oz (56.9 kg)              Today, you had the following     No orders found for display       Primary Care Provider Office Phone # Fax #    CHRISSY Smith [facility] [phone] [address] [place] [address]        Equal Access to Services     JOSE R MARTINEZ : Hadii adrian estevezo Sopiero, waaxda luqadaha, qaybta kaalmada hollieyaniurka, ekvin persaud . So [facility] [phone].    ATENCIÓN: Si habla español, tiene a hernandez disposición servicios gratuitos de asistencia lingüística. Llame al [phone].    We comply with applicable federal civil rights laws and [place] laws. We do not discriminate on the basis of race, color, national origin, age, disability, sex, sexual orientation, or gender identity.            Thank you!     Thank you for choosing [facility]  for your care. Our goal is always to provide you with excellent care. Hearing back from our patients is one way we can continue to improve our services. Please take a few " minutes to complete the written survey that you may receive in the mail after your visit with us. Thank you!             Your Updated Medication List - Protect others around you: Learn how to safely use, store and throw away your medicines at www.disposemymeds.org.          This list is accurate as of 2/5/18  4:07 PM.  Always use your most recent med list.                   Brand Name Dispense Instructions for use Diagnosis    prenatal multivitamin plus iron 27-0.8 MG Tabs per tablet      Take 1 tablet by mouth daily        TYLENOL PO

## 2018-02-05 NOTE — PROGRESS NOTES
Subjective: Debbie is here for 1st OB visit. She is  32 years old, G 3 P . EDC is 18 based on u/s at 7 weeks and 5 days.         Past OB history consists of 2 CS- 1st one at 34 weeks- she was in labor-   Second at 36 weeks- again in labor      Complications with previous deliveries: both C sections went well    Social history:  Social History   Substance Use Topics     Smoking status: Never Smoker     Smokeless tobacco: Current User     Alcohol use 0.0 oz/week     0 Standard drinks or equivalent per week      Comment: occas      History   Smoking Status     Never Smoker   Smokeless Tobacco     Current User   :    has a past medical history of Endometriosis and S/P LEEP (loop electrosurgical excision procedure) (). She also has no past medical history of Malignant hyperthermia; Motion sickness; or PONV (postoperative nausea and vomiting).    Current Outpatient Prescriptions   Medication Sig Dispense Refill     Prenatal Vit-Fe Fumarate-FA (PRENATAL MULTIVITAMIN PLUS IRON) 27-0.8 MG TABS per tablet Take 1 tablet by mouth daily       Acetaminophen (TYLENOL PO)         Allergies   Allergen Reactions     Sulfa Drugs Hives        Past Surgical History:   Procedure Laterality Date     AS ENLARGE BREAST WITH IMPLANT       silicone implants      SECTION      X2     CYSTECTOMY OVARIAN BENIGN       DILATION AND CURETTAGE, HYSTEROSCOPY, LAPAROSCOPY, COMBINED N/A 3/6/2017    Procedure: COMBINED DILATION AND CURETTAGE, HYSTEROSCOPY, LAPAROSCOPY;  Surgeon: Ranjith King MD;  Location: [facility] OR     ENT SURGERY      wisdom teeth extraction     GYN SURGERY      laparoscopy x 4     LAPAROSCOPIC ABLATION ENDOMETRIOSIS  3/6/2017    Procedure: LAPAROSCOPIC ABLATION ENDOMETRIOSIS;  Surgeon: Ranjith King MD;  Location: [facility] OR     LAPAROSCOPIC LYSIS ADHESIONS N/A 3/6/2017    Procedure: LAPAROSCOPIC LYSIS ADHESIONS;  Surgeon: Ranjith King MD;  Location: [facility] OR     LEEP TX, CERVICAL   "2010     ORTHOPEDIC SURGERY      ankle edwin surgery (tendon repair)        Family History   Problem Relation Age of Onset     Other Cancer Maternal Grandmother      ovarian     DIABETES Maternal Grandmother      DIABETES Maternal Grandfather      Colon Cancer Paternal Grandfather      Seasonal/Environmental Allergies Son      Allergy (Severe) Brother      dairy and shellfish     Asthma Brother       Review Of Systems  Skin: negative  Eyes: negative  Ears/Nose/Throat: negative  Respiratory: No shortness of breath, dyspnea on exertion, cough, or hemoptysis  Cardiovascular: negative  Gastrointestinal: negative  Genitourinary: negative  Musculoskeletal: negative  Neurologic: negative  Psychiatric: negative  Hematologic/Lymphatic/Immunologic: negative  Endocrine: negative      Physical Exam:Vital signs are stable. Blood pressure 90/64, pulse 76, temperature 98.1  F (36.7  C), temperature source Temporal, resp. rate 16, height 5' 5\" (1.651 m), weight 125 lb (56.7 kg), last menstrual period 12/10/2017, SpO2 100 %, not currently breastfeeding.      HEENT:    Sclerae and conjunctiva are normal.   Ear canals and TMs look normal.  Nasal mucosa is pink  - no polyps or masses seen.  sinuses are non tender to palpation.  Throat is unremarkable . Mucous membranes are moist.   Neck is supple, mobile, no adenopathy or masses palpable. The thyroid feels normal.   Normal range of motion noted.  Chest is clear to auscultation.  No wheezes, rales or rhonchi heard.  Cardiac exam is normal with s1, s2, no murmurs or adventitious sounds.Normal rate and rhythm is heard.   breast exam: declined today due to discomfort in pregnancy.   she will examine them when she is able and report any concerns to me as needed.    Abdomen is soft,  nondistended, No masses felt.No HSM. No guarding or rigidity or rebound   noted. Palpation reveals  no    tenderness   Normal bowel sounds heard.   Pelvic exam: deferred- she had a recent pap and pelvic exam in " [place]- per pt.  Assessment:  /Plan:   1. 32year old G 3 P  with EDC of              2. Previous  sections ×2. She is not a candidate for . She will plan another  section at 39 weeks. She hasn't decided about permanent sterilization but she is fairly sure this is her last pregnancy and so she will likely request a tubal ligation.    3. She has a history of  birth at 34 weeks. I did offer referral to the [facility] maternal fetal medicine for consultation about possible interventions such as cervical length screening or 17 hydroxyprogesterone caproate treatment but she declined for now. We arrived at a compromise where she would like to be seen weekly after 28 weeks and if we have any hint that she may be going into labor earlier than anticipated we will do fetal fibronectin testing or other checking.      Options for  testing were discussed. These generally include chorionic villus sampling,quad screen testing,nuchal lucency/blood marker screening, amniocentesis, Verifi testing and level 2 US.  She declines these tests.    Quad screen discussed and pt has decided  AGAINST   It.   Continue prenatal vitamins.Precautions discussed.  Follow up in  3 weeks, sooner if concerns arise such as cramping, bleeding or nausea/vomiting.  Electronically signed by Ranjith King MD

## 2018-02-26 ENCOUNTER — TELEPHONE (OUTPATIENT)
Dept: OBGYN | Facility: CLINIC | Age: 33
End: 2018-02-26

## 2018-02-26 ENCOUNTER — PRENATAL OFFICE VISIT (OUTPATIENT)
Dept: FAMILY MEDICINE | Facility: CLINIC | Age: 33
End: 2018-02-26
Payer: COMMERCIAL

## 2018-02-26 VITALS
OXYGEN SATURATION: 98 % | HEIGHT: 65 IN | DIASTOLIC BLOOD PRESSURE: 54 MMHG | WEIGHT: 123 LBS | HEART RATE: 82 BPM | SYSTOLIC BLOOD PRESSURE: 98 MMHG | RESPIRATION RATE: 16 BRPM | TEMPERATURE: 97.7 F | BODY MASS INDEX: 20.49 KG/M2

## 2018-02-26 DIAGNOSIS — Z34.91 ENCOUNTER FOR PREGNANCY RELATED EXAMINATION IN FIRST TRIMESTER: Primary | ICD-10-CM

## 2018-02-26 LAB
ANION GAP SERPL CALCULATED.3IONS-SCNC: 7 MMOL/L (ref 3–14)
BUN SERPL-MCNC: 7 MG/DL (ref 7–30)
CALCIUM SERPL-MCNC: 9.2 MG/DL (ref 8.5–10.1)
CHLORIDE SERPL-SCNC: 102 MMOL/L (ref 94–109)
CO2 SERPL-SCNC: 27 MMOL/L (ref 20–32)
CREAT SERPL-MCNC: 0.51 MG/DL (ref 0.52–1.04)
GFR SERPL CREATININE-BSD FRML MDRD: >90 ML/MIN/1.7M2
GLUCOSE SERPL-MCNC: 89 MG/DL (ref 70–99)
POTASSIUM SERPL-SCNC: 3.7 MMOL/L (ref 3.4–5.3)
SODIUM SERPL-SCNC: 136 MMOL/L (ref 133–144)

## 2018-02-26 PROCEDURE — 99207 ZZC PRENATAL VISIT: CPT | Performed by: OBSTETRICS & GYNECOLOGY

## 2018-02-26 PROCEDURE — 36415 COLL VENOUS BLD VENIPUNCTURE: CPT | Performed by: OBSTETRICS & GYNECOLOGY

## 2018-02-26 PROCEDURE — 80048 BASIC METABOLIC PNL TOTAL CA: CPT | Performed by: OBSTETRICS & GYNECOLOGY

## 2018-02-26 RX ORDER — ONDANSETRON 4 MG/1
4 TABLET, FILM COATED ORAL EVERY 8 HOURS PRN
Qty: 30 TABLET | Refills: 1 | Status: SHIPPED | OUTPATIENT
Start: 2018-02-26 | End: 2018-06-18

## 2018-02-26 ASSESSMENT — PAIN SCALES - GENERAL: PAINLEVEL: NO PAIN (0)

## 2018-02-26 NOTE — NURSING NOTE
"Chief Complaint   Patient presents with     Prenatal Care     11w1d       Initial BP 98/54  Pulse 82  Temp 97.7  F (36.5  C) (Temporal)  Resp 16  Ht 5' 5\" (1.651 m)  Wt 123 lb (55.8 kg)  LMP 12/10/2017 (Exact Date)  SpO2 98%  BMI 20.47 kg/m2 Estimated body mass index is 20.47 kg/(m^2) as calculated from the following:    Height as of this encounter: 5' 5\" (1.651 m).    Weight as of this encounter: 123 lb (55.8 kg)..   BP completed using cuff size: regular  Medication Rec Completed    Yusra Morejon CMA    "

## 2018-02-26 NOTE — MR AVS SNAPSHOT
"              After Visit Summary   2/26/2018    Debbie Gage    MRN: [id number]           Patient Information     Date Of Birth          1985        Visit Information        Provider Department      2/26/2018 2:20 PM Ranjith King MD [facility]        Today's Diagnoses     Encounter for pregnancy related examination in first trimester    -  1       Follow-ups after your visit        Your next 10 appointments already scheduled     Mar 05, 2018  3:00 PM CST   ESTABLISHED PRENATAL with Ranjith King MD   [facility] ([facility])    [address] [place] [address] [phone]              Who to contact     If you have questions or need follow up information about today's clinic visit or your schedule please contact [facility] directly at [phone].  Normal or non-critical lab and imaging results will be communicated to you by Boxeehart, letter or phone within 4 business days after the clinic has received the results. If you do not hear from us within 7 days, please contact the clinic through MyChart or phone. If you have a critical or abnormal lab result, we will notify you by phone as soon as possible.  Submit refill requests through [employer] or call your pharmacy and they will forward the refill request to us. Please allow 3 business days for your refill to be completed.          Additional Information About Your Visit        MyChart Information     [employer] lets you send messages to your doctor, view your test results, renew your prescriptions, schedule appointments and more. To sign up, go to [email or web address] . Click on \"Log in\" on the left side of the screen, which will take you to the Welcome page. Then click on \"Sign up Now\" on the right side of the page.     You will be asked to enter the access code listed below, as well as some personal information. Please follow the directions to create " "your username and password.     Your access code is: [email or web address]  Expires: 2018  2:27 PM     Your access code will  in 90 days. If you need help or a new code, please call your [place] clinic or [phone].        Care EveryWhere ID     This is your Care EveryWhere ID. This could be used by other organizations to access your [place] medical records  [id number]        Your Vitals Were     Pulse Temperature Respirations Height Last Period Pulse Oximetry    82 97.7  F (36.5  C) (Temporal) 16 5' 5\" (1.651 m) 12/10/2017 (Exact Date) 98%    BMI (Body Mass Index)                   20.47 kg/m2            Blood Pressure from Last 3 Encounters:   18 98/54   18 90/64   01/15/18 98/68    Weight from Last 3 Encounters:   18 123 lb (55.8 kg)   18 125 lb (56.7 kg)   18 123 lb (55.8 kg)              We Performed the Following     Basic metabolic panel          Today's Medication Changes          These changes are accurate as of 18  2:27 PM.  If you have any questions, ask your nurse or doctor.               Start taking these medicines.        Dose/Directions    ondansetron 4 MG tablet   Commonly known as:  ZOFRAN   Used for:  Encounter for pregnancy related examination in first trimester   Started by:  Ranjith King MD        Dose:  4 mg   Take 1 tablet (4 mg) by mouth every 8 hours as needed for nausea   Quantity:  30 tablet   Refills:  1            Where to get your medicines      These medications [facility] [place] [id number]     Phone:  [phone]     ondansetron 4 MG tablet                Primary Care Provider Office Phone # Fax #    CHRISSY Smith [phone]       [address] [place] [address]        Equal Access to Services     JOSE R MARTINEZ AH: Hadii aad ku hadasho Soomaali, waaxda luqadaha, qaybta kaalbrittany jaramillo, kevin browne " tomy persaud ah. So [facility] [phone].    ATENCIÓN: Si habla hari, tiene a hernandez disposición servicios gratuitos de asistencia lingüística. Hilda al [phone].    We comply with applicable federal civil rights laws and [place] laws. We do not discriminate on the basis of race, color, national origin, age, disability, sex, sexual orientation, or gender identity.            Thank you!     Thank you for choosing [facility]  for your care. Our goal is always to provide you with excellent care. Hearing back from our patients is one way we can continue to improve our services. Please take a few minutes to complete the written survey that you may receive in the mail after your visit with us. Thank you!             Your Updated Medication List - Protect others around you: Learn how to safely use, store and throw away your medicines at www.disposemymeds.org.          This list is accurate as of 2/26/18  2:27 PM.  Always use your most recent med list.                   Brand Name Dispense Instructions for use Diagnosis    ondansetron 4 MG tablet    ZOFRAN    30 tablet    Take 1 tablet (4 mg) by mouth every 8 hours as needed for nausea    Encounter for pregnancy related examination in first trimester       prenatal multivitamin plus iron 27-0.8 MG Tabs per tablet      Take 1 tablet by mouth daily        TYLENOL PO

## 2018-02-26 NOTE — PROGRESS NOTES
She is nauseated. She hasn't taken any pills for this. Has thrown up a few times. Nonbloody emesis.Prenatal flowsheet info reviewed as listed above.  Chest is clear to auscultation.  No wheezes, rales or rhonchi heard.  Cardiac exam is normal with s1, s2, no murmurs or adventitious sounds.Normal rate and rhythm is heard.   Abdomen is soft,  nondistended, No masses felt.No HSM. No guarding or rigidity or rebound   noted. Palpation reveals  no    tenderness   Normal bowel sounds heard.   She appears well-hydrated at this point. I will check electrolytes today. See Rx for Zofran. Risks discussed but at this point benefit outweighs risks. Recheck with me in 1 week and sooner if the vomiting becomes more frequent.SRUTHI King MD

## 2018-02-26 NOTE — PROGRESS NOTES
Yusra Please inform Debbie/ or caretaker  that this result(s) is/are normal.  Thanks. SRUTHI King MD

## 2018-02-26 NOTE — TELEPHONE ENCOUNTER
----- Message from Ranjith King MD sent at 2/26/2018  2:55 PM CST -----  Yusra Please inform Debbie/ or caretaker  that this result(s) is/are normal.  Thanks. SRUTHI King MD

## 2018-02-26 NOTE — TELEPHONE ENCOUNTER
Patient called back and I gave her the message about her results. She understood and had no other questions or concerns at this time.     Thank you,  Jacqueline Soriano   for [facility]

## 2018-02-26 NOTE — TELEPHONE ENCOUNTER
Left message for patient to return call to clinic.  Normal BMP/blood work from today's office visit  Yusra Morejon CMA

## 2018-03-16 ENCOUNTER — TELEPHONE (OUTPATIENT)
Dept: FAMILY MEDICINE | Facility: CLINIC | Age: 33
End: 2018-03-16

## 2018-03-16 DIAGNOSIS — Z20.828 EXPOSURE TO INFLUENZA: Primary | ICD-10-CM

## 2018-03-16 RX ORDER — OSELTAMIVIR PHOSPHATE 75 MG/1
75 CAPSULE ORAL DAILY
Qty: 10 CAPSULE | Refills: 0 | Status: SHIPPED | OUTPATIENT
Start: 2018-03-16 | End: 2018-04-09

## 2018-03-16 NOTE — TELEPHONE ENCOUNTER
Will route to covering provider to address. Tiffani Torrez CMA ([facility])

## 2018-03-16 NOTE — TELEPHONE ENCOUNTER
She absolutely should be treated prophylactically.  Will have staff notify patient.  I need to know pharmacy.      Derrick Rosas MD

## 2018-03-16 NOTE — TELEPHONE ENCOUNTER
Reason for Call:  Other     Detailed comments: Debbie calls this afternoon to state that her 5yr old son was diagnosed today with Influenza B and she is 13 week pregnant.  Debbie is wondering if she should treated as a precaution.  Please call and advise.    Phone Number Patient can be reached at: Home number on file [phone] (home)    Best Time: any    Can we leave a detailed message on this number? YES    Call taken on 3/16/2018 at 4:14 PM by Suzanne Almeida

## 2018-03-19 ENCOUNTER — PRENATAL OFFICE VISIT (OUTPATIENT)
Dept: FAMILY MEDICINE | Facility: CLINIC | Age: 33
End: 2018-03-19
Payer: COMMERCIAL

## 2018-03-19 VITALS
TEMPERATURE: 97.3 F | SYSTOLIC BLOOD PRESSURE: 100 MMHG | RESPIRATION RATE: 16 BRPM | BODY MASS INDEX: 20.66 KG/M2 | DIASTOLIC BLOOD PRESSURE: 62 MMHG | WEIGHT: 124 LBS | OXYGEN SATURATION: 100 % | HEIGHT: 65 IN | HEART RATE: 64 BPM

## 2018-03-19 DIAGNOSIS — Z34.92 ENCOUNTER FOR PREGNANCY RELATED EXAMINATION IN SECOND TRIMESTER: Primary | ICD-10-CM

## 2018-03-19 PROCEDURE — 99207 ZZC PRENATAL VISIT: CPT | Performed by: OBSTETRICS & GYNECOLOGY

## 2018-03-19 ASSESSMENT — PAIN SCALES - GENERAL: PAINLEVEL: NO PAIN (0)

## 2018-03-19 NOTE — NURSING NOTE
"Chief Complaint   Patient presents with     Prenatal Care     14w1d       Initial /62  Pulse 64  Temp 97.3  F (36.3  C) (Temporal)  Resp 16  Ht 5' 5\" (1.651 m)  Wt 124 lb (56.2 kg)  LMP 12/10/2017 (Exact Date)  SpO2 100%  Breastfeeding? No  BMI 20.63 kg/m2 Estimated body mass index is 20.63 kg/(m^2) as calculated from the following:    Height as of this encounter: 5' 5\" (1.651 m).    Weight as of this encounter: 124 lb (56.2 kg)..   BP completed using cuff size: regular  Medication Rec Completed    Yusra Morejon CMA    "

## 2018-03-19 NOTE — MR AVS SNAPSHOT
"              After Visit Summary   3/19/2018    Debbie Gage    MRN: [id number]           Patient Information     Date Of Birth          1985        Visit Information        Provider Department      3/19/2018 11:00 AM Ranjith King MD [facility]        Today's Diagnoses     Encounter for pregnancy related examination in second trimester    -  1       Follow-ups after your visit        Your next 10 appointments already scheduled     Apr 09, 2018  2:40 PM CDT   ESTABLISHED PRENATAL with Ranjith King MD   [facility]    [address] [place] [address] [id number] [phone]              Who to contact     If you have questions or need follow up information about today's clinic visit or your schedule please contact [facility] directly at [phone].  Normal or non-critical lab and imaging results will be communicated to you by Launch[employer], letter or phone within 4 business days after the clinic has received the results. If you do not hear from us within 7 days, please contact the clinic through [employer] or phone. If you have a critical or abnormal lab result, we will notify you by phone as soon as possible.  Submit refill requests through [employer] or call your pharmacy and they will forward the refill request to us. Please allow 3 business days for your refill to be completed.          Additional Information About Your Visit        MyChart Information     [employer] lets you send messages to your doctor, view your test results, renew your prescriptions, schedule appointments and more. To sign up, go to [email or web address] . Click on \"Log in\" on the left side of the screen, which will take you to the Welcome page. Then click on \"Sign up Now\" on the right side of the page.     You will be asked to enter the access code listed below, as well as some personal information. Please follow the directions to create " "your username and password.     Your access code is: [email or web address]  Expires: 2018  3:27 PM     Your access code will  in 90 days. If you need help or a new code, please call your [place] clinic or [phone].        Care EveryWhere ID     This is your Care EveryWhere ID. This could be used by other organizations to access your [place] medical records  [id number]        Your Vitals Were     Pulse Temperature Respirations Height Last Period Pulse Oximetry    64 97.3  F (36.3  C) (Temporal) 16 5' 5\" (1.651 m) 12/10/2017 (Exact Date) 100%    Breastfeeding? BMI (Body Mass Index)                No 20.63 kg/m2           Blood Pressure from Last 3 Encounters:   18 100/62   18 98/54   18 90/64    Weight from Last 3 Encounters:   18 124 lb (56.2 kg)   18 123 lb (55.8 kg)   18 125 lb (56.7 kg)              Today, you had the following     No orders found for display       Primary Care Provider Office Phone # Fax #    Shelli Morrison, CHRISSY [facility] [phone]       [address] [place] [address]        Equal Access to Services     JOSE R MARTINEZ AH: Hadii adrian mcmanus hadasho Soomaali, waaxda luqadaha, qaybta kaalmada adeegyada, waxay idiin haydeborah persaud . So [facility] [phone].    ATENCIÓN: Si habla español, tiene a hernandez disposición servicios gratuitos de asistencia lingüística. Llame al [phone].    We comply with applicable federal civil rights laws and [place] laws. We do not discriminate on the basis of race, color, national origin, age, disability, sex, sexual orientation, or gender identity.            Thank you!     Thank you for choosing [facility]  for your care. Our goal is always to provide you with excellent care. Hearing back from our patients is one way we can continue to improve our services. Please take a few minutes to complete the written survey that you may receive in the mail after your visit with us. Thank you!   "           Your Updated Medication List - Protect others around you: Learn how to safely use, store and throw away your medicines at www.disposemymeds.org.          This list is accurate as of 3/19/18 11:35 AM.  Always use your most recent med list.                   Brand Name Dispense Instructions for use Diagnosis    ondansetron 4 MG tablet    ZOFRAN    30 tablet    Take 1 tablet (4 mg) by mouth every 8 hours as needed for nausea    Encounter for pregnancy related examination in first trimester       oseltamivir 75 MG capsule    TAMIFLU    10 capsule    Take 1 capsule (75 mg) by mouth daily    Exposure to influenza       prenatal multivitamin plus iron 27-0.8 MG Tabs per tablet      Take 1 tablet by mouth daily        TYLENOL PO

## 2018-03-19 NOTE — PROGRESS NOTES
Feels well. No concerns. Nausea has improved a lot. Prenatal flowsheet info reviewed as listed above.rechekc in 3 weeks. Quad screen discussed and pt declined. SRUTHI King MD

## 2018-04-09 ENCOUNTER — PRENATAL OFFICE VISIT (OUTPATIENT)
Dept: FAMILY MEDICINE | Facility: CLINIC | Age: 33
End: 2018-04-09
Payer: COMMERCIAL

## 2018-04-09 VITALS
SYSTOLIC BLOOD PRESSURE: 94 MMHG | RESPIRATION RATE: 16 BRPM | BODY MASS INDEX: 21.33 KG/M2 | HEIGHT: 65 IN | DIASTOLIC BLOOD PRESSURE: 60 MMHG | TEMPERATURE: 96.7 F | OXYGEN SATURATION: 99 % | HEART RATE: 94 BPM | WEIGHT: 128 LBS

## 2018-04-09 DIAGNOSIS — Z34.92 ENCOUNTER FOR PREGNANCY RELATED EXAMINATION IN SECOND TRIMESTER: Primary | ICD-10-CM

## 2018-04-09 PROCEDURE — 99207 ZZC PRENATAL VISIT: CPT | Performed by: OBSTETRICS & GYNECOLOGY

## 2018-04-09 ASSESSMENT — PAIN SCALES - GENERAL: PAINLEVEL: NO PAIN (0)

## 2018-04-09 NOTE — MR AVS SNAPSHOT
After Visit Summary   4/9/2018    Debbie Gage    MRN: [id number]           Patient Information     Date Of Birth          1985        Visit Information        Provider Department      4/9/2018 2:40 PM Ranjith King MD [facility]        Today's Diagnoses     Encounter for pregnancy related examination in second trimester    -  1       Follow-ups after your visit        Your next 10 appointments already scheduled     Apr 30, 2018  7:30 AM CDT   (Arrive by 7:15 AM)   US OB > 14 WEEKS COMPLETE SINGLE with PHUS1   [facility] ([facility])    [address] [place] [id number] [phone]           Please bring a list of your medicines (including vitamins, minerals and over-the-counter drugs). Also, tell your doctor about any allergies you may have. Wear comfortable clothes and leave your valuables at home.  If you re less than 20 weeks drink four 8-ounce glasses of fluid an hour before your exam. If you need to empty your bladder before your exam, try to release only a little urine. Then, drink another glass of fluid.  You may have up to two family members in the exam room. If you bring a small child, an adult must be there to care for him or her.  Please call the Imaging Department at your exam site with any questions.            May 07, 2018  4:40 PM CDT   ESTABL[facility] PRENATAL with [facility]     [facility] [id number] [phone]              Future tests that were ordered for you today     Open Future Orders        Priority Expected Expires Ordered    US OB > 14 Weeks Complete Single Routine  4/9/2019 4/9/2018            Who to contact     If you have questions or need follow up information about today's clinic visit or your schedule please contact [facility] directly at [phone].  Normal or  "non-critical lab and imaging results will be communicated to you by MyChart, letter or phone within 4 business days after the clinic has received the results. If you do not hear from us within 7 days, please contact the clinic through [employer] or phone. If you have a critical or abnormal lab result, we will notify you by phone as soon as possible.  Submit refill requests through [employer] or call your pharmacy and they will forward the refill request to us. Please allow 3 business days for your refill to be completed.          Additional Information About Your Visit        [employer] Information     [employer] lets you send messages to your doctor, view your test results, renew your prescriptions, schedule appointments and more. To sign up, go to [email or web address] . Click on \"Log in\" on the left side of the screen, which will take you to the Welcome page. Then click on \"Sign up Now\" on the right side of the page.     You will be asked to enter the access code listed below, as well as some personal information. Please follow the directions to create your username and password.     Your access code is: [email or web address]  Expires: 2018  3:27 PM     Your access code will  in 90 days. If you need help or a new code, please call your [place] clinic or [phone].        Care EveryWhere ID     This is your Care EveryWhere ID. This could be used by other organizations to access your [place] medical records  [id number]        Your Vitals Were     Pulse Temperature Respirations Height Last Period Pulse Oximetry    94 96.7  F (35.9  C) (Temporal) 16 5' 5\" (1.651 m) 12/10/2017 (Exact Date) 99%    Breastfeeding? BMI (Body Mass Index)                No 21.3 kg/m2           Blood Pressure from Last 3 Encounters:   18 94/60   18 100/62   18 98/54    Weight from Last 3 Encounters:   18 128 lb (58.1 kg)   18 124 lb (56.2 kg)   18 123 lb (55.8 kg)               Primary Care Provider Office " Phone # Fax #    Shelli Morrison, CHRISSY [facility] [phone] [address] [place] [address]        Equal Access to Services     JOSE R MARTINEZ : Hadii aad ku hadlazaropina Soharleyali, waaxda luqadaha, qaybta kaalmada adejanett, kevin pinoparmjit valdiviadhruv huitron cori gerber. So [facility] [phone].    ATENCIÓN: Si habla español, tiene a hernandez disposición servicios gratuitos de asistencia lingüística. Llame al [phone].    We comply with applicable federal civil rights laws and [place] laws. We do not discriminate on the basis of race, color, national origin, age, disability, sex, sexual orientation, or gender identity.            Thank you!     Thank you for choosing [facility]  for your care. Our goal is always to provide you with excellent care. Hearing back from our patients is one way we can continue to improve our services. Please take a few minutes to complete the written survey that you may receive in the mail after your visit with us. Thank you!             Your Updated Medication List - Protect others around you: Learn how to safely use, store and throw away your medicines at www.disposemymeds.org.          This list is accurate as of 4/9/18  2:47 PM.  Always use your most recent med list.                   Brand Name Dispense Instructions for use Diagnosis    ondansetron 4 MG tablet    ZOFRAN    30 tablet    Take 1 tablet (4 mg) by mouth every 8 hours as needed for nausea    Encounter for pregnancy related examination in first trimester       prenatal multivitamin plus iron 27-0.8 MG Tabs per tablet      Take 1 tablet by mouth daily        TYLENOL PO

## 2018-04-09 NOTE — NURSING NOTE
"Chief Complaint   Patient presents with     Prenatal Care     17w1d       Initial BP 94/60  Pulse 94  Temp 96.7  F (35.9  C) (Temporal)  Resp 16  Ht 5' 5\" (1.651 m)  Wt 128 lb (58.1 kg)  LMP 12/10/2017 (Exact Date)  SpO2 99%  Breastfeeding? No  BMI 21.3 kg/m2 Estimated body mass index is 21.3 kg/(m^2) as calculated from the following:    Height as of this encounter: 5' 5\" (1.651 m).    Weight as of this encounter: 128 lb (58.1 kg)..   BP completed using cuff size: regular  Medication Rec Completed    Yusra Morejon CMA    "

## 2018-04-09 NOTE — PROGRESS NOTES
She is feeling some flutters now. Prenatal flowsheet info reviewed as listed above.  She is finally gaining weight well. Our plan is to recheck in 4 weeks and then have her 20 week US before I see her.SRUTHI King MD

## 2018-04-30 ENCOUNTER — HOSPITAL ENCOUNTER (OUTPATIENT)
Dept: ULTRASOUND IMAGING | Facility: CLINIC | Age: 33
Discharge: HOME | End: 2018-04-30
Attending: OBSTETRICS & GYNECOLOGY | Admitting: OBSTETRICS & GYNECOLOGY
Payer: COMMERCIAL

## 2018-04-30 DIAGNOSIS — Z34.92 ENCOUNTER FOR PREGNANCY RELATED EXAMINATION IN SECOND TRIMESTER: ICD-10-CM

## 2018-04-30 PROCEDURE — 76805 OB US >/= 14 WKS SNGL FETUS: CPT

## 2018-05-07 ENCOUNTER — TELEPHONE (OUTPATIENT)
Dept: FAMILY MEDICINE | Facility: CLINIC | Age: 33
End: 2018-05-07

## 2018-05-07 NOTE — TELEPHONE ENCOUNTER
Reason for Call:  Same Day Appointment, Requested Provider:  Ranjith King M.D.    PCP: Shelli Morrison    Reason for visit: OB follow up    Duration of symptoms:     Have you been treated for this in the past? Yes    Additional comments:     Can we leave a detailed message on this number? YES    Phone number patient can be reached at: Home number on file [phone] (home)    Best Time:     Call taken on 5/7/2018 at 12:45 PM by Joy Horner

## 2018-05-07 NOTE — TELEPHONE ENCOUNTER
Ok to see patient at 12:10pm on Monday 12:10pm.  Called and left message for patient to call clinic.  When she calls back please schedule her. Inder Rich MA

## 2018-05-14 ENCOUNTER — PRENATAL OFFICE VISIT (OUTPATIENT)
Dept: FAMILY MEDICINE | Facility: CLINIC | Age: 33
End: 2018-05-14

## 2018-05-14 VITALS
BODY MASS INDEX: 21.97 KG/M2 | RESPIRATION RATE: 16 BRPM | HEART RATE: 92 BPM | OXYGEN SATURATION: 100 % | DIASTOLIC BLOOD PRESSURE: 54 MMHG | SYSTOLIC BLOOD PRESSURE: 88 MMHG | TEMPERATURE: 98.7 F | WEIGHT: 132 LBS

## 2018-05-14 DIAGNOSIS — Z98.891 H/O CESAREAN SECTION: Primary | ICD-10-CM

## 2018-05-14 DIAGNOSIS — Z34.92 ENCOUNTER FOR PREGNANCY RELATED EXAMINATION IN SECOND TRIMESTER: ICD-10-CM

## 2018-05-14 PROCEDURE — 99207 ZZC PRENATAL VISIT: CPT | Performed by: OBSTETRICS & GYNECOLOGY

## 2018-05-14 ASSESSMENT — PAIN SCALES - GENERAL: PAINLEVEL: NO PAIN (0)

## 2018-05-14 NOTE — PROGRESS NOTES
She reports daily fetal movement and no concerns.  Prenatal flowsheet info reviewed as listed above.  US at 20 weeks was normal. Recheck advised in  3 week(s)  SRUTHI King MD

## 2018-05-14 NOTE — MR AVS SNAPSHOT
"              After Visit Summary   2018    Debbie Gage    MRN: [id number]           Patient Information     Date Of Birth          1985        Visit Information        Provider Department      2018 1:40 PM Ranjith King MD [facility]        Today's Diagnoses     H/O  section    -  1    Encounter for pregnancy related examination in second trimester           Follow-ups after your visit        Your next 10 appointments already scheduled     2018  5:00 PM CDT   ESTABLISHED PRENATAL with Ranjith King MD   [facility]    [address] [place] [address] [id number] [phone]              Who to contact     If you have questions or need follow up information about today's clinic visit or your schedule please contact [facility] directly at [phone].  Normal or non-critical lab and imaging results will be communicated to you by MyChart, letter or phone within 4 business days after the clinic has received the results. If you do not hear from us within 7 days, please contact the clinic through MyChart or phone. If you have a critical or abnormal lab result, we will notify you by phone as soon as possible.  Submit refill requests through [employer] or call your pharmacy and they will forward the refill request to us. Please allow 3 business days for your refill to be completed.          Additional Information About Your Visit        MyChart Information     [employer] lets you send messages to your doctor, view your test results, renew your prescriptions, schedule appointments and more. To sign up, go to [email or web address] . Click on \"Log in\" on the left side of the screen, which will take you to the Welcome page. Then click on \"Sign up Now\" on the right side of the page.     You will be asked to enter the access code listed below, as well as some personal information. Please follow " the directions to create your username and password.     Your access code is: [email or web address]  Expires: 2018  3:27 PM     Your access code will  in 90 days. If you need help or a new code, please call your [place] clinic or [phone].        Care EveryWhere ID     This is your Care EveryWhere ID. This could be used by other organizations to access your [place] medical records  [id number]        Your Vitals Were     Pulse Temperature Respirations Last Period Pulse Oximetry Breastfeeding?    92 98.7  F (37.1  C) (Temporal) 16 12/10/2017 (Exact Date) 100% No    BMI (Body Mass Index)                   21.97 kg/m2            Blood Pressure from Last 3 Encounters:   18 (!) 88/54   18 94/60   18 100/62    Weight from Last 3 Encounters:   18 132 lb (59.9 kg)   18 128 lb (58.1 kg)   18 124 lb (56.2 kg)              We Performed the Following     Maddy-Operative Worksheet  Section        Primary Care Provider Office Phone # Fax #    Shelli Morrison, CHRISSY [facility] [phone] [address] [place] [address]        Equal Access to Services     JOSE R MARTINEZ : Hadii aad ku hadasho Soomaali, waaxda luqadaha, qaybta kaalmada adeegyada, waxay idiin haygaryn hollie persaud . So [facility] [phone].    ATENCIÓN: Si habla español, tiene a hernandez disposición servicios gratuitos de asistencia lingüística. demi al [phone].    We comply with applicable federal civil rights laws and [place] laws. We do not discriminate on the basis of race, color, national origin, age, disability, sex, sexual orientation, or gender identity.            Thank you!     Thank you for choosing [facility]  for your care. Our goal is always to provide you with excellent care. Hearing back from our patients is one way we can continue to improve our services. Please take a few minutes to complete the written survey that you may receive in the mail after your visit  with us. Thank you!             Your Updated Medication List - Protect others around you: Learn how to safely use, store and throw away your medicines at www.disposemymeds.org.          This list is accurate as of 5/14/18  2:24 PM.  Always use your most recent med list.                   Brand Name Dispense Instructions for use Diagnosis    ondansetron 4 MG tablet    ZOFRAN    30 tablet    Take 1 tablet (4 mg) by mouth every 8 hours as needed for nausea    Encounter for pregnancy related examination in first trimester       prenatal multivitamin plus iron 27-0.8 MG Tabs per tablet      Take 1 tablet by mouth daily        TYLENOL PO

## 2018-06-04 ENCOUNTER — PRENATAL OFFICE VISIT (OUTPATIENT)
Dept: FAMILY MEDICINE | Facility: CLINIC | Age: 33
End: 2018-06-04

## 2018-06-04 VITALS
TEMPERATURE: 98.9 F | SYSTOLIC BLOOD PRESSURE: 94 MMHG | HEART RATE: 78 BPM | RESPIRATION RATE: 16 BRPM | OXYGEN SATURATION: 100 % | DIASTOLIC BLOOD PRESSURE: 60 MMHG | BODY MASS INDEX: 23.13 KG/M2 | WEIGHT: 139 LBS

## 2018-06-04 DIAGNOSIS — Z34.92 ENCOUNTER FOR PREGNANCY RELATED EXAMINATION IN SECOND TRIMESTER: Primary | ICD-10-CM

## 2018-06-04 PROCEDURE — 99207 ZZC PRENATAL VISIT: CPT | Performed by: OBSTETRICS & GYNECOLOGY

## 2018-06-04 ASSESSMENT — PAIN SCALES - GENERAL: PAINLEVEL: NO PAIN (0)

## 2018-06-04 NOTE — PROGRESS NOTES
She reports daily fetal movement and no concerns except some left ribcage pain for several days- No hemoptysis reported.  O2 sat is 100%. Exam shows some pain over the rib on the left side- it is sore to palpation.no redness. The pain is reproduced by touching the rib- likely she pulled a muscle-Chest is clear to auscultation.  No wheezes, rales or rhonchi heard.  Cardiac exam is normal with s1, s2, no murmurs or adventitious sounds.Normal rate and rhythm is heard.   Prenatal flowsheet info reviewed as listed above.  Recheck advised in  2 week(s)- sooner if hemoptysis or other concerns such as dyspnea.  SRUTHI Knig MD

## 2018-06-04 NOTE — MR AVS SNAPSHOT
After Visit Summary   6/4/2018    Debbie Gage    MRN: [id number]           Patient Information     Date Of Birth          1985        Visit Information        Provider Department      6/4/2018 5:00 PM Ranjith King MD [facility]         Follow-ups after your visit        Your next 10 appointments already scheduled     Jun 18, 2018 11:00 AM CDT   ESTABLISHED PRENATAL with Ranjith King MD   [facility]    [address] [id number] [phone]            Sep 12, 2018   Procedure with Ranjith King MD   [facility] ([facility])    [address] [place] [address] [id number] [phone]           From [address]: Exit at [address] on south side of [place]. Turn right on [address]. Turn left at stoplight on [address]. [facility] will be in view two blocks ahead              Who to contact     If you have questions or need follow up information about today's clinic visit or your schedule please contact [facility] directly at [phone].  Normal or non-critical lab and imaging results will be communicated to you by MyChart, letter or phone within 4 business days after the clinic has received the results. If you do not hear from us within 7 days, please contact the clinic through [employer] or phone. If you have a critical or abnormal lab result, we will notify you by phone as soon as possible.  Submit refill requests through [email or web address] or call your pharmacy and they will forward the refill request to us. Please allow 3 business days for your refill to be completed.          Additional Information About Your Visit        MyChart Information     [email or web address] lets you send messages to your doctor, view your test results, renew your prescriptions, schedule appointments and more. To sign up, go to [email or web address]  ". Click on \"Log in\" on the left side of the screen, which will take you to the Welcome page. Then click on \"Sign up Now\" on the right side of the page.     You will be asked to enter the access code listed below, as well as some personal information. Please follow the directions to create your username and password.     Your access code is: [email or web address]  Expires: 2018  6:02 PM     Your access code will  in 90 days. If you need help or a new code, please call your [place] clinic or [phone].        Care EveryWhere ID     This is your Care EveryWhere ID. This could be used by other organizations to access your [place] medical records  [id number]        Your Vitals Were     Pulse Temperature Respirations Last Period Pulse Oximetry Breastfeeding?    78 98.9  F (37.2  C) (Temporal) 16 12/10/2017 (Exact Date) 100% No    BMI (Body Mass Index)                   23.13 kg/m2            Blood Pressure from Last 3 Encounters:   18 94/60   18 (!) 88/54   18 94/60    Weight from Last 3 Encounters:   18 139 lb (63 kg)   18 132 lb (59.9 kg)   18 128 lb (58.1 kg)              Today, you had the following     No orders found for display       Primary Care Provider Office Phone # Fax #    Shelli CHRISSY [place] [facility] [phone]       [address] [place] [address]        Equal Access to Services     [facility] AH: Hadii aad ku hadasho Soomaali, waaxda luqadaha, qaybta kaalmada adeegyada, kevin persaud . So [facility] [phone].    ATENCIÓN: Si habla español, tiene a hernandez disposición servicios gratuitos de asistencia lingüística. Llame al [phone].    We comply with applicable federal civil rights laws and [place] laws. We do not discriminate on the basis of race, color, national origin, age, disability, sex, sexual orientation, or gender identity.            Thank you!     Thank you for choosing [facility]  for your " care. Our goal is always to provide you with excellent care. Hearing back from our patients is one way we can continue to improve our services. Please take a few minutes to complete the written survey that you may receive in the mail after your visit with us. Thank you!             Your Updated Medication List - Protect others around you: Learn how to safely use, store and throw away your medicines at www.disposemymeds.org.          This list is accurate as of 6/4/18  6:02 PM.  Always use your most recent med list.                   Brand Name Dispense Instructions for use Diagnosis    ondansetron 4 MG tablet    ZOFRAN    30 tablet    Take 1 tablet (4 mg) by mouth every 8 hours as needed for nausea    Encounter for pregnancy related examination in first trimester       prenatal multivitamin plus iron 27-0.8 MG Tabs per tablet      Take 1 tablet by mouth daily        TYLENOL PO

## 2018-06-18 ENCOUNTER — PRENATAL OFFICE VISIT (OUTPATIENT)
Dept: FAMILY MEDICINE | Facility: CLINIC | Age: 33
End: 2018-06-18
Payer: COMMERCIAL

## 2018-06-18 VITALS
DIASTOLIC BLOOD PRESSURE: 62 MMHG | RESPIRATION RATE: 16 BRPM | HEART RATE: 72 BPM | TEMPERATURE: 97.3 F | OXYGEN SATURATION: 100 % | BODY MASS INDEX: 24.16 KG/M2 | HEIGHT: 65 IN | SYSTOLIC BLOOD PRESSURE: 102 MMHG | WEIGHT: 145 LBS

## 2018-06-18 DIAGNOSIS — Z34.93 ENCOUNTER FOR PREGNANCY RELATED EXAMINATION IN THIRD TRIMESTER: Primary | ICD-10-CM

## 2018-06-18 LAB
GLUCOSE 1H P 50 G GLC PO SERPL-MCNC: 96 MG/DL (ref 60–129)
HGB BLD-MCNC: 11.6 G/DL (ref 11.7–15.7)

## 2018-06-18 PROCEDURE — 36415 COLL VENOUS BLD VENIPUNCTURE: CPT | Performed by: OBSTETRICS & GYNECOLOGY

## 2018-06-18 PROCEDURE — 82950 GLUCOSE TEST: CPT | Performed by: OBSTETRICS & GYNECOLOGY

## 2018-06-18 PROCEDURE — 86780 TREPONEMA PALLIDUM: CPT | Performed by: OBSTETRICS & GYNECOLOGY

## 2018-06-18 PROCEDURE — 00000218 ZZHCL STATISTIC OBHBG - HEMOGLOBIN: Performed by: OBSTETRICS & GYNECOLOGY

## 2018-06-18 PROCEDURE — 99207 ZZC PRENATAL VISIT: CPT | Performed by: OBSTETRICS & GYNECOLOGY

## 2018-06-18 ASSESSMENT — PAIN SCALES - GENERAL: PAINLEVEL: NO PAIN (0)

## 2018-06-18 NOTE — MR AVS SNAPSHOT
After Visit Summary   6/18/2018    Debbie Gage    MRN: [id number]           Patient Information     Date Of Birth          1985        Visit Information        Provider Department      6/18/2018 11:00 AM Ranjith King MD [facility]        Today's Diagnoses     Encounter for pregnancy related examination in third trimester    -  1       Follow-ups after your visit        Your next 10 appointments already scheduled     Jun 25, 2018  2:00 PM CDT   ESTABLISHED PRENATAL with Ranjith King MD   [facility] ([facility])    [address] [place] [address]   [phone]            Jul 02, 2018  1:40 PM CDT   ESTABLISHED PRENATAL with Ranjith King MD   [facility] ([facility])    [address] [place] [address]   [phone]            Jul 09, 2018  1:40 PM CDT   ESTABLISHED PRENATAL with Ranjith King MD   [facility] ([facility])    [address] [place] [address]   [phone]            Jul 16, 2018  1:40 PM CDT   ESTABLISHED PRENATAL with Ranjith King MD   [facility] ([facility])    [address] [place] [address]   [phone]            Jul 23, 2018  1:40 PM CDT   ESTABLISHED PRENATAL with Ranjith King MD   [facility] ([facility])    [address] [place] [address]   [phone]            Jul 30, 2018  1:40 PM CDT   ESTABLISHED PRENATAL with Ranjith King MD   [facility] ([facility])    [address] [place] [address]   [phone]            Aug 06, 2018  1:40 PM CDT   ESTABLISHED PRENATAL with Ranjith King MD   [facility] ([facility])    [address] [place] [address]  "[address]   [phone]            Aug 13, 2018  1:40 PM CDT   ESTABLISHED PRENATAL with Ranjith King MD   [facility] ([address] [address]   [phone]            Aug 20, 2018  1:40 PM CDT   ESTABLISHED PRENATAL with Ranjith King MD   [facility] ([address] [address]   [phone]            Aug 27, 2018  1:40 PM CDT   ESTABLISHED PRENATAL with Ranjith King MD   [facility] ([address] [address]   [phone]              Who to contact     If you have questions or need follow up information about today's clinic visit or your schedule please contact [facility] directly at [phone].  Normal or non-critical lab and imaging results will be communicated to you by My Ad Boxhart, letter or phone within 4 business days after the clinic has received the results. If you do not hear from us within 7 days, please contact the clinic through Netcontinuumt or phone. If you have a critical or abnormal lab result, we will notify you by phone as soon as possible.  Submit refill requests through Lingorami or call your pharmacy and they will forward the refill request to us. Please allow 3 business days for your refill to be completed.          Additional Information About Your Visit        Lingorami Information     Lingorami lets you send messages to your doctor, view your test results, renew your prescriptions, schedule appointments and more. To sign up, go to [email or web address] . Click on \"Log in\" on the left side of the screen, which will take you to the Welcome page. Then click on \"Sign up Now\" on the right side of the page.     You will be asked to enter the access code listed below, as well as some personal information. Please follow the directions to create your " "username and password.     Your access code is: [email or web address]  Expires: 2018  6:02 PM     Your access code will  in 90 days. If you need help or a new code, please call your [place] clinic or [phone].        Care EveryWhere ID     This is your Care EveryWhere ID. This could be used by other organizations to access your [place] medical records  [id number]        Your Vitals Were     Pulse Temperature Respirations Height Last Period Pulse Oximetry    72 97.3  F (36.3  C) (Temporal) 16 5' 5\" (1.651 m) 12/10/2017 (Exact Date) 100%    Breastfeeding? BMI (Body Mass Index)                No 24.13 kg/m2           Blood Pressure from Last 3 Encounters:   18 102/62   18 94/60   18 (!) 88/54    Weight from Last 3 Encounters:   18 145 lb (65.8 kg)   18 139 lb (63 kg)   18 132 lb (59.9 kg)              We Performed the Following     Glucose tolerance, gest screen, 1 hour     OB hemoglobin     Treponema Abs w Reflex to RPR and Titer        Primary Care Provider Office Phone # Fax #    CHRISSY Smith [facility] [phone] [address] [place] [address]        Equal Access to Services     JESSICA MARTINEZ : Hadii aad ku hadasho Soomaali, waaxda luqadaha, qaybta kaalmada adeegyada, kevin gerber. So [facility] [phone].    ATENCIÓN: Si habla español, tiene a hernandez disposición servicios gratuitos de asistencia lingüística. Llame al [phone].    We comply with applicable federal civil rights laws and [place] laws. We do not discriminate on the basis of race, color, national origin, age, disability, sex, sexual orientation, or gender identity.            Thank you!     Thank you for choosing [facility]  for your care. Our goal is always to provide you with excellent care. Hearing back from our patients is one way we can continue to improve our services. Please take a few minutes to complete the written survey that " you may receive in the mail after your visit with us. Thank you!             Your Updated Medication List - Protect others around you: Learn how to safely use, store and throw away your medicines at www.disposemymeds.org.          This list is accurate as of 6/18/18 11:43 AM.  Always use your most recent med list.                   Brand Name Dispense Instructions for use Diagnosis    prenatal multivitamin plus iron 27-0.8 MG Tabs per tablet      Take 1 tablet by mouth daily        TYLENOL PO

## 2018-06-18 NOTE — PROGRESS NOTES
She reports daily fetal movement and no concerns.  Prenatal flowsheet info reviewed as listed above.  glucola today.. Recheck advised in  1 week(s)  SRUTHI King MD

## 2018-06-19 ENCOUNTER — TELEPHONE (OUTPATIENT)
Dept: FAMILY MEDICINE | Facility: CLINIC | Age: 33
End: 2018-06-19

## 2018-06-19 LAB — T PALLIDUM AB SER QL: NONREACTIVE

## 2018-06-19 NOTE — TELEPHONE ENCOUNTER
----- Message from Ranjith King MD sent at 6/18/2018  2:17 PM CDT -----  Yusra Please inform Debbie/ or caretaker  that this result(s) is/are normal.  The hemoglobin is slightly low so make sure she stays on her prenatal vitamin with iron.  Thanks. SRUTHI King MD

## 2018-06-25 ENCOUNTER — APPOINTMENT (OUTPATIENT)
Dept: ULTRASOUND IMAGING | Facility: CLINIC | Age: 33
End: 2018-06-25
Attending: OBSTETRICS & GYNECOLOGY
Payer: COMMERCIAL

## 2018-06-25 ENCOUNTER — PRENATAL OFFICE VISIT (OUTPATIENT)
Dept: FAMILY MEDICINE | Facility: CLINIC | Age: 33
End: 2018-06-25
Payer: COMMERCIAL

## 2018-06-25 ENCOUNTER — TELEPHONE (OUTPATIENT)
Dept: OBGYN | Facility: CLINIC | Age: 33
End: 2018-06-25

## 2018-06-25 ENCOUNTER — HOSPITAL ENCOUNTER (OUTPATIENT)
Facility: CLINIC | Age: 33
LOS: 1 days | Discharge: HOME | End: 2018-06-25
Attending: OBSTETRICS & GYNECOLOGY | Admitting: OBSTETRICS & GYNECOLOGY
Payer: COMMERCIAL

## 2018-06-25 VITALS
DIASTOLIC BLOOD PRESSURE: 55 MMHG | TEMPERATURE: 98.5 F | RESPIRATION RATE: 16 BRPM | HEART RATE: 76 BPM | SYSTOLIC BLOOD PRESSURE: 94 MMHG

## 2018-06-25 VITALS — DIASTOLIC BLOOD PRESSURE: 70 MMHG | SYSTOLIC BLOOD PRESSURE: 110 MMHG

## 2018-06-25 DIAGNOSIS — R11.2 NAUSEA AND VOMITING, INTRACTABILITY OF VOMITING NOT SPECIFIED, UNSPECIFIED VOMITING TYPE: Primary | ICD-10-CM

## 2018-06-25 PROBLEM — R19.7 DIARRHEA: Status: ACTIVE | Noted: 2018-06-25

## 2018-06-25 LAB
ALBUMIN SERPL-MCNC: 2.9 G/DL (ref 3.4–5)
ALBUMIN UR-MCNC: NEGATIVE MG/DL
ALP SERPL-CCNC: 80 U/L (ref 40–150)
ALT SERPL W P-5'-P-CCNC: 29 U/L (ref 0–50)
AMYLASE SERPL-CCNC: 44 U/L (ref 30–110)
ANION GAP SERPL CALCULATED.3IONS-SCNC: 8 MMOL/L (ref 3–14)
APPEARANCE UR: ABNORMAL
AST SERPL W P-5'-P-CCNC: 23 U/L (ref 0–45)
BACTERIA #/AREA URNS HPF: ABNORMAL /HPF
BILIRUB SERPL-MCNC: 0.6 MG/DL (ref 0.2–1.3)
BILIRUB UR QL STRIP: NEGATIVE
BUN SERPL-MCNC: 5 MG/DL (ref 7–30)
CALCIUM SERPL-MCNC: 8.2 MG/DL (ref 8.5–10.1)
CHLORIDE SERPL-SCNC: 104 MMOL/L (ref 94–109)
CO2 SERPL-SCNC: 25 MMOL/L (ref 20–32)
COLOR UR AUTO: YELLOW
CREAT SERPL-MCNC: 0.53 MG/DL (ref 0.52–1.04)
ERYTHROCYTE [DISTWIDTH] IN BLOOD BY AUTOMATED COUNT: 13.3 % (ref 10–15)
GFR SERPL CREATININE-BSD FRML MDRD: >90 ML/MIN/1.7M2
GLUCOSE SERPL-MCNC: 88 MG/DL (ref 70–99)
GLUCOSE UR STRIP-MCNC: NEGATIVE MG/DL
HCT VFR BLD AUTO: 36.2 % (ref 35–47)
HGB BLD-MCNC: 12.1 G/DL (ref 11.7–15.7)
HGB UR QL STRIP: NEGATIVE
KETONES UR STRIP-MCNC: 5 MG/DL
LEUKOCYTE ESTERASE UR QL STRIP: ABNORMAL
LIPASE SERPL-CCNC: 161 U/L (ref 73–393)
MCH RBC QN AUTO: 30.2 PG (ref 26.5–33)
MCHC RBC AUTO-ENTMCNC: 33.4 G/DL (ref 31.5–36.5)
MCV RBC AUTO: 90 FL (ref 78–100)
MUCOUS THREADS #/AREA URNS LPF: PRESENT /LPF
NITRATE UR QL: NEGATIVE
PH UR STRIP: 6 PH (ref 5–7)
PLATELET # BLD AUTO: 249 10E9/L (ref 150–450)
POTASSIUM SERPL-SCNC: 3.4 MMOL/L (ref 3.4–5.3)
PROT SERPL-MCNC: 6.8 G/DL (ref 6.8–8.8)
RBC # BLD AUTO: 4.01 10E12/L (ref 3.8–5.2)
RBC #/AREA URNS AUTO: 1 /HPF (ref 0–2)
SODIUM SERPL-SCNC: 137 MMOL/L (ref 133–144)
SOURCE: ABNORMAL
SP GR UR STRIP: 1.01 (ref 1–1.03)
SQUAMOUS #/AREA URNS AUTO: 5 /HPF (ref 0–1)
UROBILINOGEN UR STRIP-MCNC: 0 MG/DL (ref 0–2)
WBC # BLD AUTO: 8.8 10E9/L (ref 4–11)
WBC #/AREA URNS AUTO: 3 /HPF (ref 0–5)

## 2018-06-25 PROCEDURE — 25000128 H RX IP 250 OP 636: Performed by: OBSTETRICS & GYNECOLOGY

## 2018-06-25 PROCEDURE — 99207 ZZC COMPLICATED OB VISIT: CPT | Performed by: OBSTETRICS & GYNECOLOGY

## 2018-06-25 PROCEDURE — 96361 HYDRATE IV INFUSION ADD-ON: CPT

## 2018-06-25 PROCEDURE — 76815 OB US LIMITED FETUS(S): CPT

## 2018-06-25 PROCEDURE — 96360 HYDRATION IV INFUSION INIT: CPT

## 2018-06-25 PROCEDURE — 85027 COMPLETE CBC AUTOMATED: CPT | Performed by: OBSTETRICS & GYNECOLOGY

## 2018-06-25 PROCEDURE — 82150 ASSAY OF AMYLASE: CPT | Performed by: OBSTETRICS & GYNECOLOGY

## 2018-06-25 PROCEDURE — 80053 COMPREHEN METABOLIC PANEL: CPT | Performed by: OBSTETRICS & GYNECOLOGY

## 2018-06-25 PROCEDURE — 83690 ASSAY OF LIPASE: CPT | Performed by: OBSTETRICS & GYNECOLOGY

## 2018-06-25 PROCEDURE — 36415 COLL VENOUS BLD VENIPUNCTURE: CPT | Performed by: OBSTETRICS & GYNECOLOGY

## 2018-06-25 PROCEDURE — 96374 THER/PROPH/DIAG INJ IV PUSH: CPT

## 2018-06-25 PROCEDURE — 81001 URINALYSIS AUTO W/SCOPE: CPT | Performed by: OBSTETRICS & GYNECOLOGY

## 2018-06-25 PROCEDURE — G0463 HOSPITAL OUTPT CLINIC VISIT: HCPCS

## 2018-06-25 RX ORDER — SODIUM CHLORIDE, SODIUM LACTATE, POTASSIUM CHLORIDE, CALCIUM CHLORIDE 600; 310; 30; 20 MG/100ML; MG/100ML; MG/100ML; MG/100ML
INJECTION, SOLUTION INTRAVENOUS CONTINUOUS
Status: DISCONTINUED | OUTPATIENT
Start: 2018-06-25 | End: 2018-06-25 | Stop reason: HOSPADM

## 2018-06-25 RX ORDER — ONDANSETRON 2 MG/ML
4 INJECTION INTRAMUSCULAR; INTRAVENOUS EVERY 6 HOURS PRN
Status: DISCONTINUED | OUTPATIENT
Start: 2018-06-25 | End: 2018-06-25 | Stop reason: HOSPADM

## 2018-06-25 RX ADMIN — SODIUM CHLORIDE, POTASSIUM CHLORIDE, SODIUM LACTATE AND CALCIUM CHLORIDE: 600; 310; 30; 20 INJECTION, SOLUTION INTRAVENOUS at 15:28

## 2018-06-25 RX ADMIN — SODIUM CHLORIDE, POTASSIUM CHLORIDE, SODIUM LACTATE AND CALCIUM CHLORIDE 1000 ML: 600; 310; 30; 20 INJECTION, SOLUTION INTRAVENOUS at 14:44

## 2018-06-25 RX ADMIN — ONDANSETRON 4 MG: 2 INJECTION INTRAMUSCULAR; INTRAVENOUS at 14:44

## 2018-06-25 NOTE — IP AVS SNAPSHOT
[facility]    [address]     [place] [address]    Phone:  [phone]                                       After Visit Summary   6/25/2018    Debbie Gage    MRN: [id number]           After Visit Summary Signature Page     I have received my discharge instructions, and my questions have been answered. I have discussed any challenges I see with this plan with the nurse or doctor.    ..........................................................................................................................................  Patient/Patient Representative Signature      ..........................................................................................................................................  Patient Representative Print Name and Relationship to Patient    ..................................................               ................................................  Date                                            Time    ..........................................................................................................................................  Reviewed by Signature/Title    ...................................................              ..............................................  Date                                                            Time

## 2018-06-25 NOTE — TELEPHONE ENCOUNTER
": 1985  PHONE #'s: [phone] (home)     PRESENTING PROBLEM:  Pt is 28 weeks pg. Woke up at midnight with stomach pain, vomiting and diarrhea. Stomach pain is worse.  Has vomited 5 times since midnight, and diarrhea every 10 minutes.     NURSING ASSESSMENT  Description:   Has extreme pain in her abdomen. T99. Feeling thirsty, NOT urinating much at a time.   Contractions-onset/frequency/duration:  \" I can't tell. I just have extreme stomach pain. \"   Low back pain:  no    Pelvic pressure:  no  Cramping:  yes - just cramping everywhere like pains.   Bleeding/color/amount:  NO vaginal bleeding or discharge.   UTI Sx:  no    Precip. factors:  Etiology unknown.   Hx of problems with pregnancy:  NA  Improves/worsens Sx:   Worse bathroom for diarrhea every 10 minutes. Starting to feel thirsty and NOT urinating much.  Baby activity (FKC >27wk 10/2hr):  Yes,   Sx specific meds:  PNV  Last exam/Tx:  Has NOT been seen for this. ' I have an aurelio today with Dr. King at 2 PM. Should I wait for that or what should I do?      RECOMMENDED DISPOSITION: To ED, another person to drive - Her  stayed home from work to be with her today.   Will comply with recommendation: YES   If further questions/concerns or if Sx do not improve, worsen or new Sx develop, call your PCP or [facility] as soon as possible.    NOTES:  Disposition was determined by the first positive assessment question, therefore all previous assessment questions were negative.  Informed to check provider manual or call insurance company to assure coverage.    Guideline used: Pregnancy, Nausea and vomiting.   Telephone Triage Protocols for Nurses, Fifth Edition, Sherry Vasquez RN    "

## 2018-06-25 NOTE — MR AVS SNAPSHOT
After Visit Summary   6/25/2018    Debbie Gage    MRN: [id number]           Patient Information     Date Of Birth          1985        Visit Information        Provider Department      6/25/2018 2:00 PM Ranjith King MD [facility]        Today's Diagnoses     Nausea and vomiting, intractability of vomiting not specified, unspecified vomiting type    -  1       Follow-ups after your visit        Your next 10 appointments already scheduled     Jun 25, 2018  2:00 PM CDT   ESTABLISHED PRENATAL with Ranjith King MD   [facility] ([facility])    [address] [place] [address]   [phone]            Jul 02, 2018  1:40 PM CDT   ESTABLISHED PRENATAL with Ranjith King MD   [facility] ([facility])    [address] [place] [address]   [phone]            Jul 09, 2018  1:40 PM CDT   ESTABLISHED PRENATAL with Ranjith King MD   [facility] ([facility])    [address] [place] [address]   [phone]            Jul 16, 2018  1:40 PM CDT   ESTABLISHED PRENATAL with Ranjith King MD   [facility] ([facility])    [address] [place] [address]   [phone]            Jul 23, 2018  1:40 PM CDT   ESTABLISHED PRENATAL with Ranjith King MD   [facility] ([facility])    [address] [place] [address]   [phone]            Jul 30, 2018  1:40 PM CDT   ESTABLISHED PRENATAL with Ranjith King MD   [facility] ([facility])    [address] [place] [address]   [phone]            Aug 06, 2018  1:40 PM CDT   ESTABLISHED PRENATAL with Ranjith King MD   [facility] ([address]  "[facility] [address]   [phone]            Aug 13, 2018  1:40 PM CDT   ESTABLISHED PRENATAL with Ranjith King MD   [facility] ([address] [address]   [phone]            Aug 20, 2018  1:40 PM CDT   ESTABLISHED PRENATAL with Ranjith King MD   [facility]    [address] [place] [address] [phone]            Aug 27, 2018  1:40 PM CDT   ESTABLISHED PRENATAL with Ranjith King MD   [facility] ([address] [address] [phone]              Who to contact     If you have questions or need follow up information about today's clinic visit or your schedule please contact [facility] directly at [phone].  Normal or non-critical lab and imaging results will be communicated to you by SpikeSourcehart, letter or phone within 4 business days after the clinic has received the results. If you do not hear from us within 7 days, please contact the clinic through Mingleplayt or phone. If you have a critical or abnormal lab result, we will notify you by phone as soon as possible.  Submit refill requests through Brighter Future Challenge or call your pharmacy and they will forward the refill request to us. Please allow 3 business days for your refill to be completed.          Additional Information About Your Visit        Brighter Future Challenge Information     Brighter Future Challenge lets you send messages to your doctor, view your test results, renew your prescriptions, schedule appointments and more. To sign up, go to [email or web address] . Click on \"Log in\" on the left side of the screen, which will take you to the Welcome page. Then click on \"Sign up Now\" on the right side of the page.     You will be asked to enter the access code listed below, as well as some personal information. Please follow the " directions to create your username and password.     Your access code is: [email or web address]  Expires: 2018  6:02 PM     Your access code will  in 90 days. If you need help or a new code, please call your [place] clinic or [phone].        Care EveryWhere ID     This is your Care EveryWhere ID. This could be used by other organizations to access your [place] medical records  [id number]        Your Vitals Were     Last Period                   12/10/2017 (Exact Date)            Blood Pressure from Last 3 Encounters:   18 102/62   18 94/60   18 (!) 88/54    Weight from Last 3 Encounters:   18 145 lb (65.8 kg)   18 139 lb (63 kg)   18 132 lb (59.9 kg)              We Performed the Following     Amylase     CBC with platelets     Comprehensive metabolic panel     Lipase        Primary Care Provider Office Phone # Fax #    Sehlli Morrison, APRN [facility] [phone] [address] [place] [address]        Equal Access to Services     [facility]: Hadii aad ku hadasho Soomaali, waaxda luqadaha, qaybta kaalmada adejanett, kevin persaud . So [facility] [phone].    ATENCIÓN: Si habla español, tiene a hernandez disposición servicios gratuitos de asistencia lingüística. [facility] [phone].    We comply with applicable federal civil rights laws and [place] laws. We do not discriminate on the basis of race, color, national origin, age, disability, sex, sexual orientation, or gender identity.            Thank you!     Thank you for choosing [facility]  for your care. Our goal is always to provide you with excellent care. Hearing back from our patients is one way we can continue to improve our services. Please take a few minutes to complete the written survey that you may receive in the mail after your visit with us. Thank you!             Your Updated Medication List - Protect others around you: Learn how to safely use,  store and throw away your medicines at www.disposemymeds.org.          This list is accurate as of 6/25/18  1:33 PM.  Always use your most recent med list.                   Brand Name Dispense Instructions for use Diagnosis    prenatal multivitamin plus iron 27-0.8 MG Tabs per tablet      Take 1 tablet by mouth daily        TYLENOL PO

## 2018-06-25 NOTE — DISCHARGE INSTRUCTIONS
OB Triage Discharge Instructions    Diet:  Drink 8-10 glasses of water and / or juice every day. Start with ice chips and advance to sips of liquids.     Activity:  Rest and no work until; feeling 100%.     Other Special Instructions: Call Dr King's office tomorrow with an update. Lots of rest, don't push food too fast.      Reason for being seen in L&D: Norovirus    Treatment/procedures performed in L&D: IV fluids, monitoring, labs, ultra sound, medication for nausea, collect stool culture     Call [facility] at [phone] if you have:  ? 5 or more contractions in one hour  ? Leaking of fluid from your vaginal area  ? Decreased fetal movement (follow kick count instructions)  ? Bleeding from your vaginal area  ? Swelling in your face, or increased swelling in your hands or legs  ? Headaches or vision problems such as blurring or seeing spots or starts  ? Nausea or vomiting lasting for more than 24 hours  ? An increase in weight (5lbs/week)  ? Epigastric pain (sometimes confused with heartburn that does not go away or a very bad stomach ache)    If you have any questions, please follow up with your doctor.        Patient Signature: ______________________________________________________________  By signing the above I acknowledge that a nurse or my care provider has explained the instruction to me and I have had any questions regarding my care explained to me.        Discharge Nurse Signature: _______________________________ 6/25/2018  4:20 PM    Method of discharge: ambulation    Accompanied by: family  Time of discharge: 1635

## 2018-06-25 NOTE — PROGRESS NOTES
S:  Discharge from triage.   A:  FHT's 135, Moderate variability, Accels present, no decels noted. Contractions none.  Cervical exam not checked this visit. Patient here with Norovirus. Nausea and diarrhea have stopped. Patient received Zofran and IV fluids and reports feeling ready to go home. Patient tolerated ice chips well. Will be slowly advancing fluids.   R:  Written and verbal D/C instruction provided. Pt. Verbalized understanding of D/C instructions and will follow up with Dr King tomorrow and as previously scheduled.   Verbalizes understanding that she will call or return to [facility] with any further questions or concerns.   Pt. D/C'd via ambulation with family    Nursing Discharge Checklist  Discharge order entered: Yes  Patient care order entered: Yes  Charges entered: Yes  IV start and stop times have been documented in Epic? Yes  NST start and stop times have been documented in Epic Doc F/S? N/A

## 2018-06-25 NOTE — IP AVS SNAPSHOT
MRN:[id number]                      After Visit Summary   6/25/2018    Debbie Gage    MRN: [id number]           Thank you!     Thank you for choosing [facility] for your care. Our goal is always to provide you with excellent care. Hearing back from our patients is one way we can continue to improve our services. Please take a few minutes to complete the written survey that you may receive in the mail after you visit with us. Thank you!        Patient Information     Date Of Birth          1985        Designated Caregiver       Most Recent Value    Caregiver    Will someone help with your care after discharge? yes    Name of designated caregiver Pradeep []      About your hospital stay     You were admitted on:  June 25, 2018 You last received care in the:  [facility]    You were discharged on:  June 25, 2018       Who to Call     For medical emergencies, please call 911.  For non-urgent questions about your medical care, please call your primary care provider or clinic, [phone]          Attending Provider     Provider Specialty    Ranjith King MD [facility]       Primary Care Provider Office Phone # Fax #    Shelli CHRISSY [facility] [phone]      Your next 10 appointments already scheduled     Jul 02, 2018  1:40 PM CDT   ESTABLISHED PRENATAL with Ranjith King MD   [facility] ([address] [phone] [phone]            Jul 09, 2018  1:40 PM CDT   ESTABLISHED PRENATAL with Ranjith King MD   [facility] ([address] [phone]            Jul 16, 2018  1:40 PM CDT   ESTABLISHED PRENATAL with Ranjith King MD   [facility] ([address] [address] [phone]             Jul 23, 2018  1:40 PM CDT   ESTABLISHED PRENATAL with Ranjith King MD   [facility]    [address] [place] [address]   [phone]            Jul 30, 2018  1:40 PM CDT   ESTABLISHED PRENATAL with Ranjith King MD   [facility]    [address] [place] [address]   [phone]            Aug 06, 2018  1:40 PM CDT   ESTABLISHED PRENATAL with Ranjith King MD   [facility]    [address] [place] [address]   [phone]            Aug 13, 2018  1:40 PM CDT   ESTABLISHED PRENATAL with Ranjith King MD   [facility]    [address] [place] [address]   [phone]            Aug 20, 2018  1:40 PM CDT   ESTABLISHED PRENATAL with Ranjith King MD   [facility]    [address] [place] [address]   [phone]            Aug 27, 2018  1:40 PM CDT   ESTABLISHED PRENATAL with Ranjith King MD   [facility]    [address] [place] [address]   [phone]            Sep 04, 2018  1:00 PM CDT   ESTABLISHED PRENATAL with Ranjith King MD   [facility]    [address] [place] [address]   [phone]              Further instructions from your care team                OB Triage Discharge Instructions    Diet:  Drink 8-10 glasses of water and / or juice every day. Start with ice chips and advance to sips of liquids.     Activity:  Rest and no work until; feeling 100%.     Other Special Instructions: Call Dr King's office tomorrow with an update. Lots of rest, don't push food too fast.      Reason for being seen in L&D:  "Norovirus    Treatment/procedures performed in L&D: IV fluids, monitoring, labs, ultra sound, medication for nausea, collect stool culture     Call the Birthplace at [phone] if you have:  ? 5 or more contractions in one hour  ? Leaking of fluid from your vaginal area  ? Decreased fetal movement (follow kick count instructions)  ? Bleeding from your vaginal area  ? Swelling in your face, or increased swelling in your hands or legs  ? Headaches or vision problems such as blurring or seeing spots or starts  ? Nausea or vomiting lasting for more than 24 hours  ? An increase in weight (5lbs/week)  ? Epigastric pain (sometimes confused with heartburn that does not go away or a very bad stomach ache)    If you have any questions, please follow up with your doctor.        Patient Signature: ______________________________________________________________  By signing the above I acknowledge that a nurse or my care provider has explained the instruction to me and I have had any questions regarding my care explained to me.        Discharge Nurse Signature: _______________________________ 6/25/2018  4:20 PM    Method of discharge: ambulation    Accompanied by: family  Time of discharge: 1635        Pending Results     Date and Time Order Name Status Description    6/25/2018 1343 US OB Limited One Or More Fetuses Port In process             Admission Information     Date & Time Provider Department Dept. Phone    6/25/2018 Ranjith King MD [facility] [phone]      Your Vitals Were     Blood Pressure Pulse Temperature Respirations Last Period       94/55 76 98.5  F (36.9  C) (Oral) 16 12/10/2017 (Exact Date)       MyChart Information     [facility] lets you send messages to your doctor, view your test results, renew your prescriptions, schedule appointments and more. To sign up, go to [email or web address] . Click on \"Log in\" on the left side of the screen, which will take you to the Welcome " "page. Then click on \"Sign up Now\" on the right side of the page.     You will be asked to enter the access code listed below, as well as some personal information. Please follow the directions to create your username and password.     Your access code is: [email or web address]  Expires: 2018  6:02 PM     Your access code will  in 90 days. If you need help or a new code, please call your [facility] clinic or [phone].        Care EveryWhere ID     This is your Care EveryWhere ID. This could be used by other organizations to access your [facility] medical records  [id number]        Equal Access to Services     : Yossi Mir, osvaldo magdaleno, cristina jaramillo, kevin persaud . So [facility] [phone].    ATENCIÓN: Si habla español, tiene a hernandez disposición servicios gratuitos de asistencia lingüística. Llame al [phone].    We comply with applicable federal civil rights laws and [place] laws. We do not discriminate on the basis of race, color, national origin, age, disability, sex, sexual orientation, or gender identity.               Review of your medicines      UNREVIEWED medicines. Ask your doctor about these medicines        Dose / Directions    prenatal multivitamin plus iron 27-0.8 MG Tabs per tablet        Dose:  1 tablet   Take 1 tablet by mouth daily   Refills:  0       TYLENOL PO        Refills:  0                Protect others around you: Learn how to safely use, store and throw away your medicines at www.disposemymeds.org.             Medication List: This is a list of all your medications and when to take them. Check marks below indicate your daily home schedule. Keep this list as a reference.      Medications           Morning Afternoon Evening Bedtime As Needed    prenatal multivitamin plus iron 27-0.8 MG Tabs per tablet   Take 1 tablet by mouth daily                                TYLENOL PO                                  "

## 2018-06-25 NOTE — PROGRESS NOTES
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  She has the same symptoms that they have.  There is no blood seen in the stool or emesis.  She has felt daily fetal movement. mild headache since the vomiting started  Prenatal flowsheet info reviewed as listed above.  /70.  Pulse 84  R 16  HEENT normal- mucous membranes moist-  Neck is supple, mobile, no adenopathy or masses palpable. The thyroid feels normal.   Normal range of motion noted.  Chest is clear to auscultation.  No wheezes, rales or rhonchi heard.  Cardiac exam is normal with s1, s2, no murmurs or adventitious sounds.Normal rate and rhythm is heard.   Abdomen is soft,  nondistended, No masses felt.No HSM. No guarding or rigidity or rebound   noted. Palpation reveals  Some  tenderness diffusely- decreased bowel sounds heard.   Fundal height = dates-  extremities without edema- no jaundice seen  Assessment:   I suspect she has the norovirus and is getting dehydrated  Plan: will send her to L and D for US and monitoring and then obtain lab tests and give IV fluids and then reassess. SRUTHI King MD

## 2018-06-25 NOTE — PROGRESS NOTES
S: Triage Arrival  B: Debbie is a 33 y.o.  @ 28w 1d who presents with complaint of abdominal pain & diarrhea since midnight last evening.   A: Pt presented from clinic, Dr. King ordered labs, ultrasound, UA, IV Zofran & stool sample to be collected.  R: Will explain plan of care to pt & place on fetal monitor.

## 2018-06-26 NOTE — H&P
Admitted:     2018      ADDENDUM       As far as the physical exam and other components of today's visit, please see the prenatal record from the clinic visit, in addition to what was reported for her time up in Labor and Delivery.         LATONIA BASHIR MD             D: 2018   T: 2018   MT: SONYA      Name:     HEATHER VALENZUELA   MRN:      [id number]        Account:      [id number]   :      1985        Admitted:     2018                   Document: [id number]

## 2018-06-26 NOTE — PROGRESS NOTES
Visit Date:   2018      SUBJECTIVE:  Heather came in to the clinic today with nausea and vomiting and diarrhea and a diffuse mild abdominal pain.  She was seen in clinic and I determined that she needed to be seen up in labor and delivery for IV hydration.  We did check a number of lab tests including a comprehensive panel and it was essentially normal except for a low calcium and low albumin.  The pancreatic enzymes were normal.  Urinalysis was unremarkable.  There was no protein in urine.        She had an ultrasound done which showed reassuring activity for the fetus and no concerns about the placenta.  The amniotic fluid index was normal.  She was given 2 liters of IV hydration and felt much, much improved.  She was also given IV Zofran for nausea.  After that, she told me that she was feeling much improved and was asking to be discharged to home.        I did advise her to take the next few days off work and to collect some stool for testing for viruses and enteric bacteria.  We will look specifically for the norovirus.  She should drink small sips of fluid throughout the day and not worry too much about eating, but make sure she gets the fluids in.  She is instructed to call us tomorrow in the clinic for a followup report.        We did fetal monitoring and it was quite reassuring with a category 1 tracing.  With this in mind, I feel it is safe to send her home and she will call us if she has any concerns.  We have already arranged for a followup visit in clinic next week and she will call me tomorrow.               LATONIA BASHIR MD             D: 2018   T: 2018   MT: RODOLFO      Name:     HEATHER VALENZUELA   MRN:      [id number]        Account:      [id number]   :      1985           Visit Date:   2018      Document: [id number]

## 2018-07-09 ENCOUNTER — PRENATAL OFFICE VISIT (OUTPATIENT)
Dept: FAMILY MEDICINE | Facility: CLINIC | Age: 33
End: 2018-07-09
Payer: COMMERCIAL

## 2018-07-09 VITALS
OXYGEN SATURATION: 100 % | SYSTOLIC BLOOD PRESSURE: 108 MMHG | TEMPERATURE: 98.5 F | WEIGHT: 146 LBS | HEART RATE: 76 BPM | HEIGHT: 65 IN | DIASTOLIC BLOOD PRESSURE: 62 MMHG | BODY MASS INDEX: 24.32 KG/M2 | RESPIRATION RATE: 16 BRPM

## 2018-07-09 DIAGNOSIS — Z34.93 ENCOUNTER FOR PREGNANCY RELATED EXAMINATION IN THIRD TRIMESTER: Primary | ICD-10-CM

## 2018-07-09 PROCEDURE — 99207 ZZC PRENATAL VISIT: CPT | Performed by: OBSTETRICS & GYNECOLOGY

## 2018-07-09 ASSESSMENT — PAIN SCALES - GENERAL: PAINLEVEL: NO PAIN (0)

## 2018-07-09 NOTE — MR AVS SNAPSHOT
After Visit Summary   7/9/2018    Debbie Gage    MRN: [id number]           Patient Information     Date Of Birth          1985        Visit Information        Provider Department      7/9/2018 1:40 PM Ranjith King MD [facility]        Today's Diagnoses     Encounter for pregnancy related examination in third trimester    -  1       Follow-ups after your visit        Your next 10 appointments already scheduled     Jul 16, 2018  1:40 PM CDT   ESTABLISHED PRENATAL with Ranjith King MD   [facility]    [address] [place] [address]   [phone]            Jul 23, 2018  1:40 PM CDT   ESTABLISHED PRENATAL with Ranjith King MD   [facility]    [address] [place] [address]   [phone]            Jul 30, 2018  1:40 PM CDT   ESTABLISHED PRENATAL with Ranjith King MD   [facility]    [address] [place] [address]   [phone]            Aug 06, 2018  1:40 PM CDT   ESTABLISHED PRENATAL with Ranjith King MD   [facility]    [address] [place] [address]   [phone]            Aug 13, 2018  1:40 PM CDT   ESTABLISHED PRENATAL with Ranjith King MD   [facility]    [address] [place] [address]   [phone]            Aug 20, 2018  1:40 PM CDT   ESTABLISHED PRENATAL with Ranjith King MD   [facility]    [address] [place] [address]   [phone]            Aug 27, 2018  1:40 PM CDT   ESTABLISHED PRENATAL with Ranjith King MD   [facility]    [address] [place] [address]  "  [phone]            Sep 04, 2018  1:00 PM CDT   ESTABLISHED PRENATAL with Ranjith King MD   [facility]    [address] [place] [phone] [phone]            Sep 10, 2018   Procedure with Ranjith King MD   [facility] ([facility])    [address]  [place] [id number] [phone]           From [address]: Exit at [address] on south side of [place]. Turn right on [address]. Turn left at stoplight on [address]. [facility] will be in view two blocks ahead              Who to contact     If you have questions or need follow up information about today's clinic visit or your schedule please contact [facility] directly at [phone].  Normal or non-critical lab and imaging results will be communicated to you by MyChart, letter or phone within 4 business days after the clinic has received the results. If you do not hear from us within 7 days, please contact the clinic through MyChart or phone. If you have a critical or abnormal lab result, we will notify you by phone as soon as possible.  Submit refill requests through [employer] or call your pharmacy and they will forward the refill request to us. Please allow 3 business days for your refill to be completed.          Additional Information About Your Visit        Care EveryWhere ID     This is your Care EveryWhere ID. This could be used by other organizations to access your [facility] medical records  [id number]        Your Vitals Were     Pulse Temperature Respirations Height Last Period Pulse Oximetry    76 98.5  F (36.9  C) (Temporal) 16 5' 5\" (1.651 m) 12/10/2017 (Exact Date) 100%    Breastfeeding? BMI (Body Mass Index)                No 24.3 kg/m2           Blood Pressure from Last 3 Encounters:   07/09/18 108/62   06/25/18 94/55   06/25/18 110/70    Weight from Last 3 Encounters: "   07/09/18 146 lb (66.2 kg)   06/18/18 145 lb (65.8 kg)   06/04/18 139 lb (63 kg)              Today, you had the following     No orders found for display       Primary Care Provider Office Phone # Fax #    CHRISSY Smith RADHA [phone] [address] [place] [address]        Equal Access to Services     JESSICA MARTINEZ : Hadii aad ku hadasho Soomaali, waaxda luqadaha, qaybta kaalmada adeegyada, waxay idiin hayaan adeeg kharash la'aan ah. So [facility] [phone].    ATENCIÓN: Si habla español, tiene a hernandez disposición servicios gratuitos de asistencia lingüística. Llame al [phone].    We comply with applicable federal civil rights laws and [place] laws. We do not discriminate on the basis of race, color, national origin, age, disability, sex, sexual orientation, or gender identity.            Thank you!     Thank you for choosing [facility]  for your care. Our goal is always to provide you with excellent care. Hearing back from our patients is one way we can continue to improve our services. Please take a few minutes to complete the written survey that you may receive in the mail after your visit with us. Thank you!             Your Updated Medication List - Protect others around you: Learn how to safely use, store and throw away your medicines at www.disposemymeds.org.          This list is accurate as of 7/9/18  3:03 PM.  Always use your most recent med list.                   Brand Name Dispense Instructions for use Diagnosis    prenatal multivitamin plus iron 27-0.8 MG Tabs per tablet      Take 1 tablet by mouth daily        TYLENOL PO

## 2018-07-09 NOTE — PROGRESS NOTES
She reports daily fetal movement and no concerns.  Prenatal flowsheet info reviewed as listed above.  The fetus is vertex presentation by Leoplold's maneuvers.  Recheck advised in  2 week(s)  SRUTHI King MD

## 2018-07-23 ENCOUNTER — PRENATAL OFFICE VISIT (OUTPATIENT)
Dept: FAMILY MEDICINE | Facility: CLINIC | Age: 33
End: 2018-07-23
Payer: COMMERCIAL

## 2018-07-23 VITALS
RESPIRATION RATE: 16 BRPM | DIASTOLIC BLOOD PRESSURE: 60 MMHG | OXYGEN SATURATION: 100 % | TEMPERATURE: 96.8 F | HEART RATE: 76 BPM | BODY MASS INDEX: 24.49 KG/M2 | HEIGHT: 65 IN | WEIGHT: 147 LBS | SYSTOLIC BLOOD PRESSURE: 110 MMHG

## 2018-07-23 DIAGNOSIS — Z34.93 ENCOUNTER FOR PREGNANCY RELATED EXAMINATION IN THIRD TRIMESTER: Primary | ICD-10-CM

## 2018-07-23 DIAGNOSIS — Z23 NEED FOR VACCINATION: ICD-10-CM

## 2018-07-23 PROCEDURE — 90471 IMMUNIZATION ADMIN: CPT | Performed by: OBSTETRICS & GYNECOLOGY

## 2018-07-23 PROCEDURE — 90715 TDAP VACCINE 7 YRS/> IM: CPT | Performed by: OBSTETRICS & GYNECOLOGY

## 2018-07-23 PROCEDURE — 99207 ZZC PRENATAL VISIT: CPT | Performed by: OBSTETRICS & GYNECOLOGY

## 2018-07-23 ASSESSMENT — PAIN SCALES - GENERAL: PAINLEVEL: NO PAIN (0)

## 2018-07-23 NOTE — PROGRESS NOTES
She reports daily fetal movement and no concerns.  Prenatal flowsheet info reviewed as listed above.  The fetus is vertex presentation by Leoplold's maneuvers.  TDAP today. Recheck advised in  1 week(s)  SRUTHI King MD

## 2018-07-23 NOTE — MR AVS SNAPSHOT
After Visit Summary   7/23/2018    Debbie Gage    MRN: [id number]           Patient Information     Date Of Birth          1985        Visit Information        Provider Department      7/23/2018 1:40 PM Ranjith King MD [facility]         Follow-ups after your visit        Your next 10 appointments already scheduled     Jul 30, 2018  1:40 PM CDT   ESTABLISHED PRENATAL with Ranjith King MD   [facility] ([facility])    [address] [place] [address]   [phone]            Aug 06, 2018  1:40 PM CDT   ESTABLISHED PRENATAL with Ranjith King MD   [facility] ([facility])    [address] [place] [address]   [phone]            Aug 13, 2018  1:40 PM CDT   ESTABLISHED PRENATAL with Ranjith King MD   [facility] ([facility])    [address] [place] [address]   [phone]            Aug 20, 2018  1:40 PM CDT   ESTABLISHED PRENATAL with Ranjith King MD   [facility] ([facility])    [address] [place] [address]   [phone]            Aug 27, 2018  1:40 PM CDT   ESTABLISHED PRENATAL with Ranjith King MD   [facility] ([facility])    [address] [place] [address]   [phone]            Sep 04, 2018  1:00 PM CDT   ESTABLISHED PRENATAL with Ranjith King MD   [facility] ([facility])    [address] [place] [address]   [phone]            Sep 10, 2018   Procedure with Ranjith King MD   [facility] ([facility])    [address] [place] [address]   [phone]           From [address]: Exit at [address] on south side of [place]. Turn right on [address]  "[address]. Turn left at stoplight on [address]. [facility] will be in view two blocks ahead              Who to contact     If you have questions or need follow up information about today's clinic visit or your schedule please contact [facility] directly at [phone].  Normal or non-critical lab and imaging results will be communicated to you by MyChart, letter or phone within 4 business days after the clinic has received the results. If you do not hear from us within 7 days, please contact the clinic through MyChart or phone. If you have a critical or abnormal lab result, we will notify you by phone as soon as possible.  Submit refill requests through [employer] or call your pharmacy and they will forward the refill request to us. Please allow 3 business days for your refill to be completed.          Additional Information About Your Visit        Care EveryWhere ID     This is your Care EveryWhere ID. This could be used by other organizations to access your [facility] medical records  [id number]        Your Vitals Were     Pulse Temperature Respirations Height Last Period Pulse Oximetry    76 96.8  F (36  C) (Temporal) 16 5' 5\" (1.651 m) 12/10/2017 (Exact Date) 100%    Breastfeeding? BMI (Body Mass Index)                No 24.46 kg/m2           Blood Pressure from Last 3 Encounters:   07/23/18 100/60   07/09/18 108/62   06/25/18 94/55    Weight from Last 3 Encounters:   07/23/18 147 lb (66.7 kg)   07/09/18 146 lb (66.2 kg)   06/18/18 145 lb (65.8 kg)              Today, you had the following     No orders found for display       Primary Care Provider Office Phone # Fax #    CHRISSY Smith [phone]       [address] [place] [address]        Equal Access to Services     JOSE R MARTINEZ : Yossi Mir, osvaldo magdaleno, cristina jaramillo, kevin gerber. So [facility] [phone].    ATENCIÓN: Si rashid noriega, " tiene a hernandez disposición servicios gratuitos de asistencia lingüística. Hilda juarez [phone].    We comply with applicable federal civil rights laws and [place] laws. We do not discriminate on the basis of race, color, national origin, age, disability, sex, sexual orientation, or gender identity.            Thank you!     Thank you for choosing [facility]  for your care. Our goal is always to provide you with excellent care. Hearing back from our patients is one way we can continue to improve our services. Please take a few minutes to complete the written survey that you may receive in the mail after your visit with us. Thank you!             Your Updated Medication List - Protect others around you: Learn how to safely use, store and throw away your medicines at www.disposemymeds.org.          This list is accurate as of 7/23/18  1:56 PM.  Always use your most recent med list.                   Brand Name Dispense Instructions for use Diagnosis    prenatal multivitamin plus iron 27-0.8 MG Tabs per tablet      Take 1 tablet by mouth daily        TYLENOL PO

## 2018-07-23 NOTE — NURSING NOTE
Prior to injection verified patient identity using patient's name and date of birth.  Due to injection administration, patient instructed to remain in clinic for 15 minutes  afterwards, and to report any adverse reaction to me immediately.    Yusra Morejon CMA

## 2018-07-30 ENCOUNTER — PRENATAL OFFICE VISIT (OUTPATIENT)
Dept: FAMILY MEDICINE | Facility: CLINIC | Age: 33
End: 2018-07-30
Payer: COMMERCIAL

## 2018-07-30 VITALS
HEART RATE: 76 BPM | RESPIRATION RATE: 16 BRPM | HEIGHT: 65 IN | TEMPERATURE: 98.6 F | DIASTOLIC BLOOD PRESSURE: 60 MMHG | OXYGEN SATURATION: 100 % | SYSTOLIC BLOOD PRESSURE: 108 MMHG | WEIGHT: 151 LBS | BODY MASS INDEX: 25.16 KG/M2

## 2018-07-30 DIAGNOSIS — Z34.93 ENCOUNTER FOR PREGNANCY RELATED EXAMINATION IN THIRD TRIMESTER: Primary | ICD-10-CM

## 2018-07-30 PROCEDURE — 99207 ZZC COMPLICATED OB VISIT: CPT | Performed by: OBSTETRICS & GYNECOLOGY

## 2018-07-30 ASSESSMENT — PAIN SCALES - GENERAL: PAINLEVEL: NO PAIN (0)

## 2018-07-30 NOTE — PROGRESS NOTES
She reports daily fetal movement and no concerns except some trouble sleeping at night- tosses and turns a lot.no ctx.  Prenatal flowsheet info reviewed as listed above.  The fetus is vertex presentation by Leoplold's maneuvers. No edema noted. Size= dates.  Recheck advised in  1 week(s)  SRUTHI King MD

## 2018-07-30 NOTE — MR AVS SNAPSHOT
After Visit Summary   7/30/2018    Debbie Gage    MRN: [id number]           Patient Information     Date Of Birth          1985        Visit Information        Provider Department      7/30/2018 1:40 PM Ranjith King MD [facility]         Follow-ups after your visit        Your next 10 appointments already scheduled     Aug 06, 2018  1:40 PM CDT   ESTABLISHED PRENATAL with Ranjith King MD   [facility] ([facility])    [address] [place] [address]   [phone]            Aug 13, 2018  1:40 PM CDT   ESTABLISHED PRENATAL with Ranjith King MD   [facility] ([facility])    [address] [place] [address]   [phone]            Aug 20, 2018  1:40 PM CDT   ESTABLISHED PRENATAL with Ranjith King MD   [facility] ([facility])    [address] [place] [address]   [phone]            Aug 27, 2018  1:40 PM CDT   ESTABLISHED PRENATAL with Ranjith King MD   [facility] ([facility])    [address] [place] [address]   [phone]            Sep 04, 2018  1:00 PM CDT   ESTABLISHED PRENATAL with Ranjith King MD   [facility] ([facility])    [address] [place] [address]   [phone]            Sep 10, 2018   Procedure with Ranjith King MD   [facility] ([facility])    [address] [place] [address]   [phone]           From [address]: Exit at [address] on south side of [place]. Turn right on [address]. Turn left at stoplight on [address]. [facility] will be in view two blocks ahead              Who to contact     If you have questions or need follow up information about today's clinic visit or  "your schedule please contact [facility] directly at [phone].  Normal or non-critical lab and imaging results will be communicated to you by MyChart, letter or phone within 4 business days after the clinic has received the results. If you do not hear from us within 7 days, please contact the clinic through MyChart or phone. If you have a critical or abnormal lab result, we will notify you by phone as soon as possible.  Submit refill requests through Ak[employer] or call your pharmacy and they will forward the refill request to us. Please allow 3 business days for your refill to be completed.          Additional Information About Your Visit        Care EveryWhere ID     This is your Care EveryWhere ID. This could be used by other organizations to access your [facility] medical records  [id number]        Your Vitals Were     Pulse Temperature Respirations Height Last Period Pulse Oximetry    76 98.6  F (37  C) (Temporal) 16 5' 5\" (1.651 m) 12/10/2017 (Exact Date) 100%    Breastfeeding? BMI (Body Mass Index)                No 25.13 kg/m2           Blood Pressure from Last 3 Encounters:   07/30/18 108/60   07/23/18 110/60   07/09/18 108/62    Weight from Last 3 Encounters:   07/30/18 151 lb (68.5 kg)   07/23/18 147 lb (66.7 kg)   07/09/18 146 lb (66.2 kg)              Today, you had the following     No orders found for display       Primary Care Provider Office Phone # Fax #    CHRISSY Smith [facility] [phone]       [address] [place] [address]        Equal Access to Services     [facility] : Hadii aad ku hadasho Soomaali, waaxda luqadaha, qaybta kaalmada adeegyaniurka, kevin persaud . So [facility] [phone].    ATENCIÓN: Si habla español, tiene a hernandez disposición servicios gratuitos de asistencia lingüística. Llame al [phone].    We comply with applicable federal civil rights laws and [place] laws. We do not discriminate on the basis of race, " color, national origin, age, disability, sex, sexual orientation, or gender identity.            Thank you!     Thank you for choosing [facility]  for your care. Our goal is always to provide you with excellent care. Hearing back from our patients is one way we can continue to improve our services. Please take a few minutes to complete the written survey that you may receive in the mail after your visit with us. Thank you!             Your Updated Medication List - Protect others around you: Learn how to safely use, store and throw away your medicines at www.disposemymeds.org.          This list is accurate as of 7/30/18  1:53 PM.  Always use your most recent med list.                   Brand Name Dispense Instructions for use Diagnosis    prenatal multivitamin plus iron 27-0.8 MG Tabs per tablet      Take 1 tablet by mouth daily        TYLENOL PO

## 2018-08-06 ENCOUNTER — PRENATAL OFFICE VISIT (OUTPATIENT)
Dept: FAMILY MEDICINE | Facility: CLINIC | Age: 33
End: 2018-08-06
Payer: COMMERCIAL

## 2018-08-06 VITALS
HEART RATE: 86 BPM | OXYGEN SATURATION: 100 % | HEIGHT: 65 IN | TEMPERATURE: 98.2 F | WEIGHT: 153 LBS | BODY MASS INDEX: 25.49 KG/M2 | RESPIRATION RATE: 16 BRPM | DIASTOLIC BLOOD PRESSURE: 68 MMHG | SYSTOLIC BLOOD PRESSURE: 102 MMHG

## 2018-08-06 DIAGNOSIS — Z34.93 ENCOUNTER FOR PREGNANCY RELATED EXAMINATION IN THIRD TRIMESTER: Primary | ICD-10-CM

## 2018-08-06 PROCEDURE — 99207 ZZC PRENATAL VISIT: CPT | Performed by: OBSTETRICS & GYNECOLOGY

## 2018-08-06 PROCEDURE — 87653 STREP B DNA AMP PROBE: CPT | Performed by: OBSTETRICS & GYNECOLOGY

## 2018-08-06 ASSESSMENT — PAIN SCALES - GENERAL: PAINLEVEL: NO PAIN (0)

## 2018-08-06 NOTE — MR AVS SNAPSHOT
After Visit Summary   8/6/2018    Debbie Gage    MRN: [id number]           Patient Information     Date Of Birth          1985        Visit Information        Provider Department      8/6/2018 1:40 PM Ranjith King MD [facility]        Today's Diagnoses     Encounter for pregnancy related examination in third trimester    -  1       Follow-ups after your visit        Your next 10 appointments already scheduled     Aug 13, 2018  1:40 PM CDT   ESTABLISHED PRENATAL with Ranjith King MD   [facility] ([facility])    [address] [place] [address]   [phone]            Aug 20, 2018  1:40 PM CDT   ESTABLISHED PRENATAL with Ranjith King MD   [facility] ([facility])    [address] [place] [address]   [phone]            Aug 27, 2018  1:40 PM CDT   ESTABLISHED PRENATAL with Ranjith King MD   [facility] ([facility])    [address] [place] [address]   [phone]            Sep 04, 2018  1:00 PM CDT   ESTABLISHED PRENATAL with Ranjith King MD   [facility] ([facility])    [address] [place] [address]   [phone]            Sep 10, 2018   Procedure with Ranjith King MD   [facility] ([facility])    [address] [place] [address]   [phone]           From [address]: Exit at [address] on [address] [facility]. Turn right on [address]. Turn left at stoplight on [address]. [facility] will be in view two blocks ahead              Who to contact     If you have questions or need follow up information about today's clinic visit or your schedule please contact [facility] directly at [phone].  Normal or non-critical lab and imaging  "results will be communicated to you by MyChart, letter or phone within 4 business days after the clinic has received the results. If you do not hear from us within 7 days, please contact the clinic through MyChart or phone. If you have a critical or abnormal lab result, we will notify you by phone as soon as possible.  Submit refill requests through [email or web address] or call your pharmacy and they will forward the refill request to us. Please allow 3 business days for your refill to be completed.          Additional Information About Your Visit        Care EveryWhere ID     This is your Care EveryWhere ID. This could be used by other organizations to access your [facility] medical records  [id number]        Your Vitals Were     Pulse Temperature Respirations Height Last Period Pulse Oximetry    86 98.2  F (36.8  C) (Temporal) 16 5' 5\" (1.651 m) 12/10/2017 (Exact Date) 100%    BMI (Body Mass Index)                   25.46 kg/m2            Blood Pressure from Last 3 Encounters:   08/06/18 102/68   07/30/18 108/60   07/23/18 110/60    Weight from Last 3 Encounters:   08/06/18 153 lb (69.4 kg)   07/30/18 151 lb (68.5 kg)   07/23/18 147 lb (66.7 kg)              We Performed the Following     Strep, Group B by [facility]        Primary Care Provider Office Phone # Fax #    Shelli CHRISSY [facility] [phone] [address] [place] [address]        Equal Access to Services     [facility] AH: Hadii adrian estevezo Sopiero, waaxda luqadaha, qaybta kaalmada ella, kevin seaman adedhruv gerber. So [facility] [phone].    ATENCIÓN: Si habla español, tiene a hernandez disposición servicios gratuitos de asistencia lingüística. Hilda al [phone].    We comply with applicable federal civil rights laws and [place] laws. We do not discriminate on the basis of race, color, national origin, age, disability, sex, sexual orientation, or gender identity.            Thank you!     Thank you for choosing [facility]  for your care. Our goal is always to provide you with excellent care. Hearing back from our patients is one way we can continue to improve our services. Please take a few minutes to complete the written survey that you may receive in the mail after your visit with us. Thank you!             Your Updated Medication List - Protect others around you: Learn how to safely use, store and throw away your medicines at www.disposemymeds.org.          This list is accurate as of 8/6/18  1:53 PM.  Always use your most recent med list.                   Brand Name Dispense Instructions for use Diagnosis    prenatal multivitamin plus iron 27-0.8 MG Tabs per tablet      Take 1 tablet by mouth daily        TYLENOL PO

## 2018-08-06 NOTE — PROGRESS NOTES
She reports daily fetal movement and no concerns except she lost her mucous plug today- I told her to watch carefully and if ctx occur, let us know.  Prenatal flowsheet info reviewed as listed above.  The fetus is vertex presentation by Leoplold's maneuvers.  GBS will be done today. I reminded her to do daily kick counts and to recheck acutely if decreased fetal movement, ruptured membranes, vaginal bleeding or frequent ctx or pain, or if headaches, vision changes or significant edema develops.she will lay low the rest of this week-  Recheck advised in  1 week(s)  SRUTHI King MD

## 2018-08-07 LAB
GP B STREP DNA SPEC QL NAA+PROBE: NEGATIVE
SPECIMEN SOURCE: NORMAL

## 2018-08-08 ENCOUNTER — TELEPHONE (OUTPATIENT)
Dept: FAMILY MEDICINE | Facility: CLINIC | Age: 33
End: 2018-08-08

## 2018-08-08 NOTE — TELEPHONE ENCOUNTER
----- Message from Ranjith King MD sent at 8/8/2018 10:55 AM CDT -----  Yusra Please inform Debbie/ or caretaker  that this result(s) is/are normal.  Thanks. SRUTHI King MD

## 2018-08-13 ENCOUNTER — PRENATAL OFFICE VISIT (OUTPATIENT)
Dept: FAMILY MEDICINE | Facility: CLINIC | Age: 33
End: 2018-08-13
Payer: COMMERCIAL

## 2018-08-13 VITALS
HEART RATE: 98 BPM | BODY MASS INDEX: 25.78 KG/M2 | TEMPERATURE: 98.6 F | WEIGHT: 154.9 LBS | OXYGEN SATURATION: 100 % | DIASTOLIC BLOOD PRESSURE: 70 MMHG | RESPIRATION RATE: 16 BRPM | SYSTOLIC BLOOD PRESSURE: 110 MMHG

## 2018-08-13 DIAGNOSIS — Z34.93 ENCOUNTER FOR PREGNANCY RELATED EXAMINATION IN THIRD TRIMESTER: Primary | ICD-10-CM

## 2018-08-13 PROCEDURE — 99207 ZZC PRENATAL VISIT: CPT | Performed by: OBSTETRICS & GYNECOLOGY

## 2018-08-13 ASSESSMENT — PAIN SCALES - GENERAL: PAINLEVEL: SEVERE PAIN (6)

## 2018-08-13 NOTE — MR AVS SNAPSHOT
After Visit Summary   8/13/2018    Debbie Gage    MRN: [id number]           Patient Information     Date Of Birth          1985        Visit Information        Provider Department      8/13/2018 1:40 PM Ranjith King MD [facility]        Today's Diagnoses     Encounter for pregnancy related examination in third trimester    -  1       Follow-ups after your visit        Your next 10 appointments already scheduled     Aug 20, 2018  1:40 PM CDT   ESTABLISHED PRENATAL with Ranjith King MD   [facility] ([facility])    [address] [place] [address]   [phone]            Aug 27, 2018  1:40 PM CDT   ESTABLISHED PRENATAL with Ranjith King MD   [facility] ([facility])    [address] [place] [address]   [phone]            Sep 04, 2018  1:00 PM CDT   ESTABLISHED PRENATAL with Ranjith King MD   [facility] ([facility])    [address] [place] [address]   [phone]            Sep 10, 2018   Procedure with Ranjith King MD   [facility] ([facility])    [address] [place] [address]   [phone]           From [address]: Exit at [address] on south side of [place]. Turn right on [address]. Turn left at stoplight on [facility]. [facility] will be in view two blocks ahead              Who to contact     If you have questions or need follow up information about today's clinic visit or your schedule please contact [facility] directly at [phone].  Normal or non-critical lab and imaging results will be communicated to you by MyChart, letter or phone within 4 business days after the clinic has received the results. If you do not hear from us within 7 days, please contact the clinic through [employer]  or phone. If you have a critical or abnormal lab result, we will notify you by phone as soon as possible.  Submit refill requests through Open-Plug or call your pharmacy and they will forward the refill request to us. Please allow 3 business days for your refill to be completed.          Additional Information About Your Visit        Care EveryWhere ID     This is your Care EveryWhere ID. This could be used by other organizations to access your [facility] medical records  [id number]        Your Vitals Were     Pulse Temperature Respirations Last Period Pulse Oximetry BMI (Body Mass Index)    98 98.6  F (37  C) (Temporal) 16 12/10/2017 (Exact Date) 100% 25.78 kg/m2       Blood Pressure from Last 3 Encounters:   08/13/18 110/70   08/06/18 102/68   07/30/18 108/60    Weight from Last 3 Encounters:   08/13/18 154 lb 14.4 oz (70.3 kg)   08/06/18 153 lb (69.4 kg)   07/30/18 151 lb (68.5 kg)              Today, you had the following     No orders found for display       Primary Care Provider Office Phone # Fax #    Shelli Zahra Morrison, APRN [facility] [phone]       [address] [place] [address]        Equal Access to Services     JOSE R MARTINEZ : Hadii adrian ku hadasho Soomaali, waaxda luqadaha, qaybta kaalmada adeegyada, kevin gerber. So [facility] [phone].    ATENCIÓN: Si habla español, tiene a hernandez disposición servicios gratuitos de asistencia lingüística. Llame al [phone].    We comply with applicable federal civil rights laws and [place] laws. We do not discriminate on the basis of race, color, national origin, age, disability, sex, sexual orientation, or gender identity.            Thank you!     Thank you for choosing [facility]  for your care. Our goal is always to provide you with excellent care. Hearing back from our patients is one way we can continue to improve our services. Please take a few minutes to complete the written survey that you may receive  in the mail after your visit with us. Thank you!             Your Updated Medication List - Protect others around you: Learn how to safely use, store and throw away your medicines at www.disposemymeds.org.          This list is accurate as of 8/13/18  3:03 PM.  Always use your most recent med list.                   Brand Name Dispense Instructions for use Diagnosis    prenatal multivitamin plus iron 27-0.8 MG Tabs per tablet      Take 1 tablet by mouth daily        TYLENOL PO

## 2018-08-13 NOTE — PROGRESS NOTES
She reports daily fetal movement and no concerns.  Prenatal flowsheet info reviewed as listed above.  The fetus is vertex presentation by Leoplold's maneuvers.  Recheck advised in  1 week(s)  SRUTHI King MD

## 2018-08-20 ENCOUNTER — PRENATAL OFFICE VISIT (OUTPATIENT)
Dept: FAMILY MEDICINE | Facility: CLINIC | Age: 33
End: 2018-08-20
Payer: COMMERCIAL

## 2018-08-20 VITALS
DIASTOLIC BLOOD PRESSURE: 68 MMHG | BODY MASS INDEX: 26.16 KG/M2 | WEIGHT: 157 LBS | OXYGEN SATURATION: 99 % | HEIGHT: 65 IN | HEART RATE: 72 BPM | SYSTOLIC BLOOD PRESSURE: 104 MMHG | TEMPERATURE: 98.2 F | RESPIRATION RATE: 16 BRPM

## 2018-08-20 DIAGNOSIS — Z34.93 ENCOUNTER FOR PREGNANCY RELATED EXAMINATION IN THIRD TRIMESTER: Primary | ICD-10-CM

## 2018-08-20 PROCEDURE — 99207 ZZC PRENATAL VISIT: CPT | Performed by: OBSTETRICS & GYNECOLOGY

## 2018-08-20 ASSESSMENT — PAIN SCALES - GENERAL: PAINLEVEL: NO PAIN (0)

## 2018-08-20 NOTE — MR AVS SNAPSHOT
After Visit Summary   8/20/2018    Debbie Gage    MRN: [id number]           Patient Information     Date Of Birth          1985        Visit Information        Provider Department      8/20/2018 1:40 PM Ranjith King MD [facility]         Follow-ups after your visit        Your next 10 appointments already scheduled     Aug 27, 2018  1:40 PM CDT   ESTABLISHED PRENATAL with Ranjith King MD   [facility] ([facility])    [address] [place] [address]   [phone]            Sep 04, 2018 10:30 AM CDT   ESTABLISHED PRENATAL with Ranjith King MD   [facility] ([facility])    [address] [place] [address]   [phone]            Sep 10, 2018   Procedure with Ranjith King MD   [facility] ([facility])    [address] [place] [address]   [phone]           From [address]: Exit at [employer] on south side of [place]. Turn right on [address]. Turn left at stoplight on [address] [facility]. [facility] will be in view two blocks ahead              Who to contact     If you have questions or need follow up information about today's clinic visit or your schedule please contact [facility] directly at [phone].  Normal or non-critical lab and imaging results will be communicated to you by MyChart, letter or phone within 4 business days after the clinic has received the results. If you do not hear from us within 7 days, please contact the clinic through MyChart or phone. If you have a critical or abnormal lab result, we will notify you by phone as soon as possible.  Submit refill requests through [employer] or call your pharmacy and they will forward the refill request to us. Please allow 3 business days for your refill to be completed.          Additional Information  "About Your Visit        Care EveryWhere ID     This is your Care EveryWhere ID. This could be used by other organizations to access your [facility] medical records  [id number]        Your Vitals Were     Pulse Temperature Respirations Height Last Period Pulse Oximetry    72 98.2  F (36.8  C) (Temporal) 16 5' 5\" (1.651 m) 12/10/2017 (Exact Date) 99%    BMI (Body Mass Index)                   26.13 kg/m2            Blood Pressure from Last 3 Encounters:   08/20/18 104/68   08/13/18 110/70   08/06/18 102/68    Weight from Last 3 Encounters:   08/20/18 157 lb (71.2 kg)   08/13/18 154 lb 14.4 oz (70.3 kg)   08/06/18 153 lb (69.4 kg)              Today, you had the following     No orders found for display       Primary Care Provider Office Phone # Fax #    Shelli Zahra Morrison, APRN [facility] [phone]       [address] [place] [address]        Equal Access to Services     JESSICA [facility] : Hadii aad ku hadasho Soomaali, waaxda luqadaha, qaybta kaalmada adeegyada, [place] lindsayin haydeborah persaud . So [facility] [phone].    ATENCIÓN: Si habla español, tiene a hernandez disposición servicios gratuitos de asistencia lingüística. Llame al [phone].    We comply with applicable federal civil rights laws and [place] laws. We do not discriminate on the basis of race, color, national origin, age, disability, sex, sexual orientation, or gender identity.            Thank you!     Thank you for choosing [facility]  for your care. Our goal is always to provide you with excellent care. Hearing back from our patients is one way we can continue to improve our services. Please take a few minutes to complete the written survey that you may receive in the mail after your visit with us. Thank you!             Your Updated Medication List - Protect others around you: Learn how to safely use, store and throw away your medicines at www.disposemymeds.org.          This list is accurate as of 8/20/18  3:41 PM.  " Always use your most recent med list.                   Brand Name Dispense Instructions for use Diagnosis    prenatal multivitamin plus iron 27-0.8 MG Tabs per tablet      Take 1 tablet by mouth daily        TYLENOL PO

## 2018-08-21 ENCOUNTER — ANESTHESIA EVENT (OUTPATIENT)
Dept: OBGYN | Facility: CLINIC | Age: 33
End: 2018-08-21
Payer: COMMERCIAL

## 2018-08-21 ENCOUNTER — HOSPITAL ENCOUNTER (EMERGENCY)
Facility: CLINIC | Age: 33
End: 2018-08-21
Payer: COMMERCIAL

## 2018-08-21 ENCOUNTER — HOSPITAL ENCOUNTER (INPATIENT)
Facility: CLINIC | Age: 33
LOS: 2 days | Discharge: HOME | End: 2018-08-23
Attending: OBSTETRICS & GYNECOLOGY | Admitting: OBSTETRICS & GYNECOLOGY
Payer: COMMERCIAL

## 2018-08-21 ENCOUNTER — ANESTHESIA (OUTPATIENT)
Dept: OBGYN | Facility: CLINIC | Age: 33
End: 2018-08-21
Payer: COMMERCIAL

## 2018-08-21 ENCOUNTER — TELEPHONE (OUTPATIENT)
Dept: OBGYN | Facility: CLINIC | Age: 33
End: 2018-08-21

## 2018-08-21 DIAGNOSIS — Z98.891 S/P CESAREAN SECTION: Primary | ICD-10-CM

## 2018-08-21 PROBLEM — Z36.89 ENCOUNTER FOR TRIAGE IN PREGNANT PATIENT: Status: ACTIVE | Noted: 2018-08-21

## 2018-08-21 LAB
ABO + RH BLD: NORMAL
ABO + RH BLD: NORMAL
ALBUMIN UR-MCNC: NEGATIVE MG/DL
ALT SERPL W P-5'-P-CCNC: 22 U/L (ref 0–50)
APPEARANCE UR: CLEAR
AST SERPL W P-5'-P-CCNC: 20 U/L (ref 0–45)
BACTERIA #/AREA URNS HPF: ABNORMAL /HPF
BILIRUB UR QL STRIP: NEGATIVE
BLD GP AB SCN SERPL QL: NORMAL
BLOOD BANK CMNT PATIENT-IMP: NORMAL
COLOR UR AUTO: ABNORMAL
CREAT UR-MCNC: 18 MG/DL
ERYTHROCYTE [DISTWIDTH] IN BLOOD BY AUTOMATED COUNT: 13.2 % (ref 10–15)
GLUCOSE UR STRIP-MCNC: NEGATIVE MG/DL
HCT VFR BLD AUTO: 33.8 % (ref 35–47)
HGB BLD-MCNC: 11 G/DL (ref 11.7–15.7)
HGB UR QL STRIP: ABNORMAL
KETONES UR STRIP-MCNC: NEGATIVE MG/DL
LEUKOCYTE ESTERASE UR QL STRIP: ABNORMAL
MCH RBC QN AUTO: 28.1 PG (ref 26.5–33)
MCHC RBC AUTO-ENTMCNC: 32.5 G/DL (ref 31.5–36.5)
MCV RBC AUTO: 86 FL (ref 78–100)
NITRATE UR QL: NEGATIVE
PH UR STRIP: 8 PH (ref 5–7)
PLATELET # BLD AUTO: 261 10E9/L (ref 150–450)
PROT UR-MCNC: <0.05 G/L
PROT/CREAT 24H UR: NORMAL G/G CR (ref 0–0.2)
RBC # BLD AUTO: 3.91 10E12/L (ref 3.8–5.2)
RBC #/AREA URNS AUTO: 1 /HPF (ref 0–2)
SOURCE: ABNORMAL
SP GR UR STRIP: 1 (ref 1–1.03)
SPECIMEN EXP DATE BLD: NORMAL
SQUAMOUS #/AREA URNS AUTO: 1 /HPF (ref 0–1)
UROBILINOGEN UR STRIP-MCNC: 0 MG/DL (ref 0–2)
WBC # BLD AUTO: 9.2 10E9/L (ref 4–11)
WBC #/AREA URNS AUTO: <1 /HPF (ref 0–5)

## 2018-08-21 PROCEDURE — 59510 CESAREAN DELIVERY: CPT | Performed by: OBSTETRICS & GYNECOLOGY

## 2018-08-21 PROCEDURE — 86780 TREPONEMA PALLIDUM: CPT | Performed by: OBSTETRICS & GYNECOLOGY

## 2018-08-21 PROCEDURE — 71000014 ZZH RECOVERY PHASE 1 LEVEL 2 FIRST HR: Performed by: OBSTETRICS & GYNECOLOGY

## 2018-08-21 PROCEDURE — 86850 RBC ANTIBODY SCREEN: CPT | Performed by: OBSTETRICS & GYNECOLOGY

## 2018-08-21 PROCEDURE — 25000132 ZZH RX MED GY IP 250 OP 250 PS 637: Performed by: FAMILY MEDICINE

## 2018-08-21 PROCEDURE — 25000128 H RX IP 250 OP 636: Performed by: OBSTETRICS & GYNECOLOGY

## 2018-08-21 PROCEDURE — 27210794 ZZH OR GENERAL SUPPLY STERILE: Performed by: OBSTETRICS & GYNECOLOGY

## 2018-08-21 PROCEDURE — 25000132 ZZH RX MED GY IP 250 OP 250 PS 637: Performed by: OBSTETRICS & GYNECOLOGY

## 2018-08-21 PROCEDURE — 37000009 ZZH ANESTHESIA TECHNICAL FEE, EACH ADDTL 15 MIN: Performed by: OBSTETRICS & GYNECOLOGY

## 2018-08-21 PROCEDURE — 81001 URINALYSIS AUTO W/SCOPE: CPT | Performed by: OBSTETRICS & GYNECOLOGY

## 2018-08-21 PROCEDURE — 36000058 ZZH SURGERY LEVEL 3 EA 15 ADDTL MIN: Performed by: OBSTETRICS & GYNECOLOGY

## 2018-08-21 PROCEDURE — 84450 TRANSFERASE (AST) (SGOT): CPT | Performed by: OBSTETRICS & GYNECOLOGY

## 2018-08-21 PROCEDURE — 27110028 ZZH OR GENERAL SUPPLY NON-STERILE: Performed by: OBSTETRICS & GYNECOLOGY

## 2018-08-21 PROCEDURE — 86901 BLOOD TYPING SEROLOGIC RH(D): CPT | Performed by: OBSTETRICS & GYNECOLOGY

## 2018-08-21 PROCEDURE — 85027 COMPLETE CBC AUTOMATED: CPT | Performed by: OBSTETRICS & GYNECOLOGY

## 2018-08-21 PROCEDURE — 59514 CESAREAN DELIVERY ONLY: CPT | Mod: 80 | Performed by: FAMILY MEDICINE

## 2018-08-21 PROCEDURE — 25000125 ZZHC RX 250

## 2018-08-21 PROCEDURE — 36000056 ZZH SURGERY LEVEL 3 1ST 30 MIN: Performed by: OBSTETRICS & GYNECOLOGY

## 2018-08-21 PROCEDURE — G0463 HOSPITAL OUTPT CLINIC VISIT: HCPCS

## 2018-08-21 PROCEDURE — 25000125 ZZHC RX 250: Performed by: NURSE ANESTHETIST, CERTIFIED REGISTERED

## 2018-08-21 PROCEDURE — 86900 BLOOD TYPING SEROLOGIC ABO: CPT | Performed by: OBSTETRICS & GYNECOLOGY

## 2018-08-21 PROCEDURE — 36415 COLL VENOUS BLD VENIPUNCTURE: CPT | Performed by: OBSTETRICS & GYNECOLOGY

## 2018-08-21 PROCEDURE — 25000128 H RX IP 250 OP 636: Performed by: NURSE ANESTHETIST, CERTIFIED REGISTERED

## 2018-08-21 PROCEDURE — 84460 ALANINE AMINO (ALT) (SGPT): CPT | Performed by: OBSTETRICS & GYNECOLOGY

## 2018-08-21 PROCEDURE — 84156 ASSAY OF PROTEIN URINE: CPT | Performed by: OBSTETRICS & GYNECOLOGY

## 2018-08-21 PROCEDURE — 37000008 ZZH ANESTHESIA TECHNICAL FEE, 1ST 30 MIN: Performed by: OBSTETRICS & GYNECOLOGY

## 2018-08-21 PROCEDURE — 12000031 ZZH R&B OB CRITICAL

## 2018-08-21 RX ORDER — CEFAZOLIN SODIUM 2 G/100ML
2 INJECTION, SOLUTION INTRAVENOUS
Status: COMPLETED | OUTPATIENT
Start: 2018-08-21 | End: 2018-08-21

## 2018-08-21 RX ORDER — NALOXONE HYDROCHLORIDE 0.4 MG/ML
.1-.4 INJECTION, SOLUTION INTRAMUSCULAR; INTRAVENOUS; SUBCUTANEOUS
Status: ACTIVE | OUTPATIENT
Start: 2018-08-21 | End: 2018-08-22

## 2018-08-21 RX ORDER — OXYTOCIN/0.9 % SODIUM CHLORIDE 30/500 ML
PLASTIC BAG, INJECTION (ML) INTRAVENOUS
Status: DISCONTINUED
Start: 2018-08-21 | End: 2018-08-21 | Stop reason: HOSPADM

## 2018-08-21 RX ORDER — ONDANSETRON 2 MG/ML
4 INJECTION INTRAMUSCULAR; INTRAVENOUS EVERY 6 HOURS PRN
Status: DISCONTINUED | OUTPATIENT
Start: 2018-08-21 | End: 2018-08-21

## 2018-08-21 RX ORDER — NALBUPHINE HYDROCHLORIDE 10 MG/ML
2.5-5 INJECTION, SOLUTION INTRAMUSCULAR; INTRAVENOUS; SUBCUTANEOUS EVERY 6 HOURS PRN
Status: DISCONTINUED | OUTPATIENT
Start: 2018-08-21 | End: 2018-08-21

## 2018-08-21 RX ORDER — NALOXONE HYDROCHLORIDE 0.4 MG/ML
.1-.4 INJECTION, SOLUTION INTRAMUSCULAR; INTRAVENOUS; SUBCUTANEOUS
Status: DISCONTINUED | OUTPATIENT
Start: 2018-08-21 | End: 2018-08-21

## 2018-08-21 RX ORDER — AMOXICILLIN 250 MG
1 CAPSULE ORAL 2 TIMES DAILY PRN
Status: DISCONTINUED | OUTPATIENT
Start: 2018-08-21 | End: 2018-08-23 | Stop reason: HOSPADM

## 2018-08-21 RX ORDER — OXYTOCIN/0.9 % SODIUM CHLORIDE 30/500 ML
100 PLASTIC BAG, INJECTION (ML) INTRAVENOUS CONTINUOUS
Status: DISCONTINUED | OUTPATIENT
Start: 2018-08-21 | End: 2018-08-23 | Stop reason: HOSPADM

## 2018-08-21 RX ORDER — ONDANSETRON 2 MG/ML
4 INJECTION INTRAMUSCULAR; INTRAVENOUS EVERY 6 HOURS PRN
Status: DISCONTINUED | OUTPATIENT
Start: 2018-08-21 | End: 2018-08-23 | Stop reason: HOSPADM

## 2018-08-21 RX ORDER — SIMETHICONE 80 MG
80 TABLET,CHEWABLE ORAL 4 TIMES DAILY PRN
Status: DISCONTINUED | OUTPATIENT
Start: 2018-08-21 | End: 2018-08-23 | Stop reason: HOSPADM

## 2018-08-21 RX ORDER — CITRIC ACID/SODIUM CITRATE 334-500MG
30 SOLUTION, ORAL ORAL
Status: COMPLETED | OUTPATIENT
Start: 2018-08-21 | End: 2018-08-21

## 2018-08-21 RX ORDER — LIDOCAINE 40 MG/G
CREAM TOPICAL
Status: DISCONTINUED | OUTPATIENT
Start: 2018-08-21 | End: 2018-08-23 | Stop reason: HOSPADM

## 2018-08-21 RX ORDER — ONDANSETRON 4 MG/1
4 TABLET, ORALLY DISINTEGRATING ORAL EVERY 6 HOURS PRN
Status: DISCONTINUED | OUTPATIENT
Start: 2018-08-21 | End: 2018-08-21

## 2018-08-21 RX ORDER — SCOLOPAMINE TRANSDERMAL SYSTEM 1 MG/1
1 PATCH, EXTENDED RELEASE TRANSDERMAL ONCE
Status: DISCONTINUED | OUTPATIENT
Start: 2018-08-21 | End: 2018-08-21

## 2018-08-21 RX ORDER — BUPIVACAINE HYDROCHLORIDE AND EPINEPHRINE 5; 5 MG/ML; UG/ML
INJECTION, SOLUTION PERINEURAL PRN
Status: DISCONTINUED | OUTPATIENT
Start: 2018-08-21 | End: 2018-08-21

## 2018-08-21 RX ORDER — LANOLIN 100 %
OINTMENT (GRAM) TOPICAL
Status: DISCONTINUED | OUTPATIENT
Start: 2018-08-21 | End: 2018-08-23 | Stop reason: HOSPADM

## 2018-08-21 RX ORDER — OXYTOCIN 10 [USP'U]/ML
INJECTION, SOLUTION INTRAMUSCULAR; INTRAVENOUS PRN
Status: DISCONTINUED | OUTPATIENT
Start: 2018-08-21 | End: 2018-08-21

## 2018-08-21 RX ORDER — OXYCODONE HYDROCHLORIDE 5 MG/1
5 TABLET ORAL
Status: DISCONTINUED | OUTPATIENT
Start: 2018-08-21 | End: 2018-08-21

## 2018-08-21 RX ORDER — BISACODYL 10 MG
10 SUPPOSITORY, RECTAL RECTAL DAILY PRN
Status: DISCONTINUED | OUTPATIENT
Start: 2018-08-23 | End: 2018-08-23 | Stop reason: HOSPADM

## 2018-08-21 RX ORDER — DEXTROSE, SODIUM CHLORIDE, SODIUM LACTATE, POTASSIUM CHLORIDE, AND CALCIUM CHLORIDE 5; .6; .31; .03; .02 G/100ML; G/100ML; G/100ML; G/100ML; G/100ML
INJECTION, SOLUTION INTRAVENOUS CONTINUOUS
Status: DISCONTINUED | OUTPATIENT
Start: 2018-08-21 | End: 2018-08-23 | Stop reason: HOSPADM

## 2018-08-21 RX ORDER — METHYLERGONOVINE MALEATE 0.2 MG/ML
INJECTION INTRAVENOUS PRN
Status: DISCONTINUED | OUTPATIENT
Start: 2018-08-21 | End: 2018-08-21

## 2018-08-21 RX ORDER — FENTANYL CITRATE 50 UG/ML
25-50 INJECTION, SOLUTION INTRAMUSCULAR; INTRAVENOUS
Status: DISCONTINUED | OUTPATIENT
Start: 2018-08-21 | End: 2018-08-21

## 2018-08-21 RX ORDER — FENTANYL CITRATE 50 UG/ML
INJECTION, SOLUTION INTRAMUSCULAR; INTRAVENOUS PRN
Status: DISCONTINUED | OUTPATIENT
Start: 2018-08-21 | End: 2018-08-21

## 2018-08-21 RX ORDER — MISOPROSTOL 200 UG/1
400 TABLET ORAL
Status: DISCONTINUED | OUTPATIENT
Start: 2018-08-21 | End: 2018-08-23 | Stop reason: CLARIF

## 2018-08-21 RX ORDER — DIPHENHYDRAMINE HYDROCHLORIDE 50 MG/ML
25 INJECTION INTRAMUSCULAR; INTRAVENOUS EVERY 6 HOURS PRN
Status: DISCONTINUED | OUTPATIENT
Start: 2018-08-21 | End: 2018-08-23 | Stop reason: HOSPADM

## 2018-08-21 RX ORDER — DIPHENHYDRAMINE HCL 25 MG
25 CAPSULE ORAL EVERY 6 HOURS PRN
Status: DISCONTINUED | OUTPATIENT
Start: 2018-08-21 | End: 2018-08-23 | Stop reason: HOSPADM

## 2018-08-21 RX ORDER — HYDROMORPHONE HYDROCHLORIDE 1 MG/ML
.3-.5 INJECTION, SOLUTION INTRAMUSCULAR; INTRAVENOUS; SUBCUTANEOUS EVERY 5 MIN PRN
Status: DISCONTINUED | OUTPATIENT
Start: 2018-08-21 | End: 2018-08-21

## 2018-08-21 RX ORDER — OXYCODONE HYDROCHLORIDE 5 MG/1
5 TABLET ORAL ONCE
Status: COMPLETED | OUTPATIENT
Start: 2018-08-21 | End: 2018-08-21

## 2018-08-21 RX ORDER — LIDOCAINE 40 MG/G
CREAM TOPICAL
Status: DISCONTINUED | OUTPATIENT
Start: 2018-08-21 | End: 2018-08-21

## 2018-08-21 RX ORDER — EPHEDRINE SULFATE 50 MG/ML
5 INJECTION, SOLUTION INTRAMUSCULAR; INTRAVENOUS; SUBCUTANEOUS
Status: DISCONTINUED | OUTPATIENT
Start: 2018-08-21 | End: 2018-08-21

## 2018-08-21 RX ORDER — IBUPROFEN 600 MG/1
600 TABLET, FILM COATED ORAL EVERY 6 HOURS PRN
Status: DISCONTINUED | OUTPATIENT
Start: 2018-08-21 | End: 2018-08-23 | Stop reason: HOSPADM

## 2018-08-21 RX ORDER — OXYTOCIN 10 [USP'U]/ML
10 INJECTION, SOLUTION INTRAMUSCULAR; INTRAVENOUS
Status: DISCONTINUED | OUTPATIENT
Start: 2018-08-21 | End: 2018-08-23 | Stop reason: CLARIF

## 2018-08-21 RX ORDER — DEXAMETHASONE SODIUM PHOSPHATE 4 MG/ML
INJECTION, SOLUTION INTRA-ARTICULAR; INTRALESIONAL; INTRAMUSCULAR; INTRAVENOUS; SOFT TISSUE PRN
Status: DISCONTINUED | OUTPATIENT
Start: 2018-08-21 | End: 2018-08-21

## 2018-08-21 RX ORDER — OXYCODONE HYDROCHLORIDE 5 MG/1
5-10 TABLET ORAL EVERY 4 HOURS PRN
Status: DISCONTINUED | OUTPATIENT
Start: 2018-08-21 | End: 2018-08-23 | Stop reason: HOSPADM

## 2018-08-21 RX ORDER — HYDROCORTISONE 2.5 %
CREAM (GRAM) TOPICAL 3 TIMES DAILY PRN
Status: DISCONTINUED | OUTPATIENT
Start: 2018-08-21 | End: 2018-08-23 | Stop reason: HOSPADM

## 2018-08-21 RX ORDER — OXYTOCIN/0.9 % SODIUM CHLORIDE 30/500 ML
PLASTIC BAG, INJECTION (ML) INTRAVENOUS PRN
Status: DISCONTINUED | OUTPATIENT
Start: 2018-08-21 | End: 2018-08-21

## 2018-08-21 RX ORDER — ACETAMINOPHEN 325 MG/1
975 TABLET ORAL EVERY 8 HOURS PRN
Status: DISCONTINUED | OUTPATIENT
Start: 2018-08-21 | End: 2018-08-23 | Stop reason: HOSPADM

## 2018-08-21 RX ORDER — AMOXICILLIN 250 MG
2 CAPSULE ORAL 2 TIMES DAILY PRN
Status: DISCONTINUED | OUTPATIENT
Start: 2018-08-21 | End: 2018-08-23 | Stop reason: HOSPADM

## 2018-08-21 RX ORDER — OXYTOCIN/0.9 % SODIUM CHLORIDE 30/500 ML
PLASTIC BAG, INJECTION (ML) INTRAVENOUS
Status: COMPLETED
Start: 2018-08-21 | End: 2018-08-21

## 2018-08-21 RX ORDER — OXYTOCIN/0.9 % SODIUM CHLORIDE 30/500 ML
340 PLASTIC BAG, INJECTION (ML) INTRAVENOUS CONTINUOUS PRN
Status: DISCONTINUED | OUTPATIENT
Start: 2018-08-21 | End: 2018-08-23 | Stop reason: CLARIF

## 2018-08-21 RX ORDER — SODIUM CHLORIDE, SODIUM LACTATE, POTASSIUM CHLORIDE, CALCIUM CHLORIDE 600; 310; 30; 20 MG/100ML; MG/100ML; MG/100ML; MG/100ML
INJECTION, SOLUTION INTRAVENOUS CONTINUOUS
Status: DISCONTINUED | OUTPATIENT
Start: 2018-08-21 | End: 2018-08-21

## 2018-08-21 RX ORDER — ONDANSETRON 2 MG/ML
INJECTION INTRAMUSCULAR; INTRAVENOUS PRN
Status: DISCONTINUED | OUTPATIENT
Start: 2018-08-21 | End: 2018-08-21

## 2018-08-21 RX ORDER — BUPIVACAINE HYDROCHLORIDE 7.5 MG/ML
INJECTION, SOLUTION EPIDURAL; RETROBULBAR PRN
Status: DISCONTINUED | OUTPATIENT
Start: 2018-08-21 | End: 2018-08-21

## 2018-08-21 RX ORDER — PRENATAL VIT/IRON FUM/FOLIC AC 27MG-0.8MG
1 TABLET ORAL DAILY
Status: DISCONTINUED | OUTPATIENT
Start: 2018-08-21 | End: 2018-08-23 | Stop reason: HOSPADM

## 2018-08-21 RX ADMIN — SODIUM CHLORIDE, POTASSIUM CHLORIDE, SODIUM LACTATE AND CALCIUM CHLORIDE: 600; 310; 30; 20 INJECTION, SOLUTION INTRAVENOUS at 10:12

## 2018-08-21 RX ADMIN — BUPIVACAINE HYDROCHLORIDE AND EPINEPHRINE BITARTRATE 15 ML: 5; .005 INJECTION, SOLUTION PERINEURAL at 11:52

## 2018-08-21 RX ADMIN — CEFAZOLIN SODIUM 2 G: 2 INJECTION, SOLUTION INTRAVENOUS at 10:12

## 2018-08-21 RX ADMIN — OXYCODONE HYDROCHLORIDE 5 MG: 5 TABLET ORAL at 18:08

## 2018-08-21 RX ADMIN — OXYTOCIN-SODIUM CHLORIDE 0.9% IV SOLN 30 UNIT/500ML 500 ML: 30-0.9/5 SOLUTION at 11:04

## 2018-08-21 RX ADMIN — IBUPROFEN 600 MG: 600 TABLET ORAL at 23:52

## 2018-08-21 RX ADMIN — SODIUM CITRATE AND CITRIC ACID MONOHYDRATE 30 ML: 500; 334 SOLUTION ORAL at 10:14

## 2018-08-21 RX ADMIN — BUPIVACAINE HYDROCHLORIDE 2 ML: 7.5 INJECTION, SOLUTION EPIDURAL; RETROBULBAR at 10:42

## 2018-08-21 RX ADMIN — PHENYLEPHRINE HYDROCHLORIDE 100 MCG: 10 INJECTION, SOLUTION INTRAMUSCULAR; INTRAVENOUS; SUBCUTANEOUS at 10:44

## 2018-08-21 RX ADMIN — OXYTOCIN-SODIUM CHLORIDE 0.9% IV SOLN 30 UNIT/500ML 100 ML/HR: 30-0.9/5 SOLUTION at 12:40

## 2018-08-21 RX ADMIN — Medication 100 ML/HR: at 12:40

## 2018-08-21 RX ADMIN — SENNOSIDES AND DOCUSATE SODIUM 1 TABLET: 8.6; 5 TABLET ORAL at 21:03

## 2018-08-21 RX ADMIN — ONDANSETRON 4 MG: 2 INJECTION INTRAMUSCULAR; INTRAVENOUS at 10:44

## 2018-08-21 RX ADMIN — IBUPROFEN 600 MG: 600 TABLET ORAL at 17:37

## 2018-08-21 RX ADMIN — OXYCODONE HYDROCHLORIDE 5 MG: 5 TABLET ORAL at 21:03

## 2018-08-21 RX ADMIN — DEXAMETHASONE SODIUM PHOSPHATE 5 MG: 4 INJECTION, SOLUTION INTRAMUSCULAR; INTRAVENOUS at 11:49

## 2018-08-21 RX ADMIN — SODIUM CHLORIDE, POTASSIUM CHLORIDE, SODIUM LACTATE AND CALCIUM CHLORIDE 1000 ML: 600; 310; 30; 20 INJECTION, SOLUTION INTRAVENOUS at 09:53

## 2018-08-21 RX ADMIN — DEXAMETHASONE SODIUM PHOSPHATE 5 MG: 4 INJECTION, SOLUTION INTRAMUSCULAR; INTRAVENOUS at 11:44

## 2018-08-21 RX ADMIN — OXYCODONE HYDROCHLORIDE 5 MG: 5 TABLET ORAL at 14:50

## 2018-08-21 RX ADMIN — PHENYLEPHRINE HYDROCHLORIDE 0.4 MCG/KG/MIN: 10 INJECTION, SOLUTION INTRAMUSCULAR; INTRAVENOUS; SUBCUTANEOUS at 10:43

## 2018-08-21 RX ADMIN — SODIUM CHLORIDE, POTASSIUM CHLORIDE, SODIUM LACTATE AND CALCIUM CHLORIDE: 600; 310; 30; 20 INJECTION, SOLUTION INTRAVENOUS at 11:24

## 2018-08-21 RX ADMIN — FENTANYL CITRATE 25 MCG: 50 INJECTION, SOLUTION INTRAMUSCULAR; INTRAVENOUS at 10:42

## 2018-08-21 RX ADMIN — BUPIVACAINE HYDROCHLORIDE AND EPINEPHRINE BITARTRATE 15 ML: 5; .005 INJECTION, SOLUTION PERINEURAL at 11:44

## 2018-08-21 RX ADMIN — ACETAMINOPHEN 975 MG: 325 TABLET ORAL at 20:25

## 2018-08-21 RX ADMIN — OXYCODONE HYDROCHLORIDE 5 MG: 5 TABLET ORAL at 20:55

## 2018-08-21 RX ADMIN — METHYLERGONOVINE MALEATE 200 MCG: 0.2 INJECTION INTRAMUSCULAR; INTRAVENOUS at 11:07

## 2018-08-21 ASSESSMENT — ACTIVITIES OF DAILY LIVING (ADL)
TOILETING: 0-->INDEPENDENT
RETIRED_EATING: 0-->INDEPENDENT
DRESS: 0-->INDEPENDENT
BATHING: 0-->INDEPENDENT
RETIRED_COMMUNICATION: 0-->UNDERSTANDS/COMMUNICATES WITHOUT DIFFICULTY
SWALLOWING: 0-->SWALLOWS FOODS/LIQUIDS WITHOUT DIFFICULTY
FALL_HISTORY_WITHIN_LAST_SIX_MONTHS: NO
AMBULATION: 0-->INDEPENDENT
COGNITION: 0 - NO COGNITION ISSUES REPORTED
TRANSFERRING: 0-->INDEPENDENT

## 2018-08-21 NOTE — PROGRESS NOTES
S: triage arrival  B: 33 year old  at 36w2d gestation  A:  Pt presents to the birthplace today from the ED after talking with  assistant.  Pt started having pain under in the right upper quadrant, right below her rib cage last evening around 2100.  Nausea, chills. Pt states that the pain has come and gone all night.  The pain has now started radiating down across her whole stomach.  Pt also started getting a headache last evening.  She took tylenol which helped it but did not take it away.  Has noticed some visual changes as well-blurred vision which she thinks is from the headache.  Pt googled her symptoms and is concerned that she might have HELPP syndrome.  Pt states she has had swelling in her feet and ankles but that is not new for her.  No clonus today on exam.  B/P 101/63.  R: Will call our doctor on call for further orders.

## 2018-08-21 NOTE — PROGRESS NOTES
S:  Section Delivery  B: Repeat  Section @ 1102 for abdominal pain  A: Baby girl delivered, cord clamping delayed for 60 seconds, then brought to pre- warmed infant warmer. Infant stimulated and dried. Infant then brought to mother and placed skin to skin for bonding fo approx. 5 minute but baby became grunty and brought to warmer then to recovery room.  Apgars 7/8. Educated mother on expected feeding readiness cues and encouraged her to observe for feeding cues. Mother informed that breast feeding assistance would be provided.   R: Mother and baby bonding well. Anticipate first feed within the hour.

## 2018-08-21 NOTE — IP AVS SNAPSHOT
[facility]    [address]     [place] [address]    Phone:  [phone]                                       After Visit Summary   8/21/2018    Debbie Gage    MRN: [id number]           After Visit Summary Signature Page     I have received my discharge instructions, and my questions have been answered. I have discussed any challenges I see with this plan with the nurse or doctor.    ..........................................................................................................................................  Patient/Patient Representative Signature      ..........................................................................................................................................  Patient Representative Print Name and Relationship to Patient    ..................................................               ................................................  Date                                            Time    ..........................................................................................................................................  Reviewed by Signature/Title    ...................................................              ..............................................  Date                                                            Time

## 2018-08-21 NOTE — ANESTHESIA CARE TRANSFER NOTE
Patient: Debbie Gage    Procedure(s):   SECTION - Wound Class: II-Clean Contaminated    Diagnosis: H/O  section  Diagnosis Additional Information: No value filed.    Anesthesia Type:   Spinal, Periph. Nerve Block for postop pain     Note:  Airway :Room Air  Patient transferred to:PACU  Handoff Report: Identifed the Patient, Identified the Reponsible Provider, Reviewed the pertinent medical history, Discussed the surgical course, Reviewed Intra-OP anesthesia mangement and issues during anesthesia, Set expectations for post-procedure period and Allowed opportunity for questions and acknowledgement of understanding      Vitals: (Last set prior to Anesthesia Care Transfer)    CRNA VITALS  2018 1127 - 2018 1203      2018             SpO2: 100 %                Electronically Signed By: CHRISSY Bennett CRNA  2018  12:03 PM

## 2018-08-21 NOTE — IP AVS SNAPSHOT
MRN:[id number]                      After Visit Summary   8/21/2018    Debbie Gage    MRN: [id number]           Thank you!     Thank you for choosing [facility] for your care. Our goal is always to provide you with excellent care. Hearing back from our patients is one way we can continue to improve our services. Please take a few minutes to complete the written survey that you may receive in the mail after you visit with us. Thank you!        Patient Information     Date Of Birth          1985        Designated Caregiver       Most Recent Value    Caregiver    Will someone help with your care after discharge? no    Phone number of caregiver [phone]      About your hospital stay     You were admitted on:  August 21, 2018 You last received care in the:  [facility]    You were discharged on:  August 23, 2018       Who to Call     For medical emergencies, please call 911.  For non-urgent questions about your medical care, please call your primary care provider or clinic, [phone]  For questions related to your surgery, please call your surgery clinic        Attending Provider     Provider Specialty    Yeny Carlos MD [facility], Ranjith Enciso MD [facility]       Primary Care Provider Office Phone # Fax #    Shelli Sweeney CHRISSY Morrison [facility] [phone]      Your next 10 appointments already scheduled     Aug 27, 2018  1:40 PM CDT   ESTABLISHED PRENATAL with Ranjith King MD   [address] [id number] [phone]            Sep 04, 2018 10:30 AM CDT   ESTABLISHED PRENATAL with Ranjith King MD   [address] [id number] [phone]              Further instructions from your care team       Discharge Instructions for Dr King:        You will have  an appointment to see Dr King for your baby on Friday and he will look at your  section incision then. You do not need to schedule a separate appointment to see him then.      Notify us (or go to emergency room if after hours) if you develop heavy vaginal bleeding greater than a menstrual period, or fever over 100.5 degrees, or if the incision becomes red and/or looks infected, or if you have vomiting or increased pain.    You should abstain from intercourse for 6 weeks after surgery. Avoid bathing for 1 week but showers are ok. Limit lifting to 20 pounds for 12 full weeks after surgery. Avoid bending for several weeks after surgery. No driving for a minimum of two weeks, and only after that when you can put your foot on the brake pedal rapidly so you can react to stop car if necessary.         Call the clinic or if after hours call labor and delivery at [phone] if you have any other questions.       Ranjith King MD, FACOG, FAAFP              , OB/GYN  Department.        Pending Results     No orders found from 2018 to 2018.            Statement of Approval     Ordered          18 0717  I have reviewed and agree with all the recommendations and orders detailed in this document.  EFFECTIVE NOW     Approved and electronically signed by:  Ranjith King MD             Admission Information     Date & Time Provider Department Dept. Phone    2018 Ranjith King MD [facility] [phone]      Your Vitals Were     Blood Pressure Pulse Temperature Respirations Last Period Pulse Oximetry    97/55 72 97.3  F (36.3  C) (Oral) 18 12/10/2017 (Exact Date) 98%      Care EveryWhere ID     This is your Care EveryWhere ID. This could be used by other organizations to access your [facility] medical records  [id number]        Equal Access to Services     JOSE R MARTINEZ AH: osvaldo Lamas qaybta kaalmada  kevin jaramillodhruv britneyduane bello'aan ah. So [facility] [phone].    ATENCIÓN: Si alfonsola hari, tiene a hernandez disposición servicios gratuitos de asistencia lingüística. Hilda al [phone].    We comply with applicable federal civil rights laws and [place] laws. We do not discriminate on the basis of race, color, national origin, age, disability, sex, sexual orientation, or gender identity.               Review of your medicines      START taking        Dose / Directions    ferrous gluconate 324 (38 Fe) MG tablet   Commonly known as:  FERGON        Dose:  324 mg   Take 1 tablet (324 mg) by mouth daily (with breakfast)   Quantity:  60 tablet   Refills:  1       ibuprofen 600 MG tablet   Commonly known as:  ADVIL/MOTRIN        Dose:  600 mg   Take 1 tablet (600 mg) by mouth every 6 hours as needed for moderate pain   Quantity:  80 tablet   Refills:  1       oxyCODONE IR 5 MG tablet   Commonly known as:  ROXICODONE        Dose:  5-10 mg   Take 1-2 tablets (5-10 mg) by mouth every 6 hours as needed for moderate to severe pain   Quantity:  40 tablet   Refills:  0       senna-docusate 8.6-50 MG per tablet   Commonly known as:  SENOKOT-S;PERICOLACE        Dose:  1 tablet   Take 1 tablet by mouth 2 times daily as needed for constipation   Quantity:  80 tablet   Refills:  0         CONTINUE these medicines which have NOT CHANGED        Dose / Directions    prenatal multivitamin plus iron 27-0.8 MG Tabs per tablet        Dose:  1 tablet   Take 1 tablet by mouth daily   Refills:  0       TYLENOL PO        Refills:  0            Where to get your medicines      These medications were sent to [facility], [place] - [address]   [address] , [facility] [address]     Phone:  [phone]     ferrous gluconate 324 (38 Fe) MG tablet    ibuprofen 600 MG tablet    senna-docusate 8.6-50 MG per tablet         Some of these will need a paper prescription and others can be bought over the  counter. Ask your nurse if you have questions.     Bring a paper prescription for each of these medications     oxyCODONE IR 5 MG tablet                Protect others around you: Learn how to safely use, store and throw away your medicines at www.disposemymeds.org.        Information about OPIOIDS     PRESCRIPTION OPIOIDS: WHAT YOU NEED TO KNOW   We gave you an opioid (narcotic) pain medicine. It is important to manage your pain, but opioids are not always the best choice. You should first try all the other options your care team gave you. Take this medicine for as short a time (and as few doses) as possible.    Some activities can increase your pain, such as bandage changes or therapy sessions. It may help to take your pain medicine 30 to 60 minutes before these activities. Reduce your stress by getting enough sleep, working on hobbies you enjoy and practicing relaxation or meditation. Talk to your care team about ways to manage your pain beyond prescription opioids.    These medicines have risks:    DO NOT drive when on new or higher doses of pain medicine. These medicines can affect your alertness and reaction times, and you could be arrested for driving under the influence (DUI). If you need to use opioids long-term, talk to your care team about driving.    DO NOT operate heavy machinery    DO NOT do any other dangerous activities while taking these medicines.    DO NOT drink any alcohol while taking these medicines.     If the opioid prescribed includes acetaminophen, DO NOT take with any other medicines that contain acetaminophen. Read all labels carefully. Look for the word  acetaminophen  or  Tylenol.  Ask your pharmacist if you have questions or are unsure.    You can get addicted to pain medicines, especially if you have a history of addiction (chemical, alcohol or substance dependence). Talk to your care team about ways to reduce this risk.    All opioids tend to cause constipation. Drink plenty of water  and eat foods that have a lot of fiber, such as fruits, vegetables, prune juice, apple juice and high-fiber cereal. Take a laxative (Miralax, milk of magnesia, Colace, Senna) if you don t move your bowels at least every other day. Other side effects include upset stomach, sleepiness, dizziness, throwing up, tolerance (needing more of the medicine to have the same effect), physical dependence and slowed breathing.    Store your pills in a secure place, locked if possible. We will not replace any lost or stolen medicine. If you don t finish your medicine, please throw away (dispose) as directed by your pharmacist. The [place] Pollution [employer] has more information about safe disposal: https://www.pca.[facility].mn.us/living-green/managing-unwanted-medications             Medication List: This is a list of all your medications and when to take them. Check marks below indicate your daily home schedule. Keep this list as a reference.      Medications           Morning Afternoon Evening Bedtime As Needed    ferrous gluconate 324 (38 Fe) MG tablet   Commonly known as:  FERGON   Take 1 tablet (324 mg) by mouth daily (with breakfast)   Last time this was given:  324 mg on 8/23/2018  8:30 AM                                ibuprofen 600 MG tablet   Commonly known as:  ADVIL/MOTRIN   Take 1 tablet (600 mg) by mouth every 6 hours as needed for moderate pain   Last time this was given:  600 mg on 8/23/2018  6:58 AM                                oxyCODONE IR 5 MG tablet   Commonly known as:  ROXICODONE   Take 1-2 tablets (5-10 mg) by mouth every 6 hours as needed for moderate to severe pain   Last time this was given:  10 mg on 8/23/2018 10:02 AM                                prenatal multivitamin plus iron 27-0.8 MG Tabs per tablet   Take 1 tablet by mouth daily   Last time this was given:  1 tablet on 8/23/2018  8:30 AM                                senna-docusate 8.6-50 MG per tablet   Commonly known as:   SENOKOT-S;PERICOLACE   Take 1 tablet by mouth 2 times daily as needed for constipation   Last time this was given:  1 tablet on 8/21/2018  9:03 PM                                TYLENOL PO   Last time this was given:  975 mg on 8/23/2018  4:35 AM

## 2018-08-21 NOTE — ANESTHESIA PROCEDURE NOTES
Peripheral nerve/Neuraxial procedure note : TAP  Pre-Procedure    Location: post-op      Pre-Anesthestic Checklist: patient identified, IV checked, site marked, risks and benefits discussed, informed consent, monitors and equipment checked, pre-op evaluation, at physician/surgeon's request and post-op pain management    Timeout  Correct Patient: Yes   Correct Procedure: Yes   Correct Site: Yes   Correct Laterality: N/A   Correct Position: Yes   Site Marked: Yes   .   Procedure Documentation    .    Procedure:  bilateral  TAP.  Local skin infiltrated with 1 mL of 1% lidocaine.     Ultrasound used to identify targeted nerve, plexus, or vascular marker and placed a needle adjacent to it., Ultrasound was used to visualize the spread of the anesthetic in close proximity to the above stated nerve.   Patient Prep;mask, sterile gloves, chlorhexidine gluconate and isopropyl alcohol.  Nerve Stim: Initial Level 0.05 mA. Lowest motor response mA..  Needle: insulated Needle Gauge: 22.  Needle Length (millimeters) 100  Insertion Method: Single Shot.       Assessment/Narrative  Injection made incrementally with aspirations every 5 mL..  The placement was negative for: blood aspirated, painful injection and site bleeding.  Bolus given via needle..   Secured via.   Complications: none. Comments:  Pt tolerated procedure well. No complications noted.  Will follow if needed.

## 2018-08-21 NOTE — ANESTHESIA PROCEDURE NOTES
Peripheral nerve/Neuraxial procedure note : intrathecal  Pre-Procedure    Location: OR      Pre-Anesthestic Checklist: patient identified, IV checked, risks and benefits discussed, informed consent, monitors and equipment checked and pre-op evaluation    Timeout  Correct Patient: Yes   Correct Procedure: Yes   Correct Site: Yes   Correct Laterality: N/A   Correct Position: Yes   Site Marked: N/A   .   Procedure Documentation    .    Procedure:    Intrathecal.  Insertion Site:L3-4  (midline approach)      Patient Prep;mask, sterile gloves, povidone-iodine 7.5% surgical scrub, patient draped.  .  Needle: Herman tip Spinal Needle (gauge): 25  Spinal/LP Needle Length (inches): 3.5 # of attempts: 1 and # of redirects:  Introducer used Introducer: 20 G .       Assessment/Narrative  Paresthesias: No.  .  .  clear CSF fluid removed while sitting   . Comments:  Pt tolerated procedure well.  Had no paresthesias.

## 2018-08-21 NOTE — TELEPHONE ENCOUNTER
Patient called and left a message stating that she has been up all night with upper abdominal pain.  Per Dr. King she needs to go to the ED ASAP.  I told this to the patient, she stated she did not have anyone to take her and her  is 45 min away.  I stressed her the importance to get to the ED immediately and she stated she will get a ride.   Provider updated as to above conversation with patient.  He call the ED to give them background on patient and a heads up as to her coming in.  Inder Rich MA

## 2018-08-21 NOTE — TELEPHONE ENCOUNTER
Type of surgery:  Section  Location of surgery: [facility]  Date and time of surgery: 9/10/18  Surgeon: Dr. King, with Dr. Carlos Assisting  Pre-Op Appt Date:   Post-Op Appt Date:    Packet sent out: Yes  Pre-cert/Authorization completed:  Not Applicable  Date: 18  Inder Rich MA

## 2018-08-21 NOTE — ANESTHESIA PREPROCEDURE EVALUATION
Anesthesia Evaluation     . Pt has had prior anesthetic. Type: [facility] and [facility]           ROS/MED HX    ENT/Pulmonary:  - neg pulmonary ROS     Neurologic:  - neg neurologic ROS     Cardiovascular:  - neg cardiovascular ROS       METS/Exercise Tolerance:  >4 METS   Hematologic:  - neg hematologic  ROS       Musculoskeletal:  - neg musculoskeletal ROS       GI/Hepatic:  - neg GI/hepatic ROS       Renal/Genitourinary:  - ROS Renal section negative       Endo:  - neg endo ROS       Psychiatric:  - neg psychiatric ROS       Infectious Disease:  - neg infectious disease ROS       Malignancy:      - no malignancy   Other:    (+) No chance of pregnancy C-spine cleared: N/A, no H/O Chronic Pain,no other significant disability   - neg other ROS                 Physical Exam  Normal systems: cardiovascular, pulmonary and dental    Airway   Mallampati: I  TM distance: >3 FB  Neck ROM: full    Dental     Cardiovascular   Rhythm and rate: regular and normal      Pulmonary    breath sounds clear to auscultation                    Anesthesia Plan      History & Physical Review  History and physical reviewed and following examination; no interval change.    ASA Status:  2 .    NPO Status:  > 2 hours    Plan for Spinal and Periph. Nerve Block for postop pain with Other induction.   PONV prophylaxis:  Ondansetron (or other 5HT-3)       Postoperative Care      Consents                          .

## 2018-08-21 NOTE — H&P
Subjective: Debbie   Has EDC of    Which makes her gestation 36  weeks and 2 days  and this will serve as her admission History and Physical.      Chief Complaint/HPI or indication for admission: she has labor pains. They are centered in the right upper portion and top of her uterus. Initially described as RUQ pain since last night but in actuality the pain comes and goes and when she has the pain it is intense and it is timed perfectly with contractions seen on the monitor. She has a mild headache. No other complaints.      Past Medical History:   Diagnosis Date     Endometriosis      S/P LEEP (loop electrosurgical excision procedure)        No current outpatient prescriptions on file.       Family History   Problem Relation Age of Onset     Other Cancer Maternal Grandmother      ovarian     Diabetes Maternal Grandmother      Diabetes Maternal Grandfather      Colon Cancer Paternal Grandfather      Seasonal/Environmental Allergies Son      Allergy (Severe) Brother      dairy and shellfish     Asthma Brother        Social History   Substance Use Topics     Smoking status: Never Smoker     Smokeless tobacco: Current User     Alcohol use 0.0 oz/week     0 Standard drinks or equivalent per week      Comment: occas       Past Surgical History:   Procedure Laterality Date     AS ENLARGE BREAST WITH IMPLANT       silicone implants      SECTION      X2     CYSTECTOMY OVARIAN BENIGN       DILATION AND CURETTAGE, HYSTEROSCOPY, LAPAROSCOPY, COMBINED N/A 3/6/2017    Procedure: COMBINED DILATION AND CURETTAGE, HYSTEROSCOPY, LAPAROSCOPY;  Surgeon: Ranjith King MD;  Location: [facility] OR     ENT SURGERY      wisdom teeth extraction     GYN SURGERY      laparoscopy x 4     LAPAROSCOPIC ABLATION ENDOMETRIOSIS  3/6/2017    Procedure: LAPAROSCOPIC ABLATION ENDOMETRIOSIS;  Surgeon: Ranjith King MD;  Location: [facility] OR     LAPAROSCOPIC LYSIS ADHESIONS N/A 3/6/2017    Procedure: LAPAROSCOPIC LYSIS  ADHESIONS;  Surgeon: Ranjith King MD;  Location: [facility] OR     LEEP TX, CERVICAL  2010     ORTHOPEDIC SURGERY      ankle edwin surgery (tendon repair)         Review of Systems:        She reports daily fetal movement and no concerns.        All other systems review is negative.       Physical Exam:Vitals:  /63  Pulse 100  Temp 98.3  F (36.8  C) (Oral)  Resp 16  LMP 12/10/2017 (Exact Date)    HEENT is normal     Fundi are benign- disc margins are sharp. No papilledema noted.      Neck is supple, mobile, no adenoapthy or masses palpable. Normal range of motion noted.    Chest is clear to auscultation. No wheezes, rales or rhonchi heard. cardiac exam is normal with s1, s2, no murmurs or adventitious sounds.Normal rate and rhythm is heard.      Abdomen is soft,gravid,   nondistended, No masses felt.No HSM. No guarding or rigidity or rebound noted. However she winces in pain over the right lower ribcage so I wonder if she pulled a muscle there. The RUQ/lover is nontender to palpation but the top of the uterus IS tender to palpation, especially with a contraction.    Fundal height is appropriate to dates.  EFW: 5.5-6 pounds.    The fetus is vertex presentation by Leoplold's maneuvers.     Extremities are without significant edema.     Pelvic exam: The cervix is closed. Exam was done by [facility]-   RN        Fetal monitoring shows a reactive NST  , contractions Q   10 minutes,   Category 1   tracing.        Assessment:1. 33 year old female G 3  P 2002  At 36 2/7 weeks gestation, who presents for   Labor pains-history of 2 prior c sections.    2. We will check preeclampsia labs also because of the headache, but I think this is unlikely- she has no significant edema, and BP is normal.    3. Preop exam:  Based upon today's history and exam findings I believe that  she   is a good candidate for general anesthesia and is therefore CLEARED for general anesthesia if needed.  SPINAL AND TAP BLOCK PREFERRED.    We  discussed the plans for  section. She read over the consent form and signed it and we talked extensively about the risks. Risks include but are not limited to bleeding, infection, injury to bowel and bladder and other maternal organs as well as risk of injury to the fetus. If bleeding is excessive it might require transfusion or rarely a hysterectomy. She acknowledged understanding of the risks and signed the form. We did discuss the tubal ligation/salpingectomy  as an option at the same time and she does not  want that to be done.She knows to be NPO after midnight and to arrive two hours prior to surgery.She knows to have preop labs drawn ahead of time.           SRUTHI King MD

## 2018-08-22 LAB
HGB BLD-MCNC: 9.7 G/DL (ref 11.7–15.7)
T PALLIDUM AB SER QL: NONREACTIVE

## 2018-08-22 PROCEDURE — 12000029 ZZH R&B OB INTERMEDIATE

## 2018-08-22 PROCEDURE — 25000132 ZZH RX MED GY IP 250 OP 250 PS 637: Performed by: FAMILY MEDICINE

## 2018-08-22 PROCEDURE — 85018 HEMOGLOBIN: CPT | Performed by: OBSTETRICS & GYNECOLOGY

## 2018-08-22 PROCEDURE — 36415 COLL VENOUS BLD VENIPUNCTURE: CPT | Performed by: OBSTETRICS & GYNECOLOGY

## 2018-08-22 PROCEDURE — 25000132 ZZH RX MED GY IP 250 OP 250 PS 637: Performed by: OBSTETRICS & GYNECOLOGY

## 2018-08-22 RX ORDER — FERROUS GLUCONATE 324(38)MG
324 TABLET ORAL
Status: DISCONTINUED | OUTPATIENT
Start: 2018-08-22 | End: 2018-08-23 | Stop reason: HOSPADM

## 2018-08-22 RX ADMIN — ACETAMINOPHEN 975 MG: 325 TABLET ORAL at 04:37

## 2018-08-22 RX ADMIN — OXYCODONE HYDROCHLORIDE 10 MG: 5 TABLET ORAL at 21:24

## 2018-08-22 RX ADMIN — OXYCODONE HYDROCHLORIDE 10 MG: 5 TABLET ORAL at 17:26

## 2018-08-22 RX ADMIN — ACETAMINOPHEN 975 MG: 325 TABLET ORAL at 20:44

## 2018-08-22 RX ADMIN — FERROUS GLUCONATE 324 MG: 324 TABLET ORAL at 08:35

## 2018-08-22 RX ADMIN — Medication: at 08:56

## 2018-08-22 RX ADMIN — OXYCODONE HYDROCHLORIDE 10 MG: 5 TABLET ORAL at 08:34

## 2018-08-22 RX ADMIN — ACETAMINOPHEN 975 MG: 325 TABLET ORAL at 12:25

## 2018-08-22 RX ADMIN — OXYCODONE HYDROCHLORIDE 10 MG: 5 TABLET ORAL at 01:02

## 2018-08-22 RX ADMIN — IBUPROFEN 600 MG: 600 TABLET ORAL at 12:25

## 2018-08-22 RX ADMIN — OXYCODONE HYDROCHLORIDE 10 MG: 5 TABLET ORAL at 04:52

## 2018-08-22 RX ADMIN — IBUPROFEN 600 MG: 600 TABLET ORAL at 18:42

## 2018-08-22 RX ADMIN — OXYCODONE HYDROCHLORIDE 10 MG: 5 TABLET ORAL at 13:19

## 2018-08-22 RX ADMIN — PRENATAL VIT W/ FE FUMARATE-FA TAB 27-0.8 MG 1 TABLET: 27-0.8 TAB at 08:35

## 2018-08-22 RX ADMIN — IBUPROFEN 600 MG: 600 TABLET ORAL at 05:38

## 2018-08-22 NOTE — PLAN OF CARE
Problem: Patient Care Overview  Goal: Plan of Care/Patient Progress Review  Outcome: Improving  S: Shift review  B:Debbie is a , day 1 post  birth.  A: Stable post-op, incisional dressing is dry & intact ,Lung sounds-clear, bowel sounds active in all 4 quadrants, Pain is now being controlled with PO pain medications given as soon as available through the night. Handles baby with confidence. Does well with getting baby to feed, using nipple shield and is pumping after feedings.   R: Continue with plan of care. Offer pain meds routinely.

## 2018-08-22 NOTE — PROGRESS NOTES
Subjective: Debbie is now postopday # 1. She reports adequate pain control and no other c/o. She had postop lower incisional pain yesterday but this is much improved today. The upper pain has resolved.    Objective: VSS. BP (!) 88/51  Pulse 100  Temp 97.6  F (36.4  C) (Axillary)  Resp 16  LMP 12/10/2017 (Exact Date)  SpO2 98%  Breastfeeding? Unknown    Chest is clear to auscultation. No wheezes, rales or rhonchi heard. cardiac exam is normal with s1, s2, no murmurs or adventitious sounds.Normal rate and rhythm is heard.    Abdomen is soft and fundus is firm and not appreciably tender.    Extremities without edema.Incision is  still dressed.     HGB:pending    Assessment: 1.stable postop course on post op day # 1 from  section.        Plan: Hope to discharge in 1-2 days       SRUTHI King MD

## 2018-08-22 NOTE — PROGRESS NOTES
Pt is doing well, was up & showered without any difficulties, VSS stable.  Babb was removed & pt was able to void.  Encouraged to ambulate as able but also rest today.  Pt agreed to plan of care & has no questions.

## 2018-08-22 NOTE — PROGRESS NOTES
"I received a phone call from the nursing staff in regard to her pain.  Patient has been tearful and complaining of a lot of pain pain; nursing staffs feel it is more than usual and they concerned.  Patient stated that she feels the pain ever since she is out of the anesthesia.  The pain seemed to localize along the incision.  Is 7-8 out of 10.  She did not remember of having this much of the pain with her previous C-sections.  She received Astramorph and nerve block before leaving the OR.  Oxycodone 5 mg every 3 hours did not seem to help.  No fever.  Vital sign has been stable.    Patient is again stated the pain is more along the incision.  Normal flatulence.  No fever.  No chest pain or shortness of breath.  Minimal bleeding.  No other concern from the nursing staff and patient    Vital signs:  Temp: 97.8  F (36.6  C) Temp src: Axillary BP: 102/42 Pulse: 100 Heart Rate: 87 Resp: 18 SpO2: 97 % O2 Device: None (Room air)        Estimated body mass index is 26.13 kg/(m^2) as calculated from the following:    Height as of 8/20/18: 1.651 m (5' 5\").    Weight as of 8/20/18: 71.2 kg (157 lb).      Abdomen is nondistended.  Bowel sounds appreciated.  Fundus is firm    He was given an extra dose of oxycodone which helped the pain.  Pain now is tolerable at about 5/10.    Will continue with the current care.  Ice the area.  Increased oxycodone to 5-10 mg every 4 hours as needed.  Continue with the ibuprofen and Tylenol as needed.  Continue to monitor closely.    Assessments and plan discussed with patient and her  details.  All their questions were answered and they feel comfortable with the plan.    Yeny Carlos MD    "

## 2018-08-22 NOTE — ANESTHESIA POSTPROCEDURE EVALUATION
Patient: Debbie Gage    Procedure(s):   SECTION - Wound Class: II-Clean Contaminated    Diagnosis:H/O  section  Diagnosis Additional Information: No value filed.    Anesthesia Type:  Spinal, Periph. Nerve Block for postop pain    Note:  Anesthesia Post Evaluation    Patient location during evaluation: Bedside and Floor  Patient participation: Able to fully participate in evaluation  Level of consciousness: awake and alert  Pain management: satisfactory to patient  Airway patency: patent  Cardiovascular status: stable  Respiratory status: room air and spontaneous ventilation  Hydration status: stable  PONV: none     Anesthetic complications: None    Comments: Appear to tolerate spinal with bilateral TAP block  well without anesthesia related problems / complications noted.  Pain level satisfactory per patient. No N  /  V last night.  Relates full S&M have returned to BLE and has voided since her vick was DC'ed.  No complaints per patient.  Will follow as needed.        Last vitals:  Vitals:    18 0345 18 0800 18 1530   BP: (!) 88/51 (!) 82/51 105/54   Pulse:   65   Resp: 16 16 16   Temp:  97.2  F (36.2  C) 97.9  F (36.6  C)   SpO2:            Electronically Signed By: CHRISSY Rodrigez CRNA  2018  4:54 PM

## 2018-08-22 NOTE — PLAN OF CARE
Problem: Postpartum ( Delivery) (Adult,Obstetrics,Pediatric)  Goal: Signs and Symptoms of Listed Potential Problems Will be Absent, Minimized or Managed (Postpartum)  Signs and symptoms of listed potential problems will be absent, minimized or managed by discharge/transition of care (reference Postpartum ( Delivery) (Adult,Obstetrics,Pediatric) CPG).   Patient sobbing in pain, requesting pain medication. She did receive Astromorph and nerve block in OR and has received Ibuprofen & Oxycodone postpartum. Dr. Carlos was contacted and additional Oxycodone 5 mg dose times 1 and Tylenol 975 mg/8 hours PRN.

## 2018-08-23 VITALS
SYSTOLIC BLOOD PRESSURE: 97 MMHG | DIASTOLIC BLOOD PRESSURE: 55 MMHG | TEMPERATURE: 97.3 F | HEART RATE: 72 BPM | RESPIRATION RATE: 18 BRPM | OXYGEN SATURATION: 98 %

## 2018-08-23 PROCEDURE — 25000132 ZZH RX MED GY IP 250 OP 250 PS 637: Performed by: FAMILY MEDICINE

## 2018-08-23 PROCEDURE — 25000132 ZZH RX MED GY IP 250 OP 250 PS 637: Performed by: OBSTETRICS & GYNECOLOGY

## 2018-08-23 RX ORDER — FERROUS GLUCONATE 324(38)MG
324 TABLET ORAL
Qty: 60 TABLET | Refills: 1 | Status: SHIPPED | OUTPATIENT
Start: 2018-08-23 | End: 2018-10-23

## 2018-08-23 RX ORDER — AMOXICILLIN 250 MG
1 CAPSULE ORAL 2 TIMES DAILY PRN
Qty: 80 TABLET | Refills: 0 | Status: SHIPPED | OUTPATIENT
Start: 2018-08-23 | End: 2018-10-23

## 2018-08-23 RX ORDER — IBUPROFEN 600 MG/1
600 TABLET, FILM COATED ORAL EVERY 6 HOURS PRN
Qty: 80 TABLET | Refills: 1 | Status: ON HOLD | OUTPATIENT
Start: 2018-08-23 | End: 2022-01-24

## 2018-08-23 RX ORDER — OXYCODONE HYDROCHLORIDE 5 MG/1
5-10 TABLET ORAL EVERY 6 HOURS PRN
Qty: 40 TABLET | Refills: 0 | Status: SHIPPED | OUTPATIENT
Start: 2018-08-23 | End: 2018-10-23

## 2018-08-23 RX ADMIN — IBUPROFEN 600 MG: 600 TABLET ORAL at 06:58

## 2018-08-23 RX ADMIN — OXYCODONE HYDROCHLORIDE 10 MG: 5 TABLET ORAL at 05:32

## 2018-08-23 RX ADMIN — IBUPROFEN 600 MG: 600 TABLET ORAL at 00:58

## 2018-08-23 RX ADMIN — PRENATAL VIT W/ FE FUMARATE-FA TAB 27-0.8 MG 1 TABLET: 27-0.8 TAB at 08:30

## 2018-08-23 RX ADMIN — FERROUS GLUCONATE 324 MG: 324 TABLET ORAL at 08:30

## 2018-08-23 RX ADMIN — OXYCODONE HYDROCHLORIDE 10 MG: 5 TABLET ORAL at 10:02

## 2018-08-23 RX ADMIN — ACETAMINOPHEN 975 MG: 325 TABLET ORAL at 04:35

## 2018-08-23 RX ADMIN — OXYCODONE HYDROCHLORIDE 10 MG: 5 TABLET ORAL at 01:31

## 2018-08-23 NOTE — PLAN OF CARE
Problem: Postpartum ( Delivery) (Adult,Obstetrics,Pediatric)  Goal: Signs and Symptoms of Listed Potential Problems Will be Absent, Minimized or Managed (Postpartum)  Signs and symptoms of listed potential problems will be absent, minimized or managed by discharge/transition of care (reference Postpartum ( Delivery) (Adult,Obstetrics,Pediatric) CPG).   Outcome: Improving  S:Shift note  A: Pain controlled with scheduled pain meds. Independent with feeding baby: dropper feeding pumped milk, breastfeeding, then following up with formula as baby's blood glucose had been low. Mainly resting this evening.  R: Offer assistance as needed, continue to bring pain meds regularly.

## 2018-08-23 NOTE — DISCHARGE INSTRUCTIONS
Discharge Instructions for Dr King:        You will have an appointment to see Dr King for your baby on Friday and he will look at your  section incision then. You do not need to schedule a separate appointment to see him then.      Notify us (or go to emergency room if after hours) if you develop heavy vaginal bleeding greater than a menstrual period, or fever over 100.5 degrees, or if the incision becomes red and/or looks infected, or if you have vomiting or increased pain.    You should abstain from intercourse for 6 weeks after surgery. Avoid bathing for 1 week but showers are ok. Limit lifting to 20 pounds for 12 full weeks after surgery. Avoid bending for several weeks after surgery. No driving for a minimum of two weeks, and only after that when you can put your foot on the brake pedal rapidly so you can react to stop car if necessary.         Call the clinic or if after hours call labor and delivery at [phone] if you have any other questions.       Ranjith King MD, FACOG, FAAFP              , OB/GYN  Department.

## 2018-08-23 NOTE — PLAN OF CARE
Problem: Patient Care Overview  Goal: Plan of Care/Patient Progress Review  Outcome: Improving  vss to baseline. Pt states that overall pain is improving to abdominal incision. PRN oxycodone, Ibuprofen, and tylenol given. Pt has been up independently. Small lochia flow. Independent with breastfeeding though infant not interested and sucks for a few minutes. Has been pumping breastmilk and also supplementing with formula. Is confident with infant cares. Will continue with plan of care, assist with breastfeedings, pain control.

## 2018-08-23 NOTE — DISCHARGE SUMMARY
Obstetrical Discharge Summary:    Date of Admission: 18    Date of Discharge: 18    Hospital course:  This patient is a  33 year old female  3 Para 0202 who presented in active labor   and decision made  for  section   and delivered a 6 pound8 ounce .    female infant  without complications. By the day of discharge she was eating and voiding well, ambulating well and tolerating her pain well. Exam on day of discharge revealed stable vital signs: BP 92/53  Pulse 75  Temp 97.4  F (36.3  C) (Oral)  Resp 18  LMP 12/10/2017 (Exact Date)  SpO2 97%  Breastfeeding? Unknown, chest was clear to auscultation, cardiac exam was normal, abdomen was soft and nontender, fundus was firm, and the  incision was clean ,dry and intact  and so I felt she could be safely discharged.    Discharge medications: see med rec form    Activity: No intercourse for 6 weeks. Limit lifting to 10 pounds for 6 weeks. No baths for 2 weeks- may shower anytime.    Followup: within 1 week for  incision check   And In 6-8 weeks for postpartum check.   Recheck acutely if fever, heavy vaginal bleeding greater than menses, increased pain or any other concerns.    Discharge Diagnosis:   1.  Term pregnancy  ,labor, wishes repeat c section    2.Anemia, dilutional and acute blood loss- expected    Procedures: 1. Repeat low transverse   section        Complications: None     SRUTHI King MD

## 2018-08-23 NOTE — PLAN OF CARE
Problem: Patient Care Overview  Goal: Plan of Care/Patient Progress Review  Outcome: Adequate for Discharge Date Met: 18  S-(situation): Discharge  to home    B-/ A(background): Patient had a  delivery with no complications. Baby girl Baby's name Soledad, breast:  and expressed breast milk. Support person's name Pradeep VSS, incision pain tolerable with ibuprofen and oxycodone, and tylenol. No BM since delivery encouraged use of the senna, high fiber food and push po fluids. Pumping and giving pumped milk to infant , hoping to transition to BF solely when infant is a little older. Incision intact without redness or drainage.    A-(assessment): As above documented.    R-(recommendations): All Discharge instructions reviewed and questions answered.  Belongings gathered and returned to the patient. Agreed to follow up in 24 hours and 6  weeks or sooner with any question or concerns.     Nursing Discharge Checklist:    Pneumovax screened and given, if appropriate: N/A  Influenza vaccine screened and given, if appropriate: N/A  Staples removed (): N/A  Breast milk returned: N/A  Hydrogel pads sent home:N/A  Birth Certificate Done: YES

## 2018-08-24 NOTE — OP NOTE
[facility] Obstetrics Operative Note  Surgeon: Ranjith King MD  Assistant: Yeny Carlos MD  (The assistance of this surgeon was required due to the need for additional skilled surgical hands to retract and hold instruments due to the nature of the surgical procedure and risk of complications)      PREOPERATIVE DIAGNOSIS:   Labor,  Wishes Repeat Cesarian section        POSTOPERATIVE DIAGNOSIS:  Same     PROCEDURE:   Repeat low transverse  section     ANESTHESIA:  Spinal Anesthesia         OPERATIVE FINDINGS: We delivered afemale   infant - Apgars and weight pending    OPERATIVE DESCRIPTION:  After obtaining informed consent and after the establishment of satisfactory anesthesia, the patient was prepped and draped in the usual fashion for  section.  A Babb catheter was inserted in the urinary bladder.     A Pfannenstiel incision was made through the skin and carried down to the rectus fascia which was scored in the midline and then extended bilaterally with Sheridan scissors.  The fascia was then  from the underlying rectus muscles sharply and bluntly and then the muscles were divided in the midline.  The peritoneum was then entered bluntly with a gloved finger and the incision was extended under direct visualization. My partner Dr. Carlos assisted me in this process by holding the tissues in such a way to allow me adequate visualization and also helped with the cutting of the tissues.. This assistance was vital to the success of the surgery.   The bladder blade was then positioned.  The bladder flap was then mobilized using Metzenbaum scissors and the bladder blade was then repositioned.  A low transverse incision was made in the lower uterine segment and smiled gently upwards, and the last layer was made bluntly with gloved finger to avoid trauma to the fetus.    The vertex was then lifted through the uterine scar atraumatically and the mouth and nostrils were bulb suctioned.  The body was  then delivered atraumatically and the cord was double clamped and cut and the infant was taken to the warmer for observation.  See operative findings above.     The placenta was manually removed.  The uterine cavity was curetted with a moist lap sponge and all placental fragments and membranes removed.  There was some uterine atony observed initially and so the patient was given 0.2 mg Im methergine which resulted in improved uterine tone.  The uterus was exteriorized and the uterine incision was repaired with 0 Vicryl running locked suture and re-imbricated with 0 Vicryl.  After hemostasis was assured, the uterus was replaced in to the abdominal cavity and the paracolic gutters were irrigated and clots removed.  The uterine incision was examined again and found to be hemostatic.    The peritoneum was repaired with 2-0 Vicryl.  The rectus fascia was repaired with running 0 Vicryl.  The subcutaneous tissues were irrigated and found to be hemostatic.    The skin was closed with running 4-0 monocryl subcuticular sutures on a Jose needle.     The wound was then dressed.  Final sponge, needle and instrument counts were reported to me as correct.  Estimated blood loss was 800 cc.  The urine remained clear throughout the operation and into recovery.   Mom and infant were taken to the Recovery Room in satisfactory condition and there were no complications.     Ranjith King M.D.

## 2018-08-26 NOTE — PROGRESS NOTES
"Debbie Gage  Gender: female  : 1985  [address] [place] [id number] [phone] (home)   Medical Record: [id number]  Primary Care Provider: Shelli Morrison       [facility]   ?   Discharge Phone Call: Key Words/Key Times     How are you and the baby? good    How are feedings going? good    Voiding & Stooling? good    Any questions or concerns? no    Follow-up appointment? \"Yes we have an appt made for her.\"      We want to provide excellent care here at [facility]. Do you have any feedback for us that would help us improve?     Call back COMMENTS:         Attempted Calls:   ____L/M ___  18 1248 discharge callback completed per ANGI Martínez RN__     __________  "

## 2018-08-28 NOTE — ADDENDUM NOTE
Addendum  created 08/28/18 1806 by Rocio Jurado APRN CRNA    Anesthesia Intra Blocks edited, Sign clinical note

## 2018-10-23 ENCOUNTER — PRENATAL OFFICE VISIT (OUTPATIENT)
Dept: FAMILY MEDICINE | Facility: CLINIC | Age: 33
End: 2018-10-23
Payer: COMMERCIAL

## 2018-10-23 VITALS
SYSTOLIC BLOOD PRESSURE: 104 MMHG | DIASTOLIC BLOOD PRESSURE: 70 MMHG | OXYGEN SATURATION: 98 % | HEART RATE: 75 BPM | TEMPERATURE: 98.3 F | BODY MASS INDEX: 22.99 KG/M2 | HEIGHT: 65 IN | WEIGHT: 138 LBS | RESPIRATION RATE: 16 BRPM

## 2018-10-23 DIAGNOSIS — Z23 NEED FOR PROPHYLACTIC VACCINATION AND INOCULATION AGAINST INFLUENZA: ICD-10-CM

## 2018-10-23 PROCEDURE — 99207 ZZC POST PARTUM EXAM: CPT | Mod: 25 | Performed by: OBSTETRICS & GYNECOLOGY

## 2018-10-23 PROCEDURE — 90471 IMMUNIZATION ADMIN: CPT | Performed by: OBSTETRICS & GYNECOLOGY

## 2018-10-23 PROCEDURE — 90686 IIV4 VACC NO PRSV 0.5 ML IM: CPT | Performed by: OBSTETRICS & GYNECOLOGY

## 2018-10-23 ASSESSMENT — PAIN SCALES - GENERAL: PAINLEVEL: NO PAIN (0)

## 2018-10-23 NOTE — PROGRESS NOTES

## 2018-10-23 NOTE — MR AVS SNAPSHOT
"              After Visit Summary   10/23/2018    Debbie Gage    MRN: [id number]           Patient Information     Date Of Birth          1985        Visit Information        Provider Department      10/23/2018 9:10 AM Ranjith King MD [facility]        Today's Diagnoses     Routine postpartum follow-up    -  1    Need for prophylactic vaccination and inoculation against influenza           Follow-ups after your visit        Follow-up notes from your care team     Return in about 3 months (around 1/23/2019) for Routine Visit.      Who to contact     If you have questions or need follow up information about today's clinic visit or your schedule please contact [facility] directly at [phone].  Normal or non-critical lab and imaging results will be communicated to you by MyChart, letter or phone within 4 business days after the clinic has received the results. If you do not hear from us within 7 days, please contact the clinic through MyChart or phone. If you have a critical or abnormal lab result, we will notify you by phone as soon as possible.  Submit refill requests through [facility] or call your pharmacy and they will forward the refill request to us. Please allow 3 business days for your refill to be completed.          Additional Information About Your Visit        MyChart Information     [facility] lets you send messages to your doctor, view your test results, renew your prescriptions, schedule appointments and more. To sign up, go to [email or web address] . Click on \"Log in\" on the left side of the screen, which will take you to the Welcome page. Then click on \"Sign up Now\" on the right side of the page.     You will be asked to enter the access code listed below, as well as some personal information. Please follow the directions to create your username and password.     Your access code is: [email or web address]  Expires: 1/21/2019 10:04 AM     Your access code " "will  in 90 days. If you need help or a new code, please call your [facility] clinic or [phone].        Care EveryWhere ID     This is your Care EveryWhere ID. This could be used by other organizations to access your [facility] medical records  [id number]        Your Vitals Were     Pulse Temperature Respirations Height Last Period Pulse Oximetry    75 98.3  F (36.8  C) (Temporal) 16 5' 5\" (1.651 m) 12/10/2017 (Exact Date) 98%    Breastfeeding? BMI (Body Mass Index)                No 22.96 kg/m2           Blood Pressure from Last 3 Encounters:   10/23/18 104/70   18 97/55   18 104/68    Weight from Last 3 Encounters:   10/23/18 138 lb (62.6 kg)   18 157 lb (71.2 kg)   18 154 lb 14.4 oz (70.3 kg)              We Performed the Following     FLU VACCINE, SPLIT VIRUS, IM (QUADRIVALENT) [66177]- >3 YRS     Vaccine Administration, Initial [15591]        Primary Care Provider Office Phone # Fax #    CHRISSY [facility] [phone] [address] [place] [address]        Equal Access to Services     JOSE R MARTINEZ AH: Hadii adrian ku hadasho Soomaali, waaxda luqadaha, qaybta kaalmada adeegyada, waxay glory gerber. So [facility] [phone].    ATENCIÓN: Si habla español, tiene a hernandez disposición servicios gratuitos de asistencia lingüística. Llame al [phone].    We comply with applicable federal civil rights laws and [place] laws. We do not discriminate on the basis of race, color, national origin, age, disability, sex, sexual orientation, or gender identity.            Thank you!     Thank you for choosing [facility]  for your care. Our goal is always to provide you with excellent care. Hearing back from our patients is one way we can continue to improve our services. Please take a few minutes to complete the written survey that you may receive in the mail after your visit with us. Thank you!             Your Updated Medication " List - Protect others around you: Learn how to safely use, store and throw away your medicines at www.disposemymeds.org.          This list is accurate as of 10/23/18 10:04 AM.  Always use your most recent med list.                   Brand Name Dispense Instructions for use Diagnosis    ibuprofen 600 MG tablet    ADVIL/MOTRIN    80 tablet    Take 1 tablet (600 mg) by mouth every 6 hours as needed for moderate pain    S/P  section       TYLENOL PO

## 2018-10-23 NOTE — PROGRESS NOTES
History: Debbie is here for postpartum exam. her delivery was  by  section . She reports feeling well. Normal bowel and bladder function and no complaints.     phq9 score:0    Past Medical History:   Diagnosis Date     Endometriosis      S/P LEEP (loop electrosurgical excision procedure)       Current Outpatient Prescriptions   Medication Sig Dispense Refill     Acetaminophen (TYLENOL PO)        ibuprofen (ADVIL/MOTRIN) 600 MG tablet Take 1 tablet (600 mg) by mouth every 6 hours as needed for moderate pain (Patient not taking: Reported on 10/23/2018) 80 tablet 1      Allergies   Allergen Reactions     Sulfa Drugs Hives      History   Smoking Status     Never Smoker   Smokeless Tobacco     Current User      Past Surgical History:   Procedure Laterality Date     AS ENLARGE BREAST WITH IMPLANT       silicone implants      SECTION      X2      SECTION N/A 2018    Procedure:  SECTION;   SECTION;  Surgeon: Ranjith King MD;  Location: [facility] L+D     CYSTECTOMY OVARIAN BENIGN       DILATION AND CURETTAGE, HYSTEROSCOPY, LAPAROSCOPY, COMBINED N/A 3/6/2017    Procedure: COMBINED DILATION AND CURETTAGE, HYSTEROSCOPY, LAPAROSCOPY;  Surgeon: Ranjith King MD;  Location: [facility] OR     ENT SURGERY      wisdom teeth extraction     GYN SURGERY      laparoscopy x 4     LAPAROSCOPIC ABLATION ENDOMETRIOSIS  3/6/2017    Procedure: LAPAROSCOPIC ABLATION ENDOMETRIOSIS;  Surgeon: Ranjith King MD;  Location: [facility] OR     LAPAROSCOPIC LYSIS ADHESIONS N/A 3/6/2017    Procedure: LAPAROSCOPIC LYSIS ADHESIONS;  Surgeon: Ranjith King MD;  Location: [facility] OR     LEEP TX, CERVICAL       ORTHOPEDIC SURGERY      ankle edwin surgery (tendon repair)      Social History   Substance Use Topics     Smoking status: Never Smoker     Smokeless tobacco: Current User     Alcohol use 0.0 oz/week     0 Standard drinks or equivalent per week      Comment: occas     "  Family History   Problem Relation Age of Onset     Other Cancer Maternal Grandmother      ovarian     Diabetes Maternal Grandmother      Diabetes Maternal Grandfather      Colon Cancer Paternal Grandfather      Seasonal/Environmental Allergies Son      Allergy (Severe) Brother      dairy and shellfish     Asthma Brother       Review Of Systems  Skin: negative  Eyes: negative  Ears/Nose/Throat: negative  Respiratory: No shortness of breath, dyspnea on exertion, cough, or hemoptysis  Cardiovascular: negative  Gastrointestinal: negative  Genitourinary: negative  Musculoskeletal: negative  Neurologic: negative  Psychiatric: negative  Hematologic/Lymphatic/Immunologic: negative  Endocrine: negative      Physical Exam: Vitals:  Blood pressure 104/70, pulse 75, temperature 98.3  F (36.8  C), temperature source Temporal, resp. rate 16, height 5' 5\" (1.651 m), weight 138 lb (62.6 kg), last menstrual period 12/10/2017, SpO2 98 %, not currently breastfeeding.      Exam was done with my nurse present.  HEENT:    Sclerae and conjunctiva are normal.   Ear canals and TMs look normal.  Nasal mucosa is pink  - no polyps or masses seen.  sinuses are non tender to palpation.  Throat is unremarkable . Mucous membranes are moist.   Neck is supple, mobile, no adenopathy or masses palpable. The thyroid feels normal.   Normal range of motion noted.  Chest is clear to auscultation.  No wheezes, rales or rhonchi heard.  Cardiac exam is normal with s1, s2, no murmurs or adventitious sounds.Normal rate and rhythm is heard.   Abdomen is soft,  nondistended, No masses felt.No HSM. No guarding or rigidity or rebound   noted. Palpation reveals  no    tenderness   Normal bowel sounds heard.   Breast exam was deferred because she is nursing. She is advised to get this done when nursing finishes.    Pelvic exam: deferred- because she wants Mirena IUD but we decided to wait until she has her menses. She is using other contraceptive protection until " then.    Assessment: normal postpartum exam.     Plan: Birth control was discussed. See above.  Recheck in 3 months, sooner if concerns arise.  SRUTHI King MD

## 2018-10-24 ASSESSMENT — PATIENT HEALTH QUESTIONNAIRE - PHQ9: SUM OF ALL RESPONSES TO PHQ QUESTIONS 1-9: 0

## 2018-11-14 ENCOUNTER — OFFICE VISIT (OUTPATIENT)
Dept: FAMILY MEDICINE | Facility: CLINIC | Age: 33
End: 2018-11-14
Payer: COMMERCIAL

## 2018-11-14 VITALS
OXYGEN SATURATION: 98 % | HEART RATE: 88 BPM | RESPIRATION RATE: 15 BRPM | DIASTOLIC BLOOD PRESSURE: 64 MMHG | TEMPERATURE: 98.6 F | WEIGHT: 138.38 LBS | SYSTOLIC BLOOD PRESSURE: 112 MMHG | BODY MASS INDEX: 23.03 KG/M2

## 2018-11-14 DIAGNOSIS — I80.01 THROMBOPHLEBITIS OF SUPERFICIAL VEINS OF RIGHT LOWER EXTREMITY: Primary | ICD-10-CM

## 2018-11-14 PROCEDURE — 99213 OFFICE O/P EST LOW 20 MIN: CPT | Performed by: FAMILY MEDICINE

## 2018-11-14 ASSESSMENT — PAIN SCALES - GENERAL: PAINLEVEL: MODERATE PAIN (4)

## 2018-11-14 NOTE — MR AVS SNAPSHOT
After Visit Summary   11/14/2018    Debbie Gage    MRN: [id number]           Patient Information     Date Of Birth          1985        Visit Information        Provider Department      11/14/2018 6:00 PM Stanislaw Holloway MD [facility]        Today's Diagnoses     Thrombophlebitis of superficial veins of right lower extremity    -  1      Care Instructions      Superficial Thrombophlebitis   The superficial veins are the veins near the surface of the skin. Superficial thrombophlebitis is a problem that occurs when one or more of these veins become red, irritated, and swollen. This is most often because of a blood clot.  Causes  The problem may occur after injury to a vein. It may also occur after having an intravenous (IV) line placed. Other factors that can make the problem more likely include:    Varicose veins    Venous insufficiency    Bleeding disorders    Prolonged periods of rest and not moving around    IV drug abuse    Pregnancy    Use of birth control pills or estrogen therapy  Symptoms  Symptoms may appear in the affected area. They can include:    Pain    Tenderness    Redness    Warmth    Swelling    Hardening of the vein  In most cases, superficial thrombophlebitis resolves on its own with no problems. Treatment is focused on relieving symptoms.  Sometimes, there is a risk that the deep veins in the body may also be involved. This can lead to more serious problems. In such cases, further testing and treatments may be needed. Your healthcare provider can tell you more about this.  Home care  To help relieve pain and swelling, you may be told to:    Apply heat or cold to the affected area. Do this for up to 10 minutes as often as directed.  ? Heat: Use a warm compress, such as a heating pad.  ? Cold: Use a cold compress, such as a cold pack or bag of ice wrapped in a thin towel.    Take nonsteroidal anti-inflammatory drugs (NSAIDS), such as ibuprofen. In some cases,  other pain medicines may be prescribed.    Keep the affected limb (arm or leg) raised above heart level as directed.    Wear elastic compression stockings or bandages as directed.    Don't sit or stand for long periods. Get up and walk often.  To help treat a blood clot, a blood thinner (anticoagulant) may be prescribed. If this is needed, be sure to take the medicine exactly as directed.  Follow-up care  Follow up with your healthcare provider as advised. If imaging tests are done, they will be reviewed by a doctor. You ll be told the results and any new findings that may affect your treatment.  When to seek medical advice  Call your healthcare provider right away if any of these occur:    Fever of 100.4 F (38 C) or higher, or as directed by your healthcare provider    Increasing pain, swelling, or tenderness in the affected area    Spreading warmth or redness in the affected area  Call 911  Call 911 if any of these occur:    Trouble breathing    Chest pain or discomfort that worsens with deep breathing or coughing    Coughing (may cough up blood)    Fast or irregular heartbeat    Sweating    Anxiety    Lightheadedness, dizziness, or fainting    Extreme confusion    Extreme drowsiness or trouble waking up    New pain in the chest, arm, shoulder, neck, or upper back   Date Last Reviewed: 4/1/2018 2000-2018 [employer]. [address] [place] [address]. All rights reserved. This information is not intended as a substitute for professional medical care. Always follow your healthcare professional's instructions.                Follow-ups after your visit        Follow-up notes from your care team     Return in about 2 weeks (around 11/28/2018) for recheck thrombophlebitis.      Who to contact     If you have questions or need follow up information about today's clinic visit or your schedule please contact [facility] directly at [phone].  Normal or non-critical lab and  "imaging results will be communicated to you by MyChart, letter or phone within 4 business days after the clinic has received the results. If you do not hear from us within 7 days, please contact the clinic through [employer] or phone. If you have a critical or abnormal lab result, we will notify you by phone as soon as possible.  Submit refill requests through [employer] or call your pharmacy and they will forward the refill request to us. Please allow 3 business days for your refill to be completed.          Additional Information About Your Visit        [employer] Information     [employer] lets you send messages to your doctor, view your test results, renew your prescriptions, schedule appointments and more. To sign up, go to [email or web address] . Click on \"Log in\" on the left side of the screen, which will take you to the Welcome page. Then click on \"Sign up Now\" on the right side of the page.     You will be asked to enter the access code listed below, as well as some personal information. Please follow the directions to create your username and password.     Your access code is: [email or web address]  Expires: 2019  9:04 AM     Your access code will  in 90 days. If you need help or a new code, please call your [facility] clinic or [phone].        Care EveryWhere ID     This is your Care EveryWhere ID. This could be used by other organizations to access your [facility] medical records  [id number]        Your Vitals Were     Pulse Temperature Respirations Last Period Pulse Oximetry Breastfeeding?    88 98.6  F (37  C) (Tympanic) 15 12/10/2017 (Exact Date) 98% Yes    BMI (Body Mass Index)                   23.03 kg/m2            Blood Pressure from Last 3 Encounters:   18 112/64   10/23/18 104/70   18 97/55    Weight from Last 3 Encounters:   18 138 lb 6 oz (62.8 kg)   10/23/18 138 lb (62.6 kg)   18 157 lb (71.2 kg)              Today, you had the following     No orders found for display    "    Primary Care Provider Office Phone # Fax #    Shelli Morrison, CHRISSY RADHA [phone] [address] [place] [address]        Equal Access to Services     JOSE R MARTINEZ : Hadii aad ku hadlazaroo Soharleyali, waaxda luqadaha, qaybta kaalmada adechasityda, kevin seaman sheadhruv huitron cori gerber. So [facility] [phone].    ATENCIÓN: Si habla español, tiene a hernandez disposición servicios gratuitos de asistencia lingüística. Llame al [phone].    We comply with applicable federal civil rights laws and [place] laws. We do not discriminate on the basis of race, color, national origin, age, disability, sex, sexual orientation, or gender identity.            Thank you!     Thank you for choosing [facility]  for your care. Our goal is always to provide you with excellent care. Hearing back from our patients is one way we can continue to improve our services. Please take a few minutes to complete the written survey that you may receive in the mail after your visit with us. Thank you!             Your Updated Medication List - Protect others around you: Learn how to safely use, store and throw away your medicines at www.disposemymeds.org.          This list is accurate as of 18  6:31 PM.  Always use your most recent med list.                   Brand Name Dispense Instructions for use Diagnosis    ibuprofen 600 MG tablet    ADVIL/MOTRIN    80 tablet    Take 1 tablet (600 mg) by mouth every 6 hours as needed for moderate pain    S/P  section

## 2018-11-15 NOTE — PROGRESS NOTES
SUBJECTIVE:   Debbie Gage is a 33 year old female who presents to clinic today for the following health issues:      Chief Complaint   Patient presents with     Varicose Vein     x3m rt lower calf- towards the inside. She has a bulging vein that is painful. Constant dull ache. She recently had a baby.              Problem list and histories reviewed & adjusted, as indicated.  Additional history: She has history of superficial phlebitis and VV since her 2nd pregnancy. She delivered her 3rd baby by [facility] 3 months ago. No history of DVT. She is non smoker. No family history for DVT or clots.     Patient Active Problem List   Diagnosis     Endometriosis     S/P LEEP of cervix     Diarrhea     Encounter for triage in pregnant patient     S/P  section     History of kidney stones     Past Surgical History:   Procedure Laterality Date     AS ENLARGE BREAST WITH IMPLANT       silicone implants      SECTION      X2      SECTION N/A 2018    Procedure:  SECTION;   SECTION;  Surgeon: Ranjith King MD;  Location: [facility] L[facility]D     CYSTECTOMY OVARIAN BENIGN       DILATION AND CURETTAGE, HYSTEROSCOPY, LAPAROSCOPY, COMBINED N/A 3/6/2017    Procedure: COMBINED DILATION AND CURETTAGE, HYSTEROSCOPY, LAPAROSCOPY;  Surgeon: Ranjith King MD;  Location: [facility] OR     ENT SURGERY      wisdom teeth extraction     GYN SURGERY      laparoscopy x 4     LAPAROSCOPIC ABLATION ENDOMETRIOSIS  3/6/2017    Procedure: LAPAROSCOPIC ABLATION ENDOMETRIOSIS;  Surgeon: Ranjith King MD;  Location: [facility] OR     LAPAROSCOPIC LYSIS ADHESIONS N/A 3/6/2017    Procedure: LAPAROSCOPIC LYSIS ADHESIONS;  Surgeon: Ranjith King MD;  Location: [facility] OR     LEEP TX, CERVICAL       ORTHOPEDIC SURGERY      ankle edwin surgery (tendon repair)       Social History   Substance Use Topics     Smoking status: Never Smoker     Smokeless tobacco: Current User     Alcohol use 1.2 oz/week      0 Standard drinks or equivalent, 2 Glasses of wine per week      Comment: occas     Family History   Problem Relation Age of Onset     Other Cancer Maternal Grandmother      ovarian     Diabetes Maternal Grandmother      Diabetes Maternal Grandfather      Colon Cancer Paternal Grandfather      Seasonal/Environmental Allergies Son      Allergy (Severe) Brother      dairy and shellfish     Asthma Brother          Current Outpatient Prescriptions   Medication Sig Dispense Refill     ibuprofen (ADVIL/MOTRIN) 600 MG tablet Take 1 tablet (600 mg) by mouth every 6 hours as needed for moderate pain (Patient not taking: Reported on 10/23/2018) 80 tablet 1     Allergies   Allergen Reactions     Sulfa Drugs Hives     Recent Labs   Lab Test  08/21/18   0948  06/25/18   1350  02/26/18   1426  01/13/18   1750   02/08/17   0951   LDL   --    --    --    --    --   73   HDL   --    --    --    --    --   60   TRIG   --    --    --    --    --   46   ALT  22  29   --   13   < >   --    CR   --   0.53  0.51*  0.54   < >   --    GFRESTIMATED   --   >90  >90  >90   < >   --    GFRESTBLACK   --   >90  >90  >90   < >   --    POTASSIUM   --   3.4  3.7  3.6   < >   --     < > = values in this interval not displayed.      BP Readings from Last 3 Encounters:   11/14/18 112/64   10/23/18 104/70   08/23/18 97/55    Wt Readings from Last 3 Encounters:   11/14/18 138 lb 6 oz (62.8 kg)   10/23/18 138 lb (62.6 kg)   08/20/18 157 lb (71.2 kg)                  Labs reviewed in EPIC    Reviewed and updated as needed this visit by clinical staff  Tobacco  Allergies  Meds  Problems  Soc Hx      Reviewed and updated as needed this visit by Provider  Allergies  Meds  Problems         ROS:  Constitutional, HEENT, cardiovascular, pulmonary, gi and gu systems are negative, except as otherwise noted.    OBJECTIVE:     /64  Pulse 88  Temp 98.6  F (37  C) (Tympanic)  Resp 15  Wt 138 lb 6 oz (62.8 kg)  LMP 12/10/2017 (Exact Date)  SpO2  98%  Breastfeeding? Yes  BMI 23.03 kg/m2  Body mass index is 23.03 kg/(m^2).  GENERAL: healthy, alert and no distress  NECK: no adenopathy, no asymmetry, masses, or scars and thyroid normal to palpation  RESP: lungs clear to auscultation - no rales, rhonchi or wheezes  CV: regular rate and rhythm, normal S1 S2, no S3 or S4, no murmur, click or rub, no peripheral edema and peripheral pulses strong  ABDOMEN: soft, nontender, no hepatosplenomegaly, no masses and bowel sounds normal  MS: varicose veins superficial right medial calf, compressible with tenderness. No erythema  and no edema    Diagnostic Test Results:  none     ASSESSMENT/PLAN:     1. Thrombophlebitis of superficial veins of right lower extremity  Chronic superficial varicose veins with acute superficial thrombophlebitis. Warm packs, NSAID, compression stocking for comfort.        FUTURE APPOINTMENTS:       - Follow-up visit in 2 weeks if not improving       - Follow-up for annual visit or as needed  Patient Instructions       Superficial Thrombophlebitis   The superficial veins are the veins near the surface of the skin. Superficial thrombophlebitis is a problem that occurs when one or more of these veins become red, irritated, and swollen. This is most often because of a blood clot.  Causes  The problem may occur after injury to a vein. It may also occur after having an intravenous (IV) line placed. Other factors that can make the problem more likely include:    Varicose veins    Venous insufficiency    Bleeding disorders    Prolonged periods of rest and not moving around    IV drug abuse    Pregnancy    Use of birth control pills or estrogen therapy  Symptoms  Symptoms may appear in the affected area. They can include:    Pain    Tenderness    Redness    Warmth    Swelling    Hardening of the vein  In most cases, superficial thrombophlebitis resolves on its own with no problems. Treatment is focused on relieving symptoms.  Sometimes, there is a risk  that the deep veins in the body may also be involved. This can lead to more serious problems. In such cases, further testing and treatments may be needed. Your healthcare provider can tell you more about this.  Home care  To help relieve pain and swelling, you may be told to:    Apply heat or cold to the affected area. Do this for up to 10 minutes as often as directed.  ? Heat: Use a warm compress, such as a heating pad.  ? Cold: Use a cold compress, such as a cold pack or bag of ice wrapped in a thin towel.    Take nonsteroidal anti-inflammatory drugs (NSAIDS), such as ibuprofen. In some cases, other pain medicines may be prescribed.    Keep the affected limb (arm or leg) raised above heart level as directed.    Wear elastic compression stockings or bandages as directed.    Don't sit or stand for long periods. Get up and walk often.  To help treat a blood clot, a blood thinner (anticoagulant) may be prescribed. If this is needed, be sure to take the medicine exactly as directed.  Follow-up care  Follow up with your healthcare provider as advised. If imaging tests are done, they will be reviewed by a doctor. You ll be told the results and any new findings that may affect your treatment.  When to seek medical advice  Call your healthcare provider right away if any of these occur:    Fever of 100.4 F (38 C) or higher, or as directed by your healthcare provider    Increasing pain, swelling, or tenderness in the affected area    Spreading warmth or redness in the affected area  Call 911  Call 911 if any of these occur:    Trouble breathing    Chest pain or discomfort that worsens with deep breathing or coughing    Coughing (may cough up blood)    Fast or irregular heartbeat    Sweating    Anxiety    Lightheadedness, dizziness, or fainting    Extreme confusion    Extreme drowsiness or trouble waking up    New pain in the chest, arm, shoulder, neck, or upper back   Date Last Reviewed: 4/1/2018 2000-2018 [employer]. [address] [place] [address], [place] [address]. All rights reserved. This information is not intended as a substitute for professional medical care. Always follow your healthcare professional's instructions.            Stanislaw Holloway MD  [facility]

## 2018-11-15 NOTE — PATIENT INSTRUCTIONS
Superficial Thrombophlebitis   The superficial veins are the veins near the surface of the skin. Superficial thrombophlebitis is a problem that occurs when one or more of these veins become red, irritated, and swollen. This is most often because of a blood clot.  Causes  The problem may occur after injury to a vein. It may also occur after having an intravenous (IV) line placed. Other factors that can make the problem more likely include:    Varicose veins    Venous insufficiency    Bleeding disorders    Prolonged periods of rest and not moving around    IV drug abuse    Pregnancy    Use of birth control pills or estrogen therapy  Symptoms  Symptoms may appear in the affected area. They can include:    Pain    Tenderness    Redness    Warmth    Swelling    Hardening of the vein  In most cases, superficial thrombophlebitis resolves on its own with no problems. Treatment is focused on relieving symptoms.  Sometimes, there is a risk that the deep veins in the body may also be involved. This can lead to more serious problems. In such cases, further testing and treatments may be needed. Your healthcare provider can tell you more about this.  Home care  To help relieve pain and swelling, you may be told to:    Apply heat or cold to the affected area. Do this for up to 10 minutes as often as directed.  ? Heat: Use a warm compress, such as a heating pad.  ? Cold: Use a cold compress, such as a cold pack or bag of ice wrapped in a thin towel.    Take nonsteroidal anti-inflammatory drugs (NSAIDS), such as ibuprofen. In some cases, other pain medicines may be prescribed.    Keep the affected limb (arm or leg) raised above heart level as directed.    Wear elastic compression stockings or bandages as directed.    Don't sit or stand for long periods. Get up and walk often.  To help treat a blood clot, a blood thinner (anticoagulant) may be prescribed. If this is needed, be sure to take the medicine exactly as  directed.  Follow-up care  Follow up with your healthcare provider as advised. If imaging tests are done, they will be reviewed by a doctor. You ll be told the results and any new findings that may affect your treatment.  When to seek medical advice  Call your healthcare provider right away if any of these occur:    Fever of 100.4 F (38 C) or higher, or as directed by your healthcare provider    Increasing pain, swelling, or tenderness in the affected area    Spreading warmth or redness in the affected area  Call 911  Call 911 if any of these occur:    Trouble breathing    Chest pain or discomfort that worsens with deep breathing or coughing    Coughing (may cough up blood)    Fast or irregular heartbeat    Sweating    Anxiety    Lightheadedness, dizziness, or fainting    Extreme confusion    Extreme drowsiness or trouble waking up    New pain in the chest, arm, shoulder, neck, or upper back   Date Last Reviewed: 4/1/2018 2000-2018 [employer]. [address] [place] [address]. All rights reserved. This information is not intended as a substitute for professional medical care. Always follow your healthcare professional's instructions.

## 2019-02-12 ENCOUNTER — TELEPHONE (OUTPATIENT)
Dept: NURSING | Facility: CLINIC | Age: 34
End: 2019-02-12

## 2019-02-12 ENCOUNTER — E-VISIT (OUTPATIENT)
Dept: FAMILY MEDICINE | Facility: CLINIC | Age: 34
End: 2019-02-12
Payer: COMMERCIAL

## 2019-02-12 DIAGNOSIS — R30.0 DYSURIA: Primary | ICD-10-CM

## 2019-02-13 NOTE — TELEPHONE ENCOUNTER
"\"I thought I might have a UTI, so I did an E visit online. I was having burning and frequency. But just now I peed and there's blood in my urine.\" denies triage , is wondering if she needs to go in. Advised ER([facility] is closed)  "

## 2019-07-23 ENCOUNTER — OFFICE VISIT (OUTPATIENT)
Dept: FAMILY MEDICINE | Facility: CLINIC | Age: 34
End: 2019-07-23
Payer: COMMERCIAL

## 2019-07-23 VITALS
HEIGHT: 65 IN | OXYGEN SATURATION: 100 % | HEART RATE: 88 BPM | DIASTOLIC BLOOD PRESSURE: 68 MMHG | BODY MASS INDEX: 21.66 KG/M2 | TEMPERATURE: 98.2 F | RESPIRATION RATE: 18 BRPM | WEIGHT: 130 LBS | SYSTOLIC BLOOD PRESSURE: 100 MMHG

## 2019-07-23 DIAGNOSIS — N10 PYELONEPHRITIS, ACUTE: Primary | ICD-10-CM

## 2019-07-23 DIAGNOSIS — R39.9 UTI SYMPTOMS: ICD-10-CM

## 2019-07-23 LAB
ALBUMIN UR-MCNC: NEGATIVE MG/DL
APPEARANCE UR: CLEAR
BILIRUB UR QL STRIP: NEGATIVE
COLOR UR AUTO: ABNORMAL
GLUCOSE UR STRIP-MCNC: NEGATIVE MG/DL
HGB UR QL STRIP: ABNORMAL
KETONES UR STRIP-MCNC: NEGATIVE MG/DL
LEUKOCYTE ESTERASE UR QL STRIP: ABNORMAL
MUCOUS THREADS #/AREA URNS LPF: PRESENT /LPF
NITRATE UR QL: NEGATIVE
PH UR STRIP: 6 PH (ref 5–7)
RBC #/AREA URNS AUTO: 1 /HPF (ref 0–2)
SOURCE: ABNORMAL
SP GR UR STRIP: 1 (ref 1–1.03)
SQUAMOUS #/AREA URNS AUTO: 2 /HPF (ref 0–1)
UROBILINOGEN UR STRIP-MCNC: 0 MG/DL (ref 0–2)
WBC #/AREA URNS AUTO: 20 /HPF (ref 0–5)

## 2019-07-23 PROCEDURE — 99214 OFFICE O/P EST MOD 30 MIN: CPT | Performed by: FAMILY MEDICINE

## 2019-07-23 PROCEDURE — 87088 URINE BACTERIA CULTURE: CPT | Performed by: FAMILY MEDICINE

## 2019-07-23 PROCEDURE — 87186 SC STD MICRODIL/AGAR DIL: CPT | Performed by: FAMILY MEDICINE

## 2019-07-23 PROCEDURE — 81001 URINALYSIS AUTO W/SCOPE: CPT | Performed by: FAMILY MEDICINE

## 2019-07-23 PROCEDURE — 87086 URINE CULTURE/COLONY COUNT: CPT | Performed by: FAMILY MEDICINE

## 2019-07-23 RX ORDER — CIPROFLOXACIN 500 MG/1
500 TABLET, FILM COATED ORAL 2 TIMES DAILY
Qty: 10 TABLET | Refills: 0 | Status: SHIPPED | OUTPATIENT
Start: 2019-07-23 | End: 2019-07-28

## 2019-07-23 ASSESSMENT — MIFFLIN-ST. JEOR: SCORE: 1300.56

## 2019-07-23 ASSESSMENT — PAIN SCALES - GENERAL: PAINLEVEL: EXTREME PAIN (8)

## 2019-07-23 NOTE — PROGRESS NOTES
"Subjective     Debbie Gage is a 34 year old female who presents to clinic today for the following health issues:    HPI   UTI - Female  Duration of complaint: 3 days.    Description:   Painful urination (Dysuria): YES  Blood in urine (Hematuria): no   Delay in urine (Hesitency): YES  Intensity: 8/10  Progression of Symptoms:  worsening  Accompanying Signs & Symptoms:  Fever/chills: YES  Flank pain YES  Nausea and vomiting: YES  Any vaginal symptoms: none  Abdominal/Pelvic Pain: no   History:   History of frequent UTI's: YES  History of kidney stones: YES, 16 years, and 10 years. Sexually Active: YES  Possibility of pregnancy: No  Precipitating factors:   Therapies Tried and outcome: , Increase fluid intake               Having bilateral low back pain and flu like symptoms.  Some nausea noted.  No fevers.  Has not had ibuprofen or acetaminophen today.    Reviewed and updated as needed this visit by Provider  Tobacco  Allergies  Meds  Problems  Med Hx  Surg Hx  Fam Hx         Review of Systems   ROS COMP: Constitutional, HEENT, cardiovascular, pulmonary, GI, , musculoskeletal, neuro, skin, endocrine and psych systems are negative, except as otherwise noted.      Objective    /68   Pulse 88   Temp 98.2  F (36.8  C) (Temporal)   Resp 18   Ht 1.651 m (5' 5\")   Wt 59 kg (130 lb)   LMP 07/15/2019   SpO2 100%   Breastfeeding? No   BMI 21.63 kg/m    Body mass index is 21.63 kg/m .  Physical Exam   Constitutional: She appears well-developed and well-nourished.   Cardiovascular: Normal rate, regular rhythm, S1 normal, S2 normal and normal heart sounds.   No murmur heard.  Pulmonary/Chest: Effort normal and breath sounds normal. No respiratory distress. She has no wheezes. She has no rhonchi. She has no rales.   Abdominal: Soft. Normal appearance and bowel sounds are normal. There is tenderness in the suprapubic area. There is CVA tenderness (bilateral, right>left).   Neurological: She is alert.    "       Diagnostic Test Results:  Labs reviewed in Epic  Results for orders placed or performed in visit on 07/23/19 (from the past 24 hour(s))   *UA reflex to Microscopic and Culture ([place]; [facility]; [facility]; [facility]; [facility]; [facility]; [place]; [place])   Result Value Ref [place]    Color Urine Straw     Appearance Urine Clear     Glucose Urine Negative NEG^Negative mg/dL    Bilirubin Urine Negative NEG^Negative    Ketones Urine Negative NEG^Negative mg/dL    Specific Gravity Urine 1.002 (L) 1.003 - 1.035    Blood Urine Small (A) NEG^Negative    pH Urine 6.0 5.0 - 7.0 pH    Protein Albumin Urine Negative NEG^Negative mg/dL    Urobilinogen mg/dL 0.0 0.0 - 2.0 mg/dL    Nitrite Urine Negative NEG^Negative    Leukocyte Esterase Urine Small (A) NEG^Negative    Source Unspecified Urine     RBC Urine 1 0 - 2 /HPF    WBC Urine 20 (H) 0 - 5 /HPF    Squamous Epithelial /HPF Urine 2 (H) 0 - 1 /HPF    Mucous Urine Present (A) NEG^Negative /LPF           Assessment & Plan     ASSESSMENT/ORDERS:    ICD-10-CM    1. Pyelonephritis, acute N10 ciprofloxacin (CIPRO) 500 MG tablet   2. UTI symptoms R39.9 *UA reflex to Microscopic and Culture ([place]; [facility]; [facility]; [facility]; [facility]; [facility]; [place]; [place])     Urine Culture Aerobic Bacterial     PLAN:  1.  Patient has clinic symptoms for pyelonephritis.  Cipro 500 mg twice daily x5 days.  Urine culture sent, will contact her with results and adjust antibiotic if indicated.              Return for recheck if symptoms worsen or fail to improve.    Derrick Rosas MD  [facility]

## 2019-07-25 LAB
BACTERIA SPEC CULT: ABNORMAL
Lab: ABNORMAL
SPECIMEN SOURCE: ABNORMAL

## 2019-07-25 NOTE — RESULT ENCOUNTER NOTE
Debbie,  Your results show bacteria in your urine that should be responsive to the antibiotic that was prescribed to you.  Please let me know if you have any questions.    Sincerely,  Dr. Rosas

## 2019-12-03 ENCOUNTER — IMMUNIZATION (OUTPATIENT)
Dept: FAMILY MEDICINE | Facility: CLINIC | Age: 34
End: 2019-12-03
Payer: COMMERCIAL

## 2019-12-03 PROCEDURE — 90471 IMMUNIZATION ADMIN: CPT

## 2019-12-03 PROCEDURE — 90686 IIV4 VACC NO PRSV 0.5 ML IM: CPT

## 2020-02-29 ENCOUNTER — NURSE TRIAGE (OUTPATIENT)
Dept: NURSING | Facility: CLINIC | Age: 35
End: 2020-02-29

## 2020-02-29 ENCOUNTER — TELEPHONE (OUTPATIENT)
Dept: FAMILY MEDICINE | Facility: CLINIC | Age: 35
End: 2020-02-29

## 2020-02-29 NOTE — TELEPHONE ENCOUNTER
"S: Debbie calling about worms.  B: Son Jeffrey was at [place] ED on 2/28 was treated with Ivermectin 3mg for a worm found coming out of his rectum.The worm was 4 \" long, pink and a pointed head. Debbie wondering if she and the rest of her family need to be treated?     A: Advised to take family to urgent care and saved worm sample.  R: Debbie would like on call provider paged.  Dr. Rosas paged to call back writer.  He advise is for Debbie and her family to make appointments to see PCP on Monday.  Writer called Debbie and transferred her to [facility] to make an appointment.  Kathy Deras RN, [facility]    Additional Information    Negative: Patient sounds very sick or weak to the triager    Negative: [1] Rectal pain or redness AND [2] fever    Negative: Pinworms seen (white thread-like worm about size of a staple, moves)    Negative: [1] Pinworms NOT Seen AND [2] rectal itching AND [3] EXPOSURE to pinworms    Pinworm exposure, questions about    Negative: Rash of rectal area (e.g., open sore, painful tiny water blisters, unexplained bumps)    Negative: Rectal area looks infected (e.g., draining sore, spreading redness)    Negative: [1] Treated with pinworm medicine > 7 days ago AND [2] rectal itch or redness have not gone away    Negative: Pinworms persist or recur after second dose of pinworm medicine    Negative: [1] Pregnant with rectal symptoms AND [2] pinworms seen or suspected    Protocols used: KIXENLAE-T-AT      "

## 2020-02-29 NOTE — TELEPHONE ENCOUNTER
Patient called today.    Patient is requesting to be seen on Monday, March 2, 2020 at  Clinic    Patient has been triaged for possible intestinal worms.    Patient's child has been positive.    Please contact patient.    Thank you.    Central Scheduling  Kesha WHITLEY

## 2020-03-01 ENCOUNTER — HEALTH MAINTENANCE LETTER (OUTPATIENT)
Age: 35
End: 2020-03-01

## 2020-03-02 NOTE — TELEPHONE ENCOUNTER
Patient is scheduled on 3/3/2020 with Jodi.    Latesha Herring, [facility]

## 2020-03-02 NOTE — TELEPHONE ENCOUNTER
We are unable to see pt today as schedules are full. If pt wants to be seen today, Roberto has many openings. I have attempted to contact pt with this message. Otherwise, she does have an appt tomorrow and could keep that. Tiffani Torrez CMA ([facility])

## 2020-03-02 NOTE — PROGRESS NOTES
Subjective     Debbie Gage is a 34 year old female who presents to clinic today for the following health issues:    HPI     Patient's son had a worm come out while at school. The emergency room would not treat family so she is wanting to get treated. Her son who had the worm was treated with ivermectin. She brought the worm in.         Patient Active Problem List   Diagnosis     Endometriosis     S/P LEEP of cervix     Diarrhea     Encounter for triage in pregnant patient     S/P  section     History of kidney stones     Past Surgical History:   Procedure Laterality Date     AS ENLARGE BREAST WITH IMPLANT       silicone implants      SECTION      X2      SECTION N/A 2018    Procedure:  SECTION;   SECTION;  Surgeon: Ranjith King MD;  Location: [facility] L[facility]     CYSTECTOMY OVARIAN BENIGN       DILATION AND CURETTAGE, HYSTEROSCOPY, LAPAROSCOPY, COMBINED N/A 3/6/2017    Procedure: COMBINED DILATION AND CURETTAGE, HYSTEROSCOPY, LAPAROSCOPY;  Surgeon: Ranjith King MD;  Location: [facility] OR     ENT SURGERY      wisdom teeth extraction     GYN SURGERY      laparoscopy x 4     LAPAROSCOPIC ABLATION ENDOMETRIOSIS  3/6/2017    Procedure: LAPAROSCOPIC ABLATION ENDOMETRIOSIS;  Surgeon: Ranjith King MD;  Location: [facility] OR     LAPAROSCOPIC LYSIS ADHESIONS N/A 3/6/2017    Procedure: LAPAROSCOPIC LYSIS ADHESIONS;  Surgeon: Ranjith King MD;  Location: [facility] OR     LEEP TX, CERVICAL       ORTHOPEDIC SURGERY      ankle edwin surgery (tendon repair)       Social History     Tobacco Use     Smoking status: Never Smoker     Smokeless tobacco: Current User   Substance Use Topics     Alcohol use: Yes     Alcohol/week: 2.0 standard drinks     Types: 2 Glasses of wine per week     Comment: occas     Family History   Problem Relation Age of Onset     Other Cancer Maternal Grandmother         ovarian     Diabetes Maternal Grandmother      Diabetes  "Maternal Grandfather      Colon Cancer Paternal Grandfather      Seasonal/Environmental Allergies Son      Allergy (Severe) Brother         dairy and shellfish     Asthma Brother          Current Outpatient Medications   Medication Sig Dispense Refill     albendazole (ALBENZA) 200 MG tablet Take 2 tablets orally today and repeat same dose in 3 weeks. 4 tablet 0     ibuprofen (ADVIL/MOTRIN) 600 MG tablet Take 1 tablet (600 mg) by mouth every 6 hours as needed for moderate pain (Patient not taking: Reported on 10/23/2018) 80 tablet 1     Allergies   Allergen Reactions     Sulfa Drugs Hives     BP Readings from Last 3 Encounters:   03/03/20 100/62   07/23/19 100/68   11/14/18 112/64    Wt Readings from Last 3 Encounters:   03/03/20 56.3 kg (124 lb 1.6 oz)   07/23/19 59 kg (130 lb)   11/14/18 62.8 kg (138 lb 6 oz)                    Reviewed and updated as needed this visit by Provider         Review of Systems   ROS COMP: Constitutional, HEENT, cardiovascular, pulmonary, gi and gu systems are negative, except as otherwise noted.      Objective    /62   Pulse 85   Temp 97.4  F (36.3  C) (Temporal)   Resp 16   Wt 56.3 kg (124 lb 1.6 oz)   LMP 02/05/2020 (Exact Date)   BMI 20.65 kg/m    Body mass index is 20.65 kg/m .  Physical Exam   GENERAL APPEARANCE: healthy, alert and no distress  MS: extremities normal- no gross deformities noted  SKIN: no suspicious lesions or rashes  PSYCH: mentation appears normal and affect normal/bright    Diagnostic Test Results:  Labs reviewed in Epic        Assessment & Plan     1. Other hookworm diseases  Per care everywhere \"Anthelminthic treatment of hookworm infection consists of albendazole (400 mg once on empty stomach) [31,32]. Mebendazole (100 mg twice daily for three days is more effective than a single dose of 500 mg) and pyrantel pamoate (11 mg/kg per day for three days, not to exceed 1 g/day) are acceptable alternative therapies [31]. Tribendimidine, a broadspectrum " "anthelmintic agent, also has efficacy against hookworm [33]. Ivermectin has poor efficacy against hookworm\"   I have ordered albendazole for each of the family members. Recommended getting dogs checked for hookworm and treated accordingly if needed.   - albendazole (ALBENZA) 200 MG tablet; Take 2 tablets orally today and repeat same dose in 3 weeks.  Dispense: 4 tablet; Refill: 0     No follow-ups on file.    CHRISSY Ruiz [facility]    "

## 2020-03-03 ENCOUNTER — OFFICE VISIT (OUTPATIENT)
Dept: FAMILY MEDICINE | Facility: OTHER | Age: 35
End: 2020-03-03
Payer: COMMERCIAL

## 2020-03-03 ENCOUNTER — TELEPHONE (OUTPATIENT)
Dept: FAMILY MEDICINE | Facility: OTHER | Age: 35
End: 2020-03-03

## 2020-03-03 VITALS
SYSTOLIC BLOOD PRESSURE: 100 MMHG | DIASTOLIC BLOOD PRESSURE: 62 MMHG | BODY MASS INDEX: 20.65 KG/M2 | WEIGHT: 124.1 LBS | RESPIRATION RATE: 16 BRPM | HEART RATE: 85 BPM | TEMPERATURE: 97.4 F

## 2020-03-03 DIAGNOSIS — B76.8: Primary | ICD-10-CM

## 2020-03-03 PROCEDURE — 99213 OFFICE O/P EST LOW 20 MIN: CPT | Performed by: STUDENT IN AN ORGANIZED HEALTH CARE EDUCATION/TRAINING PROGRAM

## 2020-03-03 RX ORDER — ALBENDAZOLE 200 MG/1
400 TABLET, FILM COATED ORAL ONCE
Qty: 2 TABLET | Refills: 0 | Status: SHIPPED | OUTPATIENT
Start: 2020-03-03 | End: 2020-03-03

## 2020-03-03 RX ORDER — ALBENDAZOLE 200 MG/1
TABLET, FILM COATED ORAL
Qty: 4 TABLET | Refills: 0 | Status: SHIPPED | OUTPATIENT
Start: 2020-03-03 | End: 2020-09-02

## 2020-03-03 ASSESSMENT — PAIN SCALES - GENERAL: PAINLEVEL: NO PAIN (0)

## 2020-03-03 NOTE — TELEPHONE ENCOUNTER
Please call her insurance [phone] to see if they will give prior auth for albendazole for hookworm. Other options are mebendazole or pyrantel pamoate - may see if they cover these instead.    Ivermectin is covered but does not treat hookworm.    Petra Zapata, LAURE-C

## 2020-03-03 NOTE — TELEPHONE ENCOUNTER
Prior Authorization Retail Medication Request    Medication/Dose: albendazole (ALBENZA) 200 MG tablet  ICD code (if different than what is on RX):    Previously Tried and Failed:    Rationale:      Insurance Name:    Insurance ID:        Pharmacy Information (if different than what is on RX)  Name:    Phone:

## 2020-03-03 NOTE — TELEPHONE ENCOUNTER
Representative from  called back and requested note read specifying Hookworm in the notes for diagnosis. Read the note and that was sufficient for her to continue her process.     No need to return call.

## 2020-03-03 NOTE — TELEPHONE ENCOUNTER
Left message for patient to return call. Please let patient know we are working on getting prior authorization for her medication.

## 2020-03-03 NOTE — TELEPHONE ENCOUNTER
PA has been APPROVED efft 2/3/2020 4/3/2020  Case#[id number]  Pharmacy has been notified    Closing encounter  Lindy HUMPHREY (R)

## 2020-03-04 ENCOUNTER — NURSE TRIAGE (OUTPATIENT)
Dept: NURSING | Facility: CLINIC | Age: 35
End: 2020-03-04

## 2020-03-04 NOTE — TELEPHONE ENCOUNTER
Debbie calls back and requests to speak with Lindy (clinic). FNA informed her that clinic is closed.     FNA spoke with Renee from [facility]'s. Albendazole for Debbie is approved.  She has further questions about her kids' prescriptions. Advised to call insurance or call clinic in AM. She verbalized understanding.    Natalia Mckeon RN/[facility]    Reason for Disposition    Caller has medication question, adult has minor symptoms, caller declines triage, and triager answers question    Protocols used: MEDICATION QUESTION CALL-A-AH

## 2020-05-14 ENCOUNTER — MYC MEDICAL ADVICE (OUTPATIENT)
Dept: FAMILY MEDICINE | Facility: CLINIC | Age: 35
End: 2020-05-14

## 2020-05-14 ENCOUNTER — VIRTUAL VISIT (OUTPATIENT)
Dept: FAMILY MEDICINE | Facility: CLINIC | Age: 35
End: 2020-05-14
Payer: COMMERCIAL

## 2020-05-14 ENCOUNTER — TELEPHONE (OUTPATIENT)
Dept: FAMILY MEDICINE | Facility: CLINIC | Age: 35
End: 2020-05-14

## 2020-05-14 DIAGNOSIS — R10.84 ABDOMINAL PAIN, GENERALIZED: Primary | ICD-10-CM

## 2020-05-14 PROCEDURE — 99207 ZZC NO BILLABLE SERVICE THIS VISIT: CPT | Mod: TEL | Performed by: OBSTETRICS & GYNECOLOGY

## 2020-05-14 NOTE — TELEPHONE ENCOUNTER
Please see Pretty Simplet message below.   Thanks.     Appointment Request From: Debbie Gage       With Provider: Ranjith King MD [[facility]]       Preferred Date Range: 5/18/2020 - 5/22/2020       Preferred Times: Any Time       Reason for visit: Request an Appointment       Comments:    Sever abdominal pain and cramping. Wondering if its endometriosis again

## 2020-05-14 NOTE — PROGRESS NOTES
"Debbie Gage is a 35 year old female who is being evaluated via a billable telephone visit.      The patient has been notified of following:     \"This telephone visit will be conducted via a call between you and your physician/provider. We have found that certain health care needs can be provided without the need for a physical exam.  This service lets us provide the care you need with a short phone conversation.  If a prescription is necessary we can send it directly to your pharmacy.  If lab work is needed we can place an order for that and you can then stop by our lab to have the test done at a later time.    Telephone visits are billed at different rates depending on your insurance coverage. During this emergency period, for some insurers they may be billed the same as an in-person visit.  Please reach out to your insurance provider with any questions.    If during the course of the call the physician/provider feels a telephone visit is not appropriate, you will not be charged for this service.\"    Patient has given verbal consent for Telephone visit?  Yes    What phone number would you like to be contacted at? [phone]    How would you like to obtain your AVS? MyChart    Subjective: Debbie requested a phone consultation today because of concerns regarding pain in the periumbilical area and the pelvis.. Due to the current covid-19 coronavirus epidemic we are managing much of our patients' concerns remotely when possible.    History of kidney stones- doesn't feel like that- no back pain- feels like her prior endometriosis. No fevers or vomiting or changes in BMs. Pain began 2 days ago- LMP     Rates pain as 8 out of 10. No hematuria. No dysuria. The pain is mostly in the center of her abdomen and pelvis- not lateral.    I did perform laparoscopy in 2017 and treated endometriosis. She then had a  section in 2018 and no endoemtriosis was seen.    The past medical history and medications and " allergies have been reviewed today by me.  .  Past Medical History:   Diagnosis Date     Endometriosis      S/P LEEP (loop electrosurgical excision procedure) 2010     Allergies   Allergen Reactions     Sulfa Drugs Hives     Current Outpatient Medications   Medication Sig Dispense Refill     ibuprofen (ADVIL/MOTRIN) 600 MG tablet Take 1 tablet (600 mg) by mouth every 6 hours as needed for moderate pain 80 tablet 1     albendazole (ALBENZA) 200 MG tablet Take 2 tablets orally today and repeat same dose in 3 weeks. (Patient not taking: Reported on 5/14/2020) 4 tablet 0       Assessment/Plan:  Midline periumbilical abdominal and pelvic pain- severe- I advised a clinic visit today and if that is not available then ER visit- Heidy my nurse will call her to arrange that.      Phone call duration was   4.5  minutes.     SRUTHI King MD

## 2020-05-14 NOTE — TELEPHONE ENCOUNTER
Patient is scheduled with Dr. King.  She states she has had this in the past and does not have an appendix.  See MyChart discussion.  Closing this encounter.  Ida Farias, NORAHN, RN

## 2020-05-15 NOTE — TELEPHONE ENCOUNTER
Patient had a virtual visit with PCP yesterday.  He advised face 2 face visit which was scheduled, then canceled.  Closing this encounter.  NORAH OsullivanN, RN

## 2020-08-26 ENCOUNTER — TELEPHONE (OUTPATIENT)
Dept: FAMILY MEDICINE | Facility: OTHER | Age: 35
End: 2020-08-26

## 2020-08-26 DIAGNOSIS — B76.8: ICD-10-CM

## 2020-08-27 NOTE — TELEPHONE ENCOUNTER
Pending Prescriptions:                       Disp   Refills    albendazole (ALBENZA) 200 MG tablet [Pharm*4 tabl*0        Sig: TAKE 2 TABLETS BY MOUTH TODAY AND REPEAT THE SAME           DOSE IN 3 WEEKS    Routing refill request to provider for review/approval because:  Drug not on the [facility] refill protocol

## 2020-08-28 RX ORDER — ALBENDAZOLE 200 MG/1
TABLET, FILM COATED ORAL
Qty: 4 TABLET | Refills: 0 | OUTPATIENT
Start: 2020-08-28

## 2020-08-28 NOTE — TELEPHONE ENCOUNTER
Patient requested refill of hook worm medication. Needs to be seen to discuss refill of this. Please call to schedule appointment if needed.  Laura Zapata, CNP

## 2020-09-02 ENCOUNTER — HOSPITAL ENCOUNTER (EMERGENCY)
Facility: CLINIC | Age: 35
Discharge: HOME | End: 2020-09-02
Attending: FAMILY MEDICINE | Admitting: FAMILY MEDICINE
Payer: COMMERCIAL

## 2020-09-02 ENCOUNTER — APPOINTMENT (OUTPATIENT)
Dept: GENERAL RADIOLOGY | Facility: CLINIC | Age: 35
End: 2020-09-02
Attending: FAMILY MEDICINE
Payer: COMMERCIAL

## 2020-09-02 VITALS
TEMPERATURE: 98.6 F | SYSTOLIC BLOOD PRESSURE: 102 MMHG | DIASTOLIC BLOOD PRESSURE: 76 MMHG | RESPIRATION RATE: 20 BRPM | HEART RATE: 80 BPM | HEIGHT: 64 IN | BODY MASS INDEX: 21.85 KG/M2 | OXYGEN SATURATION: 100 % | WEIGHT: 128 LBS

## 2020-09-02 DIAGNOSIS — Q84.6 EPONYCHIA: ICD-10-CM

## 2020-09-02 DIAGNOSIS — L03.011 PARONYCHIA OF RIGHT MIDDLE FINGER: ICD-10-CM

## 2020-09-02 PROCEDURE — 99283 EMERGENCY DEPT VISIT LOW MDM: CPT | Mod: 25 | Performed by: FAMILY MEDICINE

## 2020-09-02 PROCEDURE — 10060 I&D ABSCESS SIMPLE/SINGLE: CPT | Performed by: FAMILY MEDICINE

## 2020-09-02 PROCEDURE — 10060 I&D ABSCESS SIMPLE/SINGLE: CPT | Mod: Z6 | Performed by: FAMILY MEDICINE

## 2020-09-02 PROCEDURE — 73140 X-RAY EXAM OF FINGER(S): CPT | Mod: TC,RT

## 2020-09-02 RX ORDER — ACETAMINOPHEN 500 MG
1000 TABLET ORAL EVERY 6 HOURS PRN
Status: ON HOLD | COMMUNITY
End: 2022-01-24

## 2020-09-02 RX ORDER — CLINDAMYCIN HCL 300 MG
300 CAPSULE ORAL 4 TIMES DAILY
Qty: 28 CAPSULE | Refills: 0 | Status: SHIPPED | OUTPATIENT
Start: 2020-09-02 | End: 2020-09-08

## 2020-09-02 RX ORDER — OXYCODONE HYDROCHLORIDE 5 MG/1
5-10 TABLET ORAL EVERY 4 HOURS PRN
Qty: 10 TABLET | Refills: 0 | Status: SHIPPED | OUTPATIENT
Start: 2020-09-02 | End: 2021-10-14

## 2020-09-02 RX ORDER — BUPIVACAINE HYDROCHLORIDE 5 MG/ML
10 INJECTION, SOLUTION EPIDURAL; INTRACAUDAL ONCE
Status: DISCONTINUED | OUTPATIENT
Start: 2020-09-02 | End: 2020-09-02 | Stop reason: HOSPADM

## 2020-09-02 ASSESSMENT — MIFFLIN-ST. JEOR: SCORE: 1270.6

## 2020-09-02 NOTE — ED PROVIDER NOTES
"  History     Chief Complaint   Patient presents with     Hand Injury     HPI  Debbie Gage is a 35 year old female who presents to the ED this morning with increasing right middle finger pain.  She got her middle finger caught in a car door trying to save her daughter's fingers as her brother was shutting the door.  It hurt initially but as the evening is gone on has become more swollen and red.  A little bit of serosanguineous drainage from the proximal corner of the nail under the cuticle when she tried squeezing it.  Pain now extends up the finger into the hand.  She is right-hand dominant.  No other injuries.    Allergies:  Allergies   Allergen Reactions     Sulfa Drugs Hives       Problem List:    Patient Active Problem List    Diagnosis Date Noted     Encounter for triage in pregnant patient 2018     Priority: Medium     S/P  section 2018     Priority: Medium     Diarrhea 2018     Priority: Medium     S/P LEEP of cervix      Priority: Medium      Abnormal pap that required LEEP. Pathology unknown. \"did have all of her follow-up Pap smears, all of which were negative. She has been back on routine screening\" (per visit notes on 17)  12 NIL pap  17 NIL pap/neg HR HPV. Plan: cotest in 3 years.           Endometriosis 2017     Priority: Medium     History of kidney stones 10/28/2014     Priority: Medium        Past Medical History:    Past Medical History:   Diagnosis Date     Endometriosis      S/P LEEP (loop electrosurgical excision procedure)        Past Surgical History:    Past Surgical History:   Procedure Laterality Date     AS ENLARGE BREAST WITH IMPLANT       silicone implants      SECTION      X2      SECTION N/A 2018    Procedure:  SECTION;   SECTION;  Surgeon: Ranjith King MD;  Location:  L+D     CYSTECTOMY OVARIAN BENIGN       DILATION AND CURETTAGE, HYSTEROSCOPY, LAPAROSCOPY, COMBINED N/A " "3/6/2017    Procedure: COMBINED DILATION AND CURETTAGE, HYSTEROSCOPY, LAPAROSCOPY;  Surgeon: Ranjith King MD;  Location: [facility] OR     ENT SURGERY      wisdom teeth extraction     GYN SURGERY      laparoscopy x 4     LAPAROSCOPIC ABLATION ENDOMETRIOSIS  3/6/2017    Procedure: LAPAROSCOPIC ABLATION ENDOMETRIOSIS;  Surgeon: Ranjith King MD;  Location: [facility] OR     LAPAROSCOPIC LYSIS ADHESIONS N/A 3/6/2017    Procedure: LAPAROSCOPIC LYSIS ADHESIONS;  Surgeon: Ranjith King MD;  Location: [facility] OR     LEEP TX, CERVICAL  2010     ORTHOPEDIC SURGERY      ankle edwin surgery (tendon repair)       Family History:    Family History   Problem Relation Age of Onset     Other Cancer Maternal Grandmother         ovarian     Diabetes Maternal Grandmother      Diabetes Maternal Grandfather      Colon Cancer Paternal Grandfather      Seasonal/Environmental Allergies Son      Allergy (Severe) Brother         dairy and shellfish     Asthma Brother        Social History:  Marital Status:   [2]  Social History     Tobacco Use     Smoking status: Never Smoker     Smokeless tobacco: Current User   Substance Use Topics     Alcohol use: Yes     Alcohol/week: 2.0 standard drinks     Types: 2 Glasses of wine per week     Comment: occas     Drug use: No        Medications:    acetaminophen (TYLENOL) 500 MG tablet  clindamycin (CLEOCIN) 300 MG capsule  ibuprofen (ADVIL/MOTRIN) 600 MG tablet  oxyCODONE (ROXICODONE) 5 MG tablet          Review of Systems    Physical Exam   BP: 102/76  Pulse: 80  Temp: 98.6  F (37  C)  Resp: 20  Height: 162.6 cm (5' 4\")  Weight: 58.1 kg (128 lb)  SpO2: 100 %      Physical Exam  Constitutional:       General: She is in acute distress (mild).      Appearance: Normal appearance.   Pulmonary:      Effort: Pulmonary effort is normal. No respiratory distress.   Musculoskeletal:        Hands:    Neurological:      General: No focal deficit present.      Mental Status: She is alert and " oriented to person, place, and time.   Psychiatric:         Mood and Affect: Mood normal.         ED Course  (with Medical Decision Making)      35-year-old female got her right middle finger stuck in a car door about 12 hours ago.  Did not swell up immediately but later in the evening noticed some swelling about the cuticle region and then increased redness and pain to the point where she could not sleep tonight.    X-ray showed no evidence of fracture.  She does have swelling, erythema and some fluctuance especially in the lateral aspect of the nail fold, on the ulnar side.  She did express some serosanguineous fluid previously.    Digital block was placed with 0.5% Marcaine with excellent effect.  We soaked her finger for a few minutes in warm soapy water and then I gently used an 11 blade to incise underneath the nail fold superior to the nail plate.  Just got bloody returns.  Bacitracin and bandage were applied.  She is allergic to sulfa.  Want to cover staph including MRSA and strep so we will treat her with clindamycin.  Warm soapy water soaks 2-4 times a day and I would like her rechecked in clinic in 2 days.  Tylenol ibuprofen for pain.  I gave her a limited prescription of oxycodone for more severe pain if needed.  She will try to get by without if possible once the block wears off.   verbal and written discharge instructions given.  Patient is comfortable with this plan.          Procedures               Critical Care time:  none               Results for orders placed or performed during the hospital encounter of 09/02/20 (from the past 24 hour(s))   XR Finger Right 2 Views    Narrative    EXAM: XR FINGER RT G/E 2 VW  LOCATION: [facility]  DATE/TIME: 9/2/2020 3:41 AM    INDICATION: Distal finger pain and swelling.  COMPARISON: None.      Impression    IMPRESSION: Soft tissue edema. No visible fracture or dislocation.         Medications   bupivacaine (MARCAINE) 0.5% preservative free  injection (has no administration in time range)       Assessments & Plan     I have reviewed the nursing notes.    I have reviewed the findings, diagnosis, plan and need for follow up with the patient.       New Prescriptions    CLINDAMYCIN (CLEOCIN) 300 MG CAPSULE    Take 1 capsule (300 mg) by mouth 4 times daily for 7 days    OXYCODONE (ROXICODONE) 5 MG TABLET    Take 1-2 tablets (5-10 mg) by mouth every 4 hours as needed for pain       Final diagnoses:   Paronychia of right middle finger   Eponychia       9/2/2020   [facility] EMERGENCY DEPARTMENT     Jaime Celis MD  09/02/20 0452

## 2020-09-02 NOTE — DISCHARGE INSTRUCTIONS
Warm soapy water soaks 2-4 times a day.  Take the clindamycin as directed.  Tylenol/ibuprofen as needed for discomfort.  You can use the oxycodone sparingly for more severe pain if needed.  Apply bacitracin to the area before applying a dressing.  You can change this daily and as needed if it gets soiled.  Recheck in clinic in 2 days.  It was a pleasure visiting with you this morning.  I hope this settles down quickly for you.    Thank you for choosing [facility]. We appreciate the opportunity to meet your urgent medical needs. Please let us know if we could have done anything to make your stay more satisfying.    After discharge, please closely monitor for any new or worsening symptoms. Return to the Emergency Department if you develop any acute worsening signs or symptoms.    If you had lab work, cultures or imaging studies done during your stay, the final results may still be pending. We will call you if your plan of care needs to change. However, if you are not improving as expected, please follow up with your primary care provider or clinic.     Start any prescription medications that were prescribed to you and take them as directed.     Please see additional handouts that may be pertinent to your condition.

## 2020-09-02 NOTE — ED TRIAGE NOTES
Pt smashed her middle finger on right hand  in car door  Yesterday . Patient's airway, breathing, circulation, and disability/mental status (ABCDs) intact/WDL during triage.

## 2020-09-02 NOTE — ED AVS SNAPSHOT
[facility] Emergency Department  [address] [place] [address]  Phone:  [phone]  Fax:  [phone]                                    Debbie Gage   MRN: [id number]    Department:  [facility] Emergency Department   Date of Visit:  9/2/2020           After Visit Summary Signature Page    I have received my discharge instructions, and my questions have been answered. I have discussed any challenges I see with this plan with the nurse or doctor.    ..........................................................................................................................................  Patient/Patient Representative Signature      ..........................................................................................................................................  Patient Representative Print Name and Relationship to Patient    ..................................................               ................................................  Date                                   Time    ..........................................................................................................................................  Reviewed by Signature/Title    ...................................................              ..............................................  Date                                               Time          22EPIC Rev 08/18

## 2020-09-09 ENCOUNTER — VIRTUAL VISIT (OUTPATIENT)
Dept: FAMILY MEDICINE | Facility: CLINIC | Age: 35
End: 2020-09-09
Payer: COMMERCIAL

## 2020-09-09 DIAGNOSIS — N30.00 ACUTE CYSTITIS WITHOUT HEMATURIA: Primary | ICD-10-CM

## 2020-09-09 PROCEDURE — 99213 OFFICE O/P EST LOW 20 MIN: CPT | Mod: GT | Performed by: OBSTETRICS & GYNECOLOGY

## 2020-09-09 RX ORDER — NITROFURANTOIN 25; 75 MG/1; MG/1
100 CAPSULE ORAL 2 TIMES DAILY
Qty: 14 CAPSULE | Refills: 0 | Status: SHIPPED | OUTPATIENT
Start: 2020-09-09 | End: 2021-10-14

## 2020-09-09 NOTE — PROGRESS NOTES
"Debbie Gage is a 35 year old female who is being evaluated via a billable video visit.      The patient has been notified of following:     \"This video visit will be conducted via a call between you and your physician/provider. We have found that certain health care needs can be provided without the need for an in-person physical exam.  This service lets us provide the care you need with a video conversation.  If a prescription is necessary we can send it directly to your pharmacy.  If lab work is needed we can place an order for that and you can then stop by our lab to have the test done at a later time.    Video visits are billed at different rates depending on your insurance coverage.  Please reach out to your insurance provider with any questions.    If during the course of the call the physician/provider feels a video visit is not appropriate, you will not be charged for this service.\"    Patient has given verbal consent for Video visit? Yes  How would you like to obtain your AVS? Mail a copy  If you are dropped from the video visit, the video invite should be resent to: Text to cell phone: [phone]  Will anyone else be joining your video visit? No      Subjective     Debbie Gage is a 35 year old female who presents today via video visit for the following health issues:    Subjective: Debbie requested a videoconference consultation today . Due to the current covid-19 coronavirus epidemic we are managing much of our patients' concerns remotely when possible.    History of present illness: she woke up with dysuria and urgency consistent with prior UTIs-no hematuria.no backache. She is strongly requesting antibiotic without clinic or lab visit because she has kids home. It has worked well in past. Allergic to sulfa.  No fevers.    The past medical history and medications and allergies have been reviewed today by me.  .  Past Medical History:   Diagnosis Date     Endometriosis      S/P LEEP (loop " electrosurgical excision procedure) 2010     Allergies   Allergen Reactions     Sulfa Drugs Hives     Current Outpatient Medications   Medication Sig Dispense Refill     nitroFURantoin macrocrystal-monohydrate (MACROBID) 100 MG capsule Take 1 capsule (100 mg) by mouth 2 times daily 14 capsule 0     acetaminophen (TYLENOL) 500 MG tablet Take 1,000 mg by mouth every 6 hours as needed for mild pain       ibuprofen (ADVIL/MOTRIN) 600 MG tablet Take 1 tablet (600 mg) by mouth every 6 hours as needed for moderate pain (Patient not taking: Reported on 2020) 80 tablet 1     oxyCODONE (ROXICODONE) 5 MG tablet Take 1-2 tablets (5-10 mg) by mouth every 4 hours as needed for pain (Patient not taking: Reported on 2020) 10 tablet 0     History   Smoking Status     Never Smoker   Smokeless Tobacco     Current User     Past Surgical History:   Procedure Laterality Date     AS ENLARGE BREAST WITH IMPLANT       silicone implants      SECTION      X2      SECTION N/A 2018    Procedure:  SECTION;   SECTION;  Surgeon: Ranjith King MD;  [facility]     CYSTECTOMY OVARIAN BENIGN       DILATION AND CURETTAGE, HYSTEROSCOPY, LAPAROSCOPY, COMBINED N/A 3/6/2017    Procedure: COMBINED DILATION AND CURETTAGE, HYSTEROSCOPY, LAPAROSCOPY;  Surgeon: Ranjith King MD;  Location: [facility]     ENT SURGERY      wisdom teeth extraction     GYN SURGERY      laparoscopy x 4     LAPAROSCOPIC ABLATION ENDOMETRIOSIS  3/6/2017    Procedure: LAPAROSCOPIC ABLATION ENDOMETRIOSIS;  Surgeon: Ranjith King MD;  Location: [facility]     LAPAROSCOPIC LYSIS ADHESIONS N/A 3/6/2017    Procedure: LAPAROSCOPIC LYSIS ADHESIONS;  Surgeon: Ranjith King MD;  Location: [facility]     LEEP TX, CERVICAL       ORTHOPEDIC SURGERY      ankle edwin surgery (tendon repair)     Social History     Tobacco Use     Smoking status: Never Smoker     Smokeless tobacco: Current User   Substance  Use Topics     Alcohol use: Yes     Alcohol/week: 2.0 standard drinks     Types: 2 Glasses of wine per week     Comment: occas     Drug use: No     Family History   Problem Relation Age of Onset     Other Cancer Maternal Grandmother         ovarian     Diabetes Maternal Grandmother      Diabetes Maternal Grandfather      Colon Cancer Paternal Grandfather      Seasonal/Environmental Allergies Son      Allergy (Severe) Brother         dairy and shellfish     Asthma Brother      Review Of Systems  Skin: negative  Eyes: negative  Ears/Nose/Throat: negative  Respiratory: No shortness of breath, dyspnea on exertion, cough, or hemoptysis  Cardiovascular: negative  Gastrointestinal: negative  Genitourinary: negative except for dysuria  Musculoskeletal: negative  Neurologic: negative  Psychiatric: negative  Hematologic/Lymphatic/Immunologic: negative  Endocrine: negative    Objective: she looks well.    Assessment/Plan:  1. UTI- See rx for  macrobid-antibiotics can cause a rash or allergic reaction to develop and so the medication should be stopped if this occurs. She knows to come to clinic for an exam if the sx persist- and that there may be a delay in effective treatment since we are not getting a UC to guide therapy- but she declines a visit now-          Followup: Followup  acutely if symptoms worsening, or in 7-10   days if unresolved.          Start of call: 0940  hours    Call ended: 0945   hours    Videoconference call duration was   5  minutes.     SRUTHI King MD

## 2020-12-14 ENCOUNTER — HEALTH MAINTENANCE LETTER (OUTPATIENT)
Age: 35
End: 2020-12-14

## 2021-04-17 ENCOUNTER — HEALTH MAINTENANCE LETTER (OUTPATIENT)
Age: 36
End: 2021-04-17

## 2021-10-02 ENCOUNTER — HEALTH MAINTENANCE LETTER (OUTPATIENT)
Age: 36
End: 2021-10-02

## 2021-10-14 ENCOUNTER — OFFICE VISIT (OUTPATIENT)
Dept: FAMILY MEDICINE | Facility: CLINIC | Age: 36
End: 2021-10-14
Payer: COMMERCIAL

## 2021-10-14 VITALS
DIASTOLIC BLOOD PRESSURE: 80 MMHG | OXYGEN SATURATION: 98 % | WEIGHT: 132 LBS | BODY MASS INDEX: 21.99 KG/M2 | SYSTOLIC BLOOD PRESSURE: 120 MMHG | TEMPERATURE: 97.3 F | HEIGHT: 65 IN | RESPIRATION RATE: 18 BRPM | HEART RATE: 107 BPM

## 2021-10-14 DIAGNOSIS — Z23 ENCOUNTER FOR IMMUNIZATION: ICD-10-CM

## 2021-10-14 DIAGNOSIS — Z00.00 WELL ADULT EXAM: Primary | ICD-10-CM

## 2021-10-14 DIAGNOSIS — R10.2 PELVIC PAIN IN FEMALE: ICD-10-CM

## 2021-10-14 PROCEDURE — 90682 RIV4 VACC RECOMBINANT DNA IM: CPT | Performed by: OBSTETRICS & GYNECOLOGY

## 2021-10-14 PROCEDURE — 90471 IMMUNIZATION ADMIN: CPT | Performed by: OBSTETRICS & GYNECOLOGY

## 2021-10-14 PROCEDURE — 99214 OFFICE O/P EST MOD 30 MIN: CPT | Mod: 25 | Performed by: OBSTETRICS & GYNECOLOGY

## 2021-10-14 PROCEDURE — 99395 PREV VISIT EST AGE 18-39: CPT | Mod: 25 | Performed by: OBSTETRICS & GYNECOLOGY

## 2021-10-14 ASSESSMENT — PAIN SCALES - GENERAL: PAINLEVEL: MODERATE PAIN (4)

## 2021-10-14 ASSESSMENT — MIFFLIN-ST. JEOR: SCORE: 1291.69

## 2021-10-14 NOTE — PROGRESS NOTES
Subjective:Debbie is here for yearly physical. Current concerns are: He has pelvic pain.  History of endometriosis.  Was first diagnosed years ago at [facility] up in [place] where she was treated by Dr. Surya Dotson.  She has also seen Dr. Posada in [place] and had this treated.  I was the most recent person to treat her in 2018 when I did a laparoscopy and lysed adhesions and also noted endometriosis implants which were cauterized.  She has tried a Mirena IUD and also has tried oral contraceptives.  She did not tolerate either of these..  She is very done with this and is requesting more permanent solution.  Pain is getting more intense.  Has it throughout the month and intercourse hurts.        Past Medical History:   Diagnosis Date     Endometriosis      S/P LEEP (loop electrosurgical excision procedure)       Current Outpatient Medications   Medication Sig Dispense Refill     acetaminophen (TYLENOL) 500 MG tablet Take 1,000 mg by mouth every 6 hours as needed for mild pain (Patient not taking: Reported on 10/14/2021)       ibuprofen (ADVIL/MOTRIN) 600 MG tablet Take 1 tablet (600 mg) by mouth every 6 hours as needed for moderate pain (Patient not taking: Reported on 2020) 80 tablet 1      Allergies   Allergen Reactions     Sulfa Drugs Hives      History   Smoking Status     Never Smoker   Smokeless Tobacco     Current User      Past Surgical History:   Procedure Laterality Date     AS ENLARGE BREAST WITH IMPLANT       silicone implants      SECTION      X2      SECTION N/A 2018    Procedure:  SECTION;   SECTION;  Surgeon: Ranjith King MD;  Location:  [facility]     CYSTECTOMY OVARIAN BENIGN       DILATION AND CURETTAGE, HYSTEROSCOPY, LAPAROSCOPY, COMBINED N/A 3/6/2017    Procedure: COMBINED DILATION AND CURETTAGE, HYSTEROSCOPY, LAPAROSCOPY;  Surgeon: Ranjith King MD;  Location:  OR     ENT SURGERY      wisdom teeth extraction      "GYN SURGERY      laparoscopy x 4     LAPAROSCOPIC ABLATION ENDOMETRIOSIS  3/6/2017    Procedure: LAPAROSCOPIC ABLATION ENDOMETRIOSIS;  Surgeon: Ranjith King MD;  [facility]     LAPAROSCOPIC LYSIS ADHESIONS N/A 3/6/2017    Procedure: LAPAROSCOPIC LYSIS ADHESIONS;  Surgeon: Ranjith King MD;  Location: [facility]     LEEP TX, CERVICAL  2010     ORTHOPEDIC SURGERY      ankle edwin surgery (tendon repair)      Social History     Tobacco Use     Smoking status: Never Smoker     Smokeless tobacco: Current User   Substance Use Topics     Alcohol use: Yes     Alcohol/week: 2.0 standard drinks     Types: 2 Glasses of wine per week     Comment: occas     Drug use: No      Family History   Problem Relation Age of Onset     Other Cancer Maternal Grandmother         ovarian     Diabetes Maternal Grandmother      Diabetes Maternal Grandfather      Colon Cancer Paternal Grandfather      Seasonal/Environmental Allergies Son      Allergy (Severe) Brother         dairy and shellfish     Asthma Brother        ROS: A 12 point review of systems is negative except for the following:  See above       Physical Exam: /80   Pulse 107   Temp 97.3  F (36.3  C) (Temporal)   Resp 18   Ht 1.638 m (5' 4.5\")   Wt 59.9 kg (132 lb)   SpO2 98%   BMI 22.31 kg/m    HEENT:    Sclerae and conjunctiva are normal.    Ear canals and TMs look normal.  Nasal mucosa is pink  - no polyps or masses seen.  Throat is unremarkable . Mucous membranes are moist.   Neck is supple, mobile, no adenopathy or masses palpable. The thyroid feels normal.   Normal range of motion noted.  Chest is clear to auscultation.  No wheezes, rales or rhonchi heard.  Cardiac exam is normal with s1, s2, no murmurs or adventitious sounds.Normal rate and rhythm is heard.   Abdomen is soft,  nondistended, No masses felt.No HSM. No guarding or rigidity or rebound   noted. Palpation reveals  no    tenderness   Normal bowel sounds heard.   Pelvic exam:My " nurse Evelyn   was present to chaperone the exam.  The external genitalia appeared normal.    The vaginal vault was without bleeding  or   discharge   or odor.   The cervix was smooth    No vaginal support defects were noted,    Bimanual exam revealed a  6 week   sized uterus. It does not   descend   well in the vaginal vault.    No adnexal masses were felt.  Pap smear was not repeated today because it is not due until next year.  There was no  cervical motion tenderness.  However there was pain to manipulation of the uterus and this reproduces the pelvic pain she has been feeling so I do suspect she has recurrent endometriosis or adenomyosis.  Exam was NOT  limited by the patient's body habitus.  However it was limited by guarding.        The breast exam was declined.      We did discuss the option for an exam   and I suggested that she should be doing a self-breast exam   monthly and after the age of 40 a yearly mammogram   and she feels comfortable that this is sufficient.             Assessment/Plan:    The yearly exam is normal except for 1.  Pelvic  pain-most likely related to recurrent endometriosis.  I will obtain a pelvic ultrasound to evaluate this further.  She is requesting permanent solution and we will discuss surgery which would be hysterectomy because she is not willing to consider hormone therapy anymore.  Check with me after ultrasound is done.      Additional Plan:Diet and rest and exercise discussed.      I have advised going for a 5 mile walk daily if possible       See labs and orders.    .Immunizations reviewed(TDAP, pneumovax, shingles vaccine,etc)and discussed-including advice for yearly flu shot/tetanus update.     The following vaccines given today:   Flu vax    Hepatitis C and HIV testing offered    these tests were declined.     Calcium supplementation advised     Colonoscopy at age 50      Mammogram  Is advised beginning at age 40-pt to be responsible for getting this done     DEXA is  advised beginning at age 65      Labs done: see orders  -lipids    I advised the following exams with specialists:    1. Dental evaluation yearly    2. Dermatology evaluation for mole exam yearly    3. Ophthalmology exam yearly to check for glaucoma, etc          Living will was discussed.         Followup :  1 week- to discuss US results-      In addition to the routine physical exam today I spent 32 minutes reviewing her chart regarding her history of endometriosis and the current pain she is having and making a plan for management of this pain.   SRUTHI King MD(electronic signature)

## 2021-10-15 ENCOUNTER — HOSPITAL ENCOUNTER (OUTPATIENT)
Dept: ULTRASOUND IMAGING | Facility: CLINIC | Age: 36
Discharge: HOME | End: 2021-10-15
Attending: OBSTETRICS & GYNECOLOGY | Admitting: OBSTETRICS & GYNECOLOGY
Payer: COMMERCIAL

## 2021-10-15 DIAGNOSIS — R10.2 PELVIC PAIN IN FEMALE: ICD-10-CM

## 2021-10-15 PROCEDURE — 76830 TRANSVAGINAL US NON-OB: CPT

## 2021-10-18 NOTE — RESULT ENCOUNTER NOTE
This patient is planning to recheck soon. I had asked her   to set up another appointment to discuss results-      she has appt for  10/25/21.    Will discuss these normal   results then.SRUTHI King MD

## 2021-10-21 ENCOUNTER — LAB (OUTPATIENT)
Dept: LAB | Facility: CLINIC | Age: 36
End: 2021-10-21
Payer: COMMERCIAL

## 2021-10-21 DIAGNOSIS — Z00.00 WELL ADULT EXAM: ICD-10-CM

## 2021-10-21 LAB
CHOLEST SERPL-MCNC: 145 MG/DL
FASTING STATUS PATIENT QL REPORTED: YES
HDLC SERPL-MCNC: 80 MG/DL
LDLC SERPL CALC-MCNC: 51 MG/DL
NONHDLC SERPL-MCNC: 65 MG/DL
TRIGL SERPL-MCNC: 68 MG/DL

## 2021-10-21 PROCEDURE — 36415 COLL VENOUS BLD VENIPUNCTURE: CPT

## 2021-10-21 PROCEDURE — 80061 LIPID PANEL: CPT

## 2021-10-25 ENCOUNTER — VIRTUAL VISIT (OUTPATIENT)
Dept: FAMILY MEDICINE | Facility: CLINIC | Age: 36
End: 2021-10-25
Payer: COMMERCIAL

## 2021-10-25 ENCOUNTER — MYC MEDICAL ADVICE (OUTPATIENT)
Dept: FAMILY MEDICINE | Facility: CLINIC | Age: 36
End: 2021-10-25

## 2021-10-25 DIAGNOSIS — R10.2 PELVIC PAIN IN FEMALE: Primary | ICD-10-CM

## 2021-10-25 PROCEDURE — 99213 OFFICE O/P EST LOW 20 MIN: CPT | Mod: TEL | Performed by: OBSTETRICS & GYNECOLOGY

## 2021-10-25 NOTE — PROGRESS NOTES
Subjective: Debbie requested a phone consultation today because of concerns regarding pelvic pain. Due to the current covid-19 coronavirus epidemic we are managing much of our patients' concerns remotely when possible.    She had a recent pelvic ultrasound which showed  FINDINGS:     UTERUS: 9.3 x 4.5 x 7.4 cm. Normal in size and position with no  masses.     ENDOMETRIUM: 7 mm. Normal smooth endometrium.     RIGHT OVARY: 3.4 x 12.2 x 1.9 cm. Simple cyst measures 2.0 x 1.5 x 1.6  cm, no follow-up needed. Ovarian flow demonstrated.     LEFT OVARY: 2.8 x 1.7 x 1.3 cm. Normal with flow demonstrated.     No significant free fluid.                                                                      IMPRESSION:  1.  Small cyst in the right ovary, no follow-up needed.  2.  Otherwise normal pelvic ultrasound.     Premenopausal asymptomatic simple cyst:     </= 3 cm: Normal, no follow-up.     Simple Adnexal Cysts: U Consensus Conference Update on Follow-up and  Reporting. Radiology 2019. [id number].     JEANETTE JACINTO MD          She has a history of endometriosis and has had 3 separate laparoscopies for this.  Did the most recent laparoscopy.  Lesions were lysed.  She has recurrent pelvic pain now and is fairly sure that the endometriosis has recurred.  She declines a more conservative management of this with hormones.  She is requesting hysterectomy.  We discussed the pros and cons of this today.  We also discussed whether to remove the ovaries.  I told her that if the endometriosis is severe then it makes most sense to review move both ovaries and leave her without any estrogen for 6 months to allow the endometriosis to burnout.  However if the endometriosis is mild then she could keep her ovaries intact and wait to see if she has recurrent problems.  If so then she might need to have a laparoscopy to remove the ovaries.  She has a history of 3 prior  sections which does increase her risk of needing  an open procedure.  Also increased risk of morbidity or complications from surgery including bladder injury.  I offered her a second opinion and she declined.  We have surgery tentatively planned for December 13 and she will come in to see me again in mid November to sign a consent and do a preop exam.      The past medical history and medications and allergies have been reviewed today by me.  .  Past Medical History:   Diagnosis Date     Endometriosis      S/P LEEP (loop electrosurgical excision procedure) 2010     Allergies   Allergen Reactions     Sulfa Drugs Hives     Current Outpatient Medications   Medication Sig Dispense Refill     acetaminophen (TYLENOL) 500 MG tablet Take 1,000 mg by mouth every 6 hours as needed for mild pain (Patient not taking: Reported on 10/14/2021)       ibuprofen (ADVIL/MOTRIN) 600 MG tablet Take 1 tablet (600 mg) by mouth every 6 hours as needed for moderate pain (Patient not taking: Reported on 9/9/2020) 80 tablet 1       Assessment/Plan:  See above-endometriosis.  Planning hysterectomy.      Phone call duration was   12  minutes.     SRUTHI King MD

## 2021-12-25 DIAGNOSIS — Z11.59 ENCOUNTER FOR SCREENING FOR OTHER VIRAL DISEASES: ICD-10-CM

## 2022-01-19 ENCOUNTER — OFFICE VISIT (OUTPATIENT)
Dept: FAMILY MEDICINE | Facility: CLINIC | Age: 37
End: 2022-01-19
Payer: COMMERCIAL

## 2022-01-19 VITALS
SYSTOLIC BLOOD PRESSURE: 100 MMHG | OXYGEN SATURATION: 98 % | TEMPERATURE: 98.7 F | HEIGHT: 64 IN | BODY MASS INDEX: 22.71 KG/M2 | HEART RATE: 86 BPM | WEIGHT: 133 LBS | RESPIRATION RATE: 18 BRPM | DIASTOLIC BLOOD PRESSURE: 58 MMHG

## 2022-01-19 DIAGNOSIS — Z01.818 PREOP EXAMINATION: Primary | ICD-10-CM

## 2022-01-19 DIAGNOSIS — N80.9 ENDOMETRIOSIS: ICD-10-CM

## 2022-01-19 DIAGNOSIS — R10.2 PELVIC PAIN IN FEMALE: ICD-10-CM

## 2022-01-19 PROCEDURE — 99215 OFFICE O/P EST HI 40 MIN: CPT | Performed by: OBSTETRICS & GYNECOLOGY

## 2022-01-19 ASSESSMENT — PAIN SCALES - GENERAL: PAINLEVEL: EXTREME PAIN (8)

## 2022-01-19 ASSESSMENT — MIFFLIN-ST. JEOR: SCORE: 1288.28

## 2022-01-19 NOTE — H&P (VIEW-ONLY)
1. No - Have you ever had a heart attack or stroke?  2. No - Have you ever had surgery on your heart or blood vessels, such as a stent, coronary (heart) bypass, or surgery on an artery in the head, neck, heart, or legs?  3. No - Do you have chest pain when you are physically active?  4. No - Do you have a history of heart failure?  5. No - Do you currently have a cold, bronchitis, or symptoms of other respiratory (head and chest) infections?  6. No - Do you have a cough, shortness of breath, or wheezing?  7. No - Do you or anyone in your family have a history of blood clots?  8. No - Do you or anyone in your family have a serious bleeding problem, such as long-lasting bleeding after surgeries or cuts?  9. No - Have you ever had anemia or been told to take iron pills?  10. No - Have you had any abnormal blood loss such as black, tarry or bloody stools, or abnormal vaginal bleeding?  11. No - Have you ever had a blood transfusion?  12. Yes - Are you willing to have a blood transfusion if it is medically needed before, during, or after your surgery?  13. No - Have you or anyone in your family ever had problems with anesthesia (sedation for surgery)?  14. No - Do you have sleep apnea, excessive snoring, or daytime drowsiness?   15. NO  - Do you have any artifical heart valves or other implanted medical devices, such as a pacemaker, defibrillator, or continuous glucose monitor?  16. No - Do you have any artifical joints?  17. No - Are you allergic to latex?  18. No - Is there any chance that you may be pregnant?   She states she has breast implants.     PREOP GYN CONSULT FOR HYSTERECTOMY    PATIENT PROFILE/INDICATION FOR SURGERY:   36-year-old female  3 para 3 with recurrent pelvic pain consistent with previous attacks of endometriosis.  She is requesting hysterectomy    ASSESSMENT: Pelvic pain-endometriosis-failed conservative management.    PLAN:   HYSTERECTOMY-SEE BELOW-    REFERRING PHYSICIAN:  Christine  AGE:  " 36 year old  HEIGHT:  5' 4\"  WEIGHT:  133 lbs 0 oz  ALLERGIES:     Allergies   Allergen Reactions     Sulfa Drugs Hives       ULTRASOUND FINDINGS:     FINDINGS:     UTERUS: 9.3 x 4.5 x 7.4 cm. Normal in size and position with no  masses.     ENDOMETRIUM: 7 mm. Normal smooth endometrium.     RIGHT OVARY: 3.4 x 12.2 x 1.9 cm. Simple cyst measures 2.0 x 1.5 x 1.6  cm, no follow-up needed. Ovarian flow demonstrated.     LEFT OVARY: 2.8 x 1.7 x 1.3 cm. Normal with flow demonstrated.     No significant free fluid.                                                                      IMPRESSION:  1.  Small cyst in the right ovary, no follow-up needed.  2.  Otherwise normal pelvic ultrasound.     Premenopausal asymptomatic simple cyst:     </= 3 cm: Normal, no follow-up.     Simple Adnexal Cysts: U Consensus Conference Update on Follow-up and  Reporting. Radiology September 2019. [id number].     JEANETTE JACINTO MD          Past Medical History:   Diagnosis Date     Endometriosis      S/P LEEP (loop electrosurgical excision procedure) 2010     PLANNED SURGERY: Total laparoscopic hysterectomy and bilateral salpingectomy    PLAN FOR INCISION:  Probable pfannenstiel incision, if incision is necessary. Final determination will be made at the time of surgery.      I did inform the patient that the final decision about incision will be based on my examination under anesthesia.  I will use clinical judgment regarding how much surgical exposure is necessary and then determine if a midline or a transverse incision is best.    PLAN REGARDING HER CERVIX:   Remove if possible      The patient did read over an information sheet regarding whether to remove the cervix with the uterus or keep it intact.  I explained that there are some controversies surrounding whether the cervix is involved in sexual orgasm.  Therefore, some women choose to keep it.  Others feel that keeping the cervix contributes to the pelvic support of the vaginal " apex and cuff.  However, she understands that if the cervix is kept, a Pap should be done at regular intervals to rule out cervical dysplasia.  Keeping the cervix is not generally advised for women with previously-abnormal Paps.  Also, retaining the cervix might lead to minor spotting at the time of menses because there is uterine tissue in the cervix.  If she decides to have the cervix removed at a future date, it will be more challenging surgery because the bladder may adhere to the back side of it, requiring surgical expertise at another institution to perform a trachelectomy.  She was advised to read more about this on line.  I explained that sometimes it is quite difficult to remove the cervix especially if there has been previous surgery such as a  section. If we stop short of removing the cervix it would be due to a safety concern such as excessive bleeding.     PLAN REGARDING OVARIES: Keep intact unless the endometriosis is really severe and at that point I would consider removing 1 or both and she will let me use my clinical judgment.    We had a discussion about whether to keep the ovaries or remove them with surgery.  She knows that by keeping the ovaries, there is a 5% chance of needing another surgery in the future to remove one or both if a cyst or cancer develops.  If the ovaries are taken, then menopause ensues.  We discussed the symptoms and risks of this, including risks of taking hormone replacement.    Bladder symptoms:   There are no   symptoms of stress urinary incontinence.      PLAN REGARDING FALLOPIAN TUBES:  Remove if possible           We discussed that recent evidence has shown that removal of the fallopian tubes reduces the risk of ovarian cancer.  Therefore, I usually advise removing the tubes if they are accessible  but only if the procedure does not involve excessive risk due to adhesions or some other anatomical consideration.       RISKS DISCUSSION:         She was given  several  detailed pamphlets  describing the surgery and the more common risks involved.  We discussed the risks, which include but are not limited to bleeding, infection, possible injury to the organs, including the bowel, bladder, ureters and other internal organs, possible need for cystoscopy, possible vesicovaginal fistula or rectovaginal fistula formation, possible nerve paresthesias due to retractor placement, possible changes in sexual function and changes in urination and anesthesia risks.   After answering her questions, I handed her a consent form and allowed her to review it thoroughly.  She was offered a second opinion and declined.   She understands that if a bladder injury is discovered it will be addressed but she may need to go home with a Babb catheter in her bladder for one or more weeks to allow the bladder or other tissues to heal.  There is the option of simply starting with an open surgery which might lower her risk of bladder injury but she does want a laparoscopy even if it means slightly higher risk of bladder injury.The risk of bladder injury with open surgery is generally 1-2% and the risk with laparoscopy can be anywhere from 2-5%. We usually plan this surgery for a day when our urologist is present but there are infrequent occasions when the urologist has changed plans and is not available that day. I will likely perform a cystoscopy to look into the bladder after the surgery to inspect the ureter openings for urine flow and also to make sure the bladder has no lacerations or sutures present. Sometimes that is difficult to tell even with cystoscopy.She signed the form witnessed by my nurse.  I signed it as well.     HOME SUPPORT:  She understands that she will need some help around the home for the first several weeks after surgery.    EXPECTATIONS:  I told her that while most surgeries go smoothly, a perfectly-smooth course should not be assumed.  She should expect some form of  inconvenience along the way.  It might be a minor wound infection or a slow return of bowel function or perhaps a more serious concern, such as pneumonia or blood clot.  At any rate, I and my partners will follow her through these unanticipated obstacles.  She knows to expect 3 or 4 days in the hospital if open laparotomy is done and 6 weeks at home recuperating and 3-4 months until full recovery occurs. If laparoscopy is successful she may be discharged to home as same-day surgery. She will need to limit lifting to 20 pounds for 6-12 weeks after surgery depending upon what is done.  I advised her to limit visitors on the first hospital day to allow her pain medications to work more effectively.  She may  undergo a preoperative bowel prep and clear-liquid diet 1 day prior to surgery.   She knows to be n.p.o. 8 hours before the surgery.  She will have preoperative laboratory work.  If it is abnormal, I may choose to postpone her surgery.    Her preop exam will be done by SRUTHI King MD      After discussing these issues today and on previous occasions, I feel that she has a very-thorough understanding of the indications for surgery and the possible risks associated with it.        Because of her previous abdominal surgery she knows that she is under greater than average surgical risk due to anticipated adhesions from prior surgery.  She has had 3 prior  sections as well as multiple laparoscopies.  Therefore she does have increased risk of adhesions and she is aware that this especially increases her risk of bladder injury.        A total of 25 minutes were spent face-to-face with this patient (before the preop exam) during today's consultation, with all of that  of that time devoted to conversation and counseling about the management decisions.    Note:  AFTER  The consenting was done, i proceeded with the preop exam, which added an additional 15 minutes to the exam time. See results below:    Preoperative  History and Physical    Chief complaint/indicated for surgery/planned surgery:    Debbie is planning to undergo Total laparoscopic hysterectomy   by Dr King.    Past Medical History:   Diagnosis Date     Endometriosis      S/P LEEP (loop electrosurgical excision procedure)      Current Outpatient Medications   Medication Sig Dispense Refill     acetaminophen (TYLENOL) 500 MG tablet Take 1,000 mg by mouth every 6 hours as needed for mild pain (Patient not taking: Reported on 10/14/2021)       ibuprofen (ADVIL/MOTRIN) 600 MG tablet Take 1 tablet (600 mg) by mouth every 6 hours as needed for moderate pain (Patient not taking: Reported on 2020) 80 tablet 1       There has been no recent use of OTC or herbal medications.   The patient denies any recent ASA or NSAID use.   The patient denies any recent steroid use within the last 6 months.   The patient denies any alcohol or drug use.     Allergies   Allergen Reactions     Sulfa Drugs Hives     History   Smoking Status     Never Smoker   Smokeless Tobacco     Current User     Past Surgical History:   Procedure Laterality Date     AS ENLARGE BREAST WITH IMPLANT       silicone implants      SECTION      X2      SECTION N/A 2018    Procedure:  SECTION;   SECTION;  Surgeon: Ranjith King MD;  Location:  [facility]     CYSTECTOMY OVARIAN BENIGN       DILATION AND CURETTAGE, HYSTEROSCOPY, LAPAROSCOPY, COMBINED N/A 3/6/2017    Procedure: COMBINED DILATION AND CURETTAGE, HYSTEROSCOPY, LAPAROSCOPY;  Surgeon: Ranjith King MD;  Location:  OR     ENT SURGERY      wisdom teeth extraction     GYN SURGERY      laparoscopy x 4     LAPAROSCOPIC ABLATION ENDOMETRIOSIS  3/6/2017    Procedure: LAPAROSCOPIC ABLATION ENDOMETRIOSIS;  Surgeon: Ranjith King MD;  Location:  OR     LAPAROSCOPIC LYSIS ADHESIONS N/A 3/6/2017    Procedure: LAPAROSCOPIC LYSIS ADHESIONS;  Surgeon: Ranjith King MD;   Location:  OR     LEEP TX, CERVICAL  2010     ORTHOPEDIC SURGERY      ankle edwin surgery (tendon repair)     Social History     Socioeconomic History     Marital status:      Spouse name: Not on file     Number of children: Not on file     Years of education: Not on file     Highest education level: Not on file   Occupational History     Not on file   Tobacco Use     Smoking status: Never Smoker     Smokeless tobacco: Current User   Vaping Use     Vaping Use: Never used   Substance and Sexual Activity     Alcohol use: Yes     Alcohol/week: 2.0 standard drinks     Types: 2 Glasses of wine per week     Comment: occas     Drug use: No     Sexual activity: Yes     Partners: Male     Birth control/protection: Condom   Other Topics Concern     Parent/sibling w/ CABG, MI or angioplasty before 65F 55M? Not Asked   Social History Narrative    1/2018  Lives in [place] with Pradeep and their two sons.  No smokers in the home.  No indoor cats/kittens.  No concerns about domestic violence.  Debbie is a .          Social Determinants of Health     Financial Resource Strain: Not on file   Food Insecurity: Not on file   Transportation Needs: Not on file   Physical Activity: Not on file   Stress: Not on file   Social Connections: Not on file   Intimate Partner Violence: Not on file   Housing Stability: Not on file     Family History   Problem Relation Age of Onset     Other Cancer Maternal Grandmother         ovarian     Diabetes Maternal Grandmother      Diabetes Maternal Grandfather      Colon Cancer Paternal Grandfather      Seasonal/Environmental Allergies Son      Allergy (Severe) Brother         dairy and shellfish     Asthma Brother        There is no family history of anesthesia reactions or malignant hyperthermia or psevdocholinestergse problem or porphyria.    Review Of Systems  Skin: negative  Eyes: negative  Ears/Nose/Throat: negative  Respiratory: No shortness of breath, dyspnea on exertion, cough,  "or hemoptysis  Cardiovascular: negative  Gastrointestinal: negative  Genitourinary: negative  Musculoskeletal: negative  Neurologic: negative  Psychiatric: negative  Hematologic/Lymphatic/Immunologic: negative  Endocrine: negative    Physical Exam:/58   Pulse 86   Temp 98.7  F (37.1  C) (Temporal)   Resp 18   Ht 1.626 m (5' 4\")   Wt 60.3 kg (133 lb)   LMP 01/19/2022   SpO2 98%   Breastfeeding No   BMI 22.83 kg/m      HEENT:    Sclerae and conjunctiva are normal.    Ear canals and TMs look normal.  Nasal mucosa is pink  - no polyps or masses seen.  Throat is unremarkable . Mucous membranes are moist.     Neck is supple, mobile, no adenopathy or masses palpable. The thyroid feels normal.   Normal range of motion noted.    Chest is clear to auscultation.  No wheezes, rales or rhonchi heard.  Cardiac exam is normal with s1, s2, no murmurs or adventitious sounds.Normal rate and rhythm is heard.     Abdomen is soft,  nondistended, No masses felt.No HSM. No guarding or rigidity or rebound   noted. Palpation reveals  no    tenderness   Normal bowel sounds heard.     Extremities are pink and there is no cyanosis and there is no edema. Pulses are physiologic.    The neurologic exam is grossly intact.Motor and sensory exams are grossly normal. Cranial nerves 2-12 are intact. Cerebellar exam is normal.           Other:  Labs: CBC and creatinine and type and screen will be done on the Friday prior to surgery along with a COVID test.    Assessment/Impression:  1.  Pelvic pain with history of endometriosis.  I suspect endometriosis has recurred at this time.  She has failed conservative management.  Requesting hysterectomy.    2.Preoperative  Examination: The patient is at LOW   risk for general anesthesia for the proposed surgery.        Recommendations:  Approval given for general anesthesia.    Medications are to be stopped before surgery.   Discontinue ASA and NSAIDS 5 days prior to surgery to reduce bleeding " risk.      A total of 48 minutes were spent in today's visit with the patient in addition to the time spent charting on this patient today.      SRUTHI King MD

## 2022-01-19 NOTE — PROGRESS NOTES
1. No - Have you ever had a heart attack or stroke?  2. No - Have you ever had surgery on your heart or blood vessels, such as a stent, coronary (heart) bypass, or surgery on an artery in the head, neck, heart, or legs?  3. No - Do you have chest pain when you are physically active?  4. No - Do you have a history of heart failure?  5. No - Do you currently have a cold, bronchitis, or symptoms of other respiratory (head and chest) infections?  6. No - Do you have a cough, shortness of breath, or wheezing?  7. No - Do you or anyone in your family have a history of blood clots?  8. No - Do you or anyone in your family have a serious bleeding problem, such as long-lasting bleeding after surgeries or cuts?  9. No - Have you ever had anemia or been told to take iron pills?  10. No - Have you had any abnormal blood loss such as black, tarry or bloody stools, or abnormal vaginal bleeding?  11. No - Have you ever had a blood transfusion?  12. Yes - Are you willing to have a blood transfusion if it is medically needed before, during, or after your surgery?  13. No - Have you or anyone in your family ever had problems with anesthesia (sedation for surgery)?  14. No - Do you have sleep apnea, excessive snoring, or daytime drowsiness?   15. NO  - Do you have any artifical heart valves or other implanted medical devices, such as a pacemaker, defibrillator, or continuous glucose monitor?  16. No - Do you have any artifical joints?  17. No - Are you allergic to latex?  18. No - Is there any chance that you may be pregnant?   She states she has breast implants.     PREOP GYN CONSULT FOR HYSTERECTOMY    PATIENT PROFILE/INDICATION FOR SURGERY:   36-year-old female  3 para 3 with recurrent pelvic pain consistent with previous attacks of endometriosis.  She is requesting hysterectomy    ASSESSMENT: Pelvic pain-endometriosis-failed conservative management.    PLAN:   HYSTERECTOMY-SEE BELOW-    REFERRING PHYSICIAN:  Christine  AGE:  " 36 year old  HEIGHT:  5' 4\"  WEIGHT:  133 lbs 0 oz  ALLERGIES:     Allergies   Allergen Reactions     Sulfa Drugs Hives       ULTRASOUND FINDINGS:     FINDINGS:     UTERUS: 9.3 x 4.5 x 7.4 cm. Normal in size and position with no  masses.     ENDOMETRIUM: 7 mm. Normal smooth endometrium.     RIGHT OVARY: 3.4 x 12.2 x 1.9 cm. Simple cyst measures 2.0 x 1.5 x 1.6  cm, no follow-up needed. Ovarian flow demonstrated.     LEFT OVARY: 2.8 x 1.7 x 1.3 cm. Normal with flow demonstrated.     No significant free fluid.                                                                      IMPRESSION:  1.  Small cyst in the right ovary, no follow-up needed.  2.  Otherwise normal pelvic ultrasound.     Premenopausal asymptomatic simple cyst:     </= 3 cm: Normal, no follow-up.     Simple Adnexal Cysts: U Consensus Conference Update on Follow-up and  Reporting. Radiology September 2019. [id number].     JEANETTE JACINTO MD          Past Medical History:   Diagnosis Date     Endometriosis      S/P LEEP (loop electrosurgical excision procedure) 2010     PLANNED SURGERY: Total laparoscopic hysterectomy and bilateral salpingectomy    PLAN FOR INCISION:  Probable pfannenstiel incision, if incision is necessary. Final determination will be made at the time of surgery.      I did inform the patient that the final decision about incision will be based on my examination under anesthesia.  I will use clinical judgment regarding how much surgical exposure is necessary and then determine if a midline or a transverse incision is best.    PLAN REGARDING HER CERVIX:   Remove if possible      The patient did read over an information sheet regarding whether to remove the cervix with the uterus or keep it intact.  I explained that there are some controversies surrounding whether the cervix is involved in sexual orgasm.  Therefore, some women choose to keep it.  Others feel that keeping the cervix contributes to the pelvic support of the vaginal " apex and cuff.  However, she understands that if the cervix is kept, a Pap should be done at regular intervals to rule out cervical dysplasia.  Keeping the cervix is not generally advised for women with previously-abnormal Paps.  Also, retaining the cervix might lead to minor spotting at the time of menses because there is uterine tissue in the cervix.  If she decides to have the cervix removed at a future date, it will be more challenging surgery because the bladder may adhere to the back side of it, requiring surgical expertise at another institution to perform a trachelectomy.  She was advised to read more about this on line.  I explained that sometimes it is quite difficult to remove the cervix especially if there has been previous surgery such as a  section. If we stop short of removing the cervix it would be due to a safety concern such as excessive bleeding.     PLAN REGARDING OVARIES: Keep intact unless the endometriosis is really severe and at that point I would consider removing 1 or both and she will let me use my clinical judgment.    We had a discussion about whether to keep the ovaries or remove them with surgery.  She knows that by keeping the ovaries, there is a 5% chance of needing another surgery in the future to remove one or both if a cyst or cancer develops.  If the ovaries are taken, then menopause ensues.  We discussed the symptoms and risks of this, including risks of taking hormone replacement.    Bladder symptoms:   There are no   symptoms of stress urinary incontinence.      PLAN REGARDING FALLOPIAN TUBES:  Remove if possible           We discussed that recent evidence has shown that removal of the fallopian tubes reduces the risk of ovarian cancer.  Therefore, I usually advise removing the tubes if they are accessible  but only if the procedure does not involve excessive risk due to adhesions or some other anatomical consideration.       RISKS DISCUSSION:         She was given  several  detailed pamphlets  describing the surgery and the more common risks involved.  We discussed the risks, which include but are not limited to bleeding, infection, possible injury to the organs, including the bowel, bladder, ureters and other internal organs, possible need for cystoscopy, possible vesicovaginal fistula or rectovaginal fistula formation, possible nerve paresthesias due to retractor placement, possible changes in sexual function and changes in urination and anesthesia risks.   After answering her questions, I handed her a consent form and allowed her to review it thoroughly.  She was offered a second opinion and declined.   She understands that if a bladder injury is discovered it will be addressed but she may need to go home with a Babb catheter in her bladder for one or more weeks to allow the bladder or other tissues to heal.  There is the option of simply starting with an open surgery which might lower her risk of bladder injury but she does want a laparoscopy even if it means slightly higher risk of bladder injury.The risk of bladder injury with open surgery is generally 1-2% and the risk with laparoscopy can be anywhere from 2-5%. We usually plan this surgery for a day when our urologist is present but there are infrequent occasions when the urologist has changed plans and is not available that day. I will likely perform a cystoscopy to look into the bladder after the surgery to inspect the ureter openings for urine flow and also to make sure the bladder has no lacerations or sutures present. Sometimes that is difficult to tell even with cystoscopy.She signed the form witnessed by my nurse.  I signed it as well.     HOME SUPPORT:  She understands that she will need some help around the home for the first several weeks after surgery.    EXPECTATIONS:  I told her that while most surgeries go smoothly, a perfectly-smooth course should not be assumed.  She should expect some form of  inconvenience along the way.  It might be a minor wound infection or a slow return of bowel function or perhaps a more serious concern, such as pneumonia or blood clot.  At any rate, I and my partners will follow her through these unanticipated obstacles.  She knows to expect 3 or 4 days in the hospital if open laparotomy is done and 6 weeks at home recuperating and 3-4 months until full recovery occurs. If laparoscopy is successful she may be discharged to home as same-day surgery. She will need to limit lifting to 20 pounds for 6-12 weeks after surgery depending upon what is done.  I advised her to limit visitors on the first hospital day to allow her pain medications to work more effectively.  She may  undergo a preoperative bowel prep and clear-liquid diet 1 day prior to surgery.   She knows to be n.p.o. 8 hours before the surgery.  She will have preoperative laboratory work.  If it is abnormal, I may choose to postpone her surgery.    Her preop exam will be done by SRUTHI King MD      After discussing these issues today and on previous occasions, I feel that she has a very-thorough understanding of the indications for surgery and the possible risks associated with it.        Because of her previous abdominal surgery she knows that she is under greater than average surgical risk due to anticipated adhesions from prior surgery.  She has had 3 prior  sections as well as multiple laparoscopies.  Therefore she does have increased risk of adhesions and she is aware that this especially increases her risk of bladder injury.        A total of 25 minutes were spent face-to-face with this patient (before the preop exam) during today's consultation, with all of that  of that time devoted to conversation and counseling about the management decisions.    Note:  AFTER  The consenting was done, i proceeded with the preop exam, which added an additional 15 minutes to the exam time. See results below:    Preoperative  History and Physical    Chief complaint/indicated for surgery/planned surgery:    Debbie is planning to undergo Total laparoscopic hysterectomy   by Dr King.    Past Medical History:   Diagnosis Date     Endometriosis      S/P LEEP (loop electrosurgical excision procedure)      Current Outpatient Medications   Medication Sig Dispense Refill     acetaminophen (TYLENOL) 500 MG tablet Take 1,000 mg by mouth every 6 hours as needed for mild pain (Patient not taking: Reported on 10/14/2021)       ibuprofen (ADVIL/MOTRIN) 600 MG tablet Take 1 tablet (600 mg) by mouth every 6 hours as needed for moderate pain (Patient not taking: Reported on 2020) 80 tablet 1       There has been no recent use of OTC or herbal medications.   The patient denies any recent ASA or NSAID use.   The patient denies any recent steroid use within the last 6 months.   The patient denies any alcohol or drug use.     Allergies   Allergen Reactions     Sulfa Drugs Hives     History   Smoking Status     Never Smoker   Smokeless Tobacco     Current User     Past Surgical History:   Procedure Laterality Date     AS ENLARGE BREAST WITH IMPLANT       silicone implants      SECTION      X2      SECTION N/A 2018    Procedure:  SECTION;   SECTION;  Surgeon: Ranjith King MD;  Location:  [facility]     CYSTECTOMY OVARIAN BENIGN       DILATION AND CURETTAGE, HYSTEROSCOPY, LAPAROSCOPY, COMBINED N/A 3/6/2017    Procedure: COMBINED DILATION AND CURETTAGE, HYSTEROSCOPY, LAPAROSCOPY;  Surgeon: Ranjith King MD;  Location:  OR     ENT SURGERY      wisdom teeth extraction     GYN SURGERY      laparoscopy x 4     LAPAROSCOPIC ABLATION ENDOMETRIOSIS  3/6/2017    Procedure: LAPAROSCOPIC ABLATION ENDOMETRIOSIS;  Surgeon: Ranjith King MD;  Location:  OR     LAPAROSCOPIC LYSIS ADHESIONS N/A 3/6/2017    Procedure: LAPAROSCOPIC LYSIS ADHESIONS;  Surgeon: Ranjith King MD;   Location:  OR     LEEP TX, CERVICAL  2010     ORTHOPEDIC SURGERY      ankle edwin surgery (tendon repair)     Social History     Socioeconomic History     Marital status:      Spouse name: Not on file     Number of children: Not on file     Years of education: Not on file     Highest education level: Not on file   Occupational History     Not on file   Tobacco Use     Smoking status: Never Smoker     Smokeless tobacco: Current User   Vaping Use     Vaping Use: Never used   Substance and Sexual Activity     Alcohol use: Yes     Alcohol/week: 2.0 standard drinks     Types: 2 Glasses of wine per week     Comment: occas     Drug use: No     Sexual activity: Yes     Partners: Male     Birth control/protection: Condom   Other Topics Concern     Parent/sibling w/ CABG, MI or angioplasty before 65F 55M? Not Asked   Social History Narrative    1/2018  Lives in [place] with Pradeep and their two sons.  No smokers in the home.  No indoor cats/kittens.  No concerns about domestic violence.  Debbie is a .          Social Determinants of Health     Financial Resource Strain: Not on file   Food Insecurity: Not on file   Transportation Needs: Not on file   Physical Activity: Not on file   Stress: Not on file   Social Connections: Not on file   Intimate Partner Violence: Not on file   Housing Stability: Not on file     Family History   Problem Relation Age of Onset     Other Cancer Maternal Grandmother         ovarian     Diabetes Maternal Grandmother      Diabetes Maternal Grandfather      Colon Cancer Paternal Grandfather      Seasonal/Environmental Allergies Son      Allergy (Severe) Brother         dairy and shellfish     Asthma Brother        There is no family history of anesthesia reactions or malignant hyperthermia or psevdocholinestergse problem or porphyria.    Review Of Systems  Skin: negative  Eyes: negative  Ears/Nose/Throat: negative  Respiratory: No shortness of breath, dyspnea on exertion, cough,  "or hemoptysis  Cardiovascular: negative  Gastrointestinal: negative  Genitourinary: negative  Musculoskeletal: negative  Neurologic: negative  Psychiatric: negative  Hematologic/Lymphatic/Immunologic: negative  Endocrine: negative    Physical Exam:/58   Pulse 86   Temp 98.7  F (37.1  C) (Temporal)   Resp 18   Ht 1.626 m (5' 4\")   Wt 60.3 kg (133 lb)   LMP 01/19/2022   SpO2 98%   Breastfeeding No   BMI 22.83 kg/m      HEENT:    Sclerae and conjunctiva are normal.    Ear canals and TMs look normal.  Nasal mucosa is pink  - no polyps or masses seen.  Throat is unremarkable . Mucous membranes are moist.     Neck is supple, mobile, no adenopathy or masses palpable. The thyroid feels normal.   Normal range of motion noted.    Chest is clear to auscultation.  No wheezes, rales or rhonchi heard.  Cardiac exam is normal with s1, s2, no murmurs or adventitious sounds.Normal rate and rhythm is heard.     Abdomen is soft,  nondistended, No masses felt.No HSM. No guarding or rigidity or rebound   noted. Palpation reveals  no    tenderness   Normal bowel sounds heard.     Extremities are pink and there is no cyanosis and there is no edema. Pulses are physiologic.    The neurologic exam is grossly intact.Motor and sensory exams are grossly normal. Cranial nerves 2-12 are intact. Cerebellar exam is normal.           Other:  Labs: CBC and creatinine and type and screen will be done on the Friday prior to surgery along with a COVID test.    Assessment/Impression:  1.  Pelvic pain with history of endometriosis.  I suspect endometriosis has recurred at this time.  She has failed conservative management.  Requesting hysterectomy.    2.Preoperative  Examination: The patient is at LOW   risk for general anesthesia for the proposed surgery.        Recommendations:  Approval given for general anesthesia.    Medications are to be stopped before surgery.   Discontinue ASA and NSAIDS 5 days prior to surgery to reduce bleeding " risk.      A total of 48 minutes were spent in today's visit with the patient in addition to the time spent charting on this patient today.      SRUTHI King MD

## 2022-01-21 ENCOUNTER — LAB (OUTPATIENT)
Dept: LAB | Facility: CLINIC | Age: 37
End: 2022-01-21
Payer: COMMERCIAL

## 2022-01-21 DIAGNOSIS — Z11.59 ENCOUNTER FOR SCREENING FOR OTHER VIRAL DISEASES: ICD-10-CM

## 2022-01-21 DIAGNOSIS — Z01.818 PREOP EXAMINATION: ICD-10-CM

## 2022-01-21 DIAGNOSIS — R10.2 PELVIC PAIN IN FEMALE: ICD-10-CM

## 2022-01-21 LAB
ABO/RH(D): NORMAL
ANTIBODY SCREEN: NEGATIVE
CREAT SERPL-MCNC: 0.63 MG/DL (ref 0.52–1.04)
ERYTHROCYTE [DISTWIDTH] IN BLOOD BY AUTOMATED COUNT: 12.4 % (ref 10–15)
GFR SERPL CREATININE-BSD FRML MDRD: >90 ML/MIN/1.73M2
HCT VFR BLD AUTO: 42.2 % (ref 35–47)
HGB BLD-MCNC: 14.1 G/DL (ref 11.7–15.7)
MCH RBC QN AUTO: 31.1 PG (ref 26.5–33)
MCHC RBC AUTO-ENTMCNC: 33.4 G/DL (ref 31.5–36.5)
MCV RBC AUTO: 93 FL (ref 78–100)
PLATELET # BLD AUTO: 270 10E3/UL (ref 150–450)
RBC # BLD AUTO: 4.54 10E6/UL (ref 3.8–5.2)
SPECIMEN EXPIRATION DATE: NORMAL
WBC # BLD AUTO: 4.3 10E3/UL (ref 4–11)

## 2022-01-21 PROCEDURE — 86850 RBC ANTIBODY SCREEN: CPT

## 2022-01-21 PROCEDURE — 36415 COLL VENOUS BLD VENIPUNCTURE: CPT

## 2022-01-21 PROCEDURE — U0005 INFEC AGEN DETEC AMPLI PROBE: HCPCS

## 2022-01-21 PROCEDURE — U0003 INFECTIOUS AGENT DETECTION BY NUCLEIC ACID (DNA OR RNA); SEVERE ACUTE RESPIRATORY SYNDROME CORONAVIRUS 2 (SARS-COV-2) (CORONAVIRUS DISEASE [COVID-19]), AMPLIFIED PROBE TECHNIQUE, MAKING USE OF HIGH THROUGHPUT TECHNOLOGIES AS DESCRIBED BY CMS-2020-01-R: HCPCS

## 2022-01-21 PROCEDURE — 86901 BLOOD TYPING SEROLOGIC RH(D): CPT

## 2022-01-21 PROCEDURE — 82565 ASSAY OF CREATININE: CPT

## 2022-01-21 PROCEDURE — 85027 COMPLETE CBC AUTOMATED: CPT

## 2022-01-21 PROCEDURE — 86900 BLOOD TYPING SEROLOGIC ABO: CPT

## 2022-01-22 LAB — SARS-COV-2 RNA RESP QL NAA+PROBE: NEGATIVE

## 2022-01-23 ENCOUNTER — ANESTHESIA EVENT (OUTPATIENT)
Dept: SURGERY | Facility: CLINIC | Age: 37
End: 2022-01-23
Payer: COMMERCIAL

## 2022-01-23 NOTE — ANESTHESIA PREPROCEDURE EVALUATION
Anesthesia Pre-Procedure Evaluation    Patient: Debbie Gage   MRN: [id number] : 1985        Preoperative Diagnosis: Pelvic pain in female [R10.2]    Procedure : Procedure(s):  HYSTERECTOMY, TOTAL, LAPAROSCOPIC, WITH BILATERAL SALPINGECTOMY          Past Medical History:   Diagnosis Date     Endometriosis      S/P LEEP (loop electrosurgical excision procedure)       Past Surgical History:   Procedure Laterality Date     AS ENLARGE BREAST WITH IMPLANT      2014 silicone implants      SECTION      X2      SECTION N/A 2018    Procedure:  SECTION;   SECTION;  Surgeon: Ranjith King MD;  Location: [facility]     CYSTECTOMY OVARIAN BENIGN       DILATION AND CURETTAGE, HYSTEROSCOPY, LAPAROSCOPY, COMBINED N/A 3/6/2017    Procedure: COMBINED DILATION AND CURETTAGE, HYSTEROSCOPY, LAPAROSCOPY;  Surgeon: Ranjith King MD;  Location: [facility]     ENT SURGERY      wisdom teeth extraction     GYN SURGERY      laparoscopy x 4     LAPAROSCOPIC ABLATION ENDOMETRIOSIS  3/6/2017    Procedure: LAPAROSCOPIC ABLATION ENDOMETRIOSIS;  Surgeon: Ranjith King MD;  Location: [facility]     LAPAROSCOPIC LYSIS ADHESIONS N/A 3/6/2017    Procedure: LAPAROSCOPIC LYSIS ADHESIONS;  Surgeon: Ranjith King MD;  Location: [facility]     LEEP TX, CERVICAL       ORTHOPEDIC SURGERY      ankle edwin surgery (tendon repair)      Allergies   Allergen Reactions     Sulfa Drugs Hives      Social History     Tobacco Use     Smoking status: Never Smoker     Smokeless tobacco: Current User   Substance Use Topics     Alcohol use: Yes     Alcohol/week: 2.0 standard drinks     Types: 2 Glasses of wine per week     Comment: occas      Wt Readings from Last 1 Encounters:   22 60.3 kg (133 lb)        Anesthesia Evaluation   Pt has had prior anesthetic. Type: MAC and General.    No history of anesthetic complications       ROS/MED HX  ENT/Pulmonary:  - neg pulmonary ROS      Neurologic:  - neg neurologic ROS     Cardiovascular:  - neg cardiovascular ROS     METS/Exercise Tolerance:     Hematologic:  - neg hematologic  ROS     Musculoskeletal:  - neg musculoskeletal ROS     GI/Hepatic:  - neg GI/hepatic ROS     Renal/Genitourinary:     (+) Nephrolithiasis ,     Endo:  - neg endo ROS     Psychiatric/Substance Use:  - neg psychiatric ROS     Infectious Disease:  - neg infectious disease ROS     Malignancy:  - neg malignancy ROS     Other:  - neg other ROS          Physical Exam    Airway  airway exam normal      Mallampati: II   TM distance: > 3 FB   Neck ROM: full   Mouth opening: > 3 cm    Respiratory Devices and Support         Dental       (+) caps    B=Bridge, C=Chipped, L=Loose, M=Missing    Cardiovascular   cardiovascular exam normal       Rhythm and rate: regular and normal     Pulmonary   pulmonary exam normal        breath sounds clear to auscultation           OUTSIDE LABS:  CBC:   Lab Results   Component Value Date    WBC 4.3 01/21/2022    WBC 9.2 08/21/2018    HGB 14.1 01/21/2022    HGB 9.7 (L) 08/22/2018    HCT 42.2 01/21/2022    HCT 33.8 (L) 08/21/2018     01/21/2022     08/21/2018     BMP:   Lab Results   Component Value Date     06/25/2018     02/26/2018    POTASSIUM 3.4 06/25/2018    POTASSIUM 3.7 02/26/2018    CHLORIDE 104 06/25/2018    CHLORIDE 102 02/26/2018    CO2 25 06/25/2018    CO2 27 02/26/2018    BUN 5 (L) 06/25/2018    BUN 7 02/26/2018    CR 0.63 01/21/2022    CR 0.53 06/25/2018    GLC 88 06/25/2018    GLC 89 02/26/2018     COAGS: No results found for: PTT, INR, FIBR  POC:   Lab Results   Component Value Date    HCG Positive (A) 01/15/2018     HEPATIC:   Lab Results   Component Value Date    ALBUMIN 2.9 (L) 06/25/2018    PROTTOTAL 6.8 06/25/2018    ALT 22 08/21/2018    AST 20 08/21/2018    ALKPHOS 80 06/25/2018    BILITOTAL 0.6 06/25/2018     OTHER:   Lab Results   Component Value Date    LACT 1.2 01/13/2018    MOLLY 8.2 (L) 06/25/2018     LIPASE 161 06/25/2018    AMYLASE 44 06/25/2018       Anesthesia Plan    ASA Status:  1   NPO Status:  NPO Appropriate    Anesthesia Type: General.     - Airway: ETT   Induction: Intravenous, Propofol.   Maintenance: Inhalation.        Consents    Anesthesia Plan(s) and associated risks, benefits, and realistic alternatives discussed. Questions answered and patient/representative(s) expressed understanding.    - Discussed:     - Discussed with:  Patient      - Extended Intubation/Ventilatory Support Discussed: No.      - Patient is DNR/DNI Status: No    Use of blood products discussed: No .     Postoperative Care    Pain management: IV analgesics, Oral pain medications, Multi-modal analgesia.   PONV prophylaxis: Ondansetron (or other 5HT-3), Dexamethasone or Solumedrol     Comments:    Other Comments: The risks and benefits of anesthesia, and the alternatives where applicable, have been discussed with the patient, and they wish to proceed.            CHRISSY Rodrigez CRNA

## 2022-01-24 ENCOUNTER — HOSPITAL ENCOUNTER (OUTPATIENT)
Facility: CLINIC | Age: 37
Discharge: HOME | End: 2022-01-24
Attending: OBSTETRICS & GYNECOLOGY | Admitting: OBSTETRICS & GYNECOLOGY
Payer: COMMERCIAL

## 2022-01-24 ENCOUNTER — ANESTHESIA (OUTPATIENT)
Dept: SURGERY | Facility: CLINIC | Age: 37
End: 2022-01-24
Payer: COMMERCIAL

## 2022-01-24 VITALS
TEMPERATURE: 97.7 F | OXYGEN SATURATION: 97 % | RESPIRATION RATE: 16 BRPM | HEART RATE: 86 BPM | SYSTOLIC BLOOD PRESSURE: 90 MMHG | DIASTOLIC BLOOD PRESSURE: 62 MMHG

## 2022-01-24 DIAGNOSIS — Z90.710 S/P HYSTERECTOMY: Primary | ICD-10-CM

## 2022-01-24 LAB — HCG UR QL: NEGATIVE

## 2022-01-24 PROCEDURE — 999N000141 HC STATISTIC PRE-PROCEDURE NURSING ASSESSMENT: Performed by: OBSTETRICS & GYNECOLOGY

## 2022-01-24 PROCEDURE — 250N000011 HC RX IP 250 OP 636: Performed by: NURSE ANESTHETIST, CERTIFIED REGISTERED

## 2022-01-24 PROCEDURE — 360N000077 HC SURGERY LEVEL 4, PER MIN: Performed by: OBSTETRICS & GYNECOLOGY

## 2022-01-24 PROCEDURE — 58571 TLH W/T/O 250 G OR LESS: CPT | Performed by: OBSTETRICS & GYNECOLOGY

## 2022-01-24 PROCEDURE — 710N000012 HC RECOVERY PHASE 2, PER MINUTE: Performed by: OBSTETRICS & GYNECOLOGY

## 2022-01-24 PROCEDURE — 250N000009 HC RX 250: Performed by: NURSE ANESTHETIST, CERTIFIED REGISTERED

## 2022-01-24 PROCEDURE — 250N000009 HC RX 250: Performed by: OBSTETRICS & GYNECOLOGY

## 2022-01-24 PROCEDURE — 272N000001 HC OR GENERAL SUPPLY STERILE: Performed by: OBSTETRICS & GYNECOLOGY

## 2022-01-24 PROCEDURE — 258N000003 HC RX IP 258 OP 636: Performed by: NURSE ANESTHETIST, CERTIFIED REGISTERED

## 2022-01-24 PROCEDURE — 250N000011 HC RX IP 250 OP 636: Performed by: OBSTETRICS & GYNECOLOGY

## 2022-01-24 PROCEDURE — 370N000017 HC ANESTHESIA TECHNICAL FEE, PER MIN: Performed by: OBSTETRICS & GYNECOLOGY

## 2022-01-24 PROCEDURE — 81025 URINE PREGNANCY TEST: CPT | Performed by: OBSTETRICS & GYNECOLOGY

## 2022-01-24 PROCEDURE — 250N000011 HC RX IP 250 OP 636

## 2022-01-24 PROCEDURE — 710N000010 HC RECOVERY PHASE 1, LEVEL 2, PER MIN: Performed by: OBSTETRICS & GYNECOLOGY

## 2022-01-24 PROCEDURE — 250N000013 HC RX MED GY IP 250 OP 250 PS 637: Performed by: NURSE ANESTHETIST, CERTIFIED REGISTERED

## 2022-01-24 PROCEDURE — 250N000025 HC SEVOFLURANE, PER MIN: Performed by: OBSTETRICS & GYNECOLOGY

## 2022-01-24 PROCEDURE — 88307 TISSUE EXAM BY PATHOLOGIST: CPT | Mod: TC | Performed by: OBSTETRICS & GYNECOLOGY

## 2022-01-24 RX ORDER — ONDANSETRON 4 MG/1
4 TABLET, ORALLY DISINTEGRATING ORAL EVERY 30 MIN PRN
Status: DISCONTINUED | OUTPATIENT
Start: 2022-01-24 | End: 2022-01-24 | Stop reason: HOSPADM

## 2022-01-24 RX ORDER — LIDOCAINE HYDROCHLORIDE 20 MG/ML
INJECTION, SOLUTION INFILTRATION; PERINEURAL PRN
Status: DISCONTINUED | OUTPATIENT
Start: 2022-01-24 | End: 2022-01-24

## 2022-01-24 RX ORDER — OXYCODONE HYDROCHLORIDE 5 MG/1
5 TABLET ORAL EVERY 4 HOURS PRN
Status: DISCONTINUED | OUTPATIENT
Start: 2022-01-24 | End: 2022-01-24 | Stop reason: HOSPADM

## 2022-01-24 RX ORDER — BUPIVACAINE HYDROCHLORIDE AND EPINEPHRINE 2.5; 5 MG/ML; UG/ML
INJECTION, SOLUTION INFILTRATION; PERINEURAL PRN
Status: DISCONTINUED | OUTPATIENT
Start: 2022-01-24 | End: 2022-01-24 | Stop reason: HOSPADM

## 2022-01-24 RX ORDER — PROPOFOL 10 MG/ML
INJECTION, EMULSION INTRAVENOUS CONTINUOUS PRN
Status: DISCONTINUED | OUTPATIENT
Start: 2022-01-24 | End: 2022-01-24

## 2022-01-24 RX ORDER — ALBUTEROL SULFATE 0.83 MG/ML
2.5 SOLUTION RESPIRATORY (INHALATION) EVERY 4 HOURS PRN
Status: DISCONTINUED | OUTPATIENT
Start: 2022-01-24 | End: 2022-01-24 | Stop reason: HOSPADM

## 2022-01-24 RX ORDER — HYDROMORPHONE HYDROCHLORIDE 1 MG/ML
0.4 INJECTION, SOLUTION INTRAMUSCULAR; INTRAVENOUS; SUBCUTANEOUS EVERY 5 MIN PRN
Status: DISCONTINUED | OUTPATIENT
Start: 2022-01-24 | End: 2022-01-24 | Stop reason: HOSPADM

## 2022-01-24 RX ORDER — ACETAMINOPHEN 325 MG/1
975 TABLET ORAL ONCE
Status: COMPLETED | OUTPATIENT
Start: 2022-01-24 | End: 2022-01-24

## 2022-01-24 RX ORDER — ONDANSETRON 4 MG/1
4-8 TABLET, ORALLY DISINTEGRATING ORAL EVERY 8 HOURS PRN
Qty: 4 TABLET | Refills: 0 | Status: SHIPPED | OUTPATIENT
Start: 2022-01-24

## 2022-01-24 RX ORDER — LIDOCAINE 40 MG/G
CREAM TOPICAL
Status: DISCONTINUED | OUTPATIENT
Start: 2022-01-24 | End: 2022-01-24 | Stop reason: HOSPADM

## 2022-01-24 RX ORDER — PROPOFOL 10 MG/ML
INJECTION, EMULSION INTRAVENOUS PRN
Status: DISCONTINUED | OUTPATIENT
Start: 2022-01-24 | End: 2022-01-24

## 2022-01-24 RX ORDER — CEFAZOLIN SODIUM 2 G/100ML
2 INJECTION, SOLUTION INTRAVENOUS
Status: COMPLETED | OUTPATIENT
Start: 2022-01-24 | End: 2022-01-24

## 2022-01-24 RX ORDER — AMOXICILLIN 250 MG
1-2 CAPSULE ORAL 2 TIMES DAILY
Qty: 30 TABLET | Refills: 0 | Status: SHIPPED | OUTPATIENT
Start: 2022-01-24

## 2022-01-24 RX ORDER — SODIUM CHLORIDE, SODIUM LACTATE, POTASSIUM CHLORIDE, CALCIUM CHLORIDE 600; 310; 30; 20 MG/100ML; MG/100ML; MG/100ML; MG/100ML
INJECTION, SOLUTION INTRAVENOUS CONTINUOUS
Status: DISCONTINUED | OUTPATIENT
Start: 2022-01-24 | End: 2022-01-24 | Stop reason: HOSPADM

## 2022-01-24 RX ORDER — IBUPROFEN 800 MG/1
800 TABLET, FILM COATED ORAL EVERY 6 HOURS PRN
Qty: 30 TABLET | Refills: 0 | Status: SHIPPED | OUTPATIENT
Start: 2022-01-24

## 2022-01-24 RX ORDER — ONDANSETRON 2 MG/ML
4 INJECTION INTRAMUSCULAR; INTRAVENOUS EVERY 30 MIN PRN
Status: DISCONTINUED | OUTPATIENT
Start: 2022-01-24 | End: 2022-01-24 | Stop reason: HOSPADM

## 2022-01-24 RX ORDER — DEXAMETHASONE SODIUM PHOSPHATE 10 MG/ML
INJECTION, SOLUTION INTRAMUSCULAR; INTRAVENOUS PRN
Status: DISCONTINUED | OUTPATIENT
Start: 2022-01-24 | End: 2022-01-24

## 2022-01-24 RX ORDER — FENTANYL CITRATE 50 UG/ML
50 INJECTION, SOLUTION INTRAMUSCULAR; INTRAVENOUS
Status: DISCONTINUED | OUTPATIENT
Start: 2022-01-24 | End: 2022-01-24 | Stop reason: HOSPADM

## 2022-01-24 RX ORDER — CEFAZOLIN SODIUM 2 G/100ML
2 INJECTION, SOLUTION INTRAVENOUS SEE ADMIN INSTRUCTIONS
Status: DISCONTINUED | OUTPATIENT
Start: 2022-01-24 | End: 2022-01-24 | Stop reason: HOSPADM

## 2022-01-24 RX ORDER — ONDANSETRON 2 MG/ML
INJECTION INTRAMUSCULAR; INTRAVENOUS PRN
Status: DISCONTINUED | OUTPATIENT
Start: 2022-01-24 | End: 2022-01-24

## 2022-01-24 RX ORDER — FENTANYL CITRATE 50 UG/ML
INJECTION, SOLUTION INTRAMUSCULAR; INTRAVENOUS PRN
Status: DISCONTINUED | OUTPATIENT
Start: 2022-01-24 | End: 2022-01-24

## 2022-01-24 RX ORDER — IBUPROFEN 800 MG/1
800 TABLET, FILM COATED ORAL ONCE
Status: DISCONTINUED | OUTPATIENT
Start: 2022-01-24 | End: 2022-01-24 | Stop reason: HOSPADM

## 2022-01-24 RX ORDER — ATROPA BELLADONNA AND OPIUM 16.2; 3 MG/1; MG/1
SUPPOSITORY RECTAL PRN
Status: DISCONTINUED | OUTPATIENT
Start: 2022-01-24 | End: 2022-01-24 | Stop reason: HOSPADM

## 2022-01-24 RX ORDER — OXYCODONE HYDROCHLORIDE 5 MG/1
5-10 TABLET ORAL EVERY 6 HOURS PRN
Qty: 12 TABLET | Refills: 0 | Status: SHIPPED | OUTPATIENT
Start: 2022-01-24

## 2022-01-24 RX ORDER — ACETAMINOPHEN 325 MG/1
975 TABLET ORAL ONCE
Status: DISCONTINUED | OUTPATIENT
Start: 2022-01-24 | End: 2022-01-24 | Stop reason: HOSPADM

## 2022-01-24 RX ORDER — FENTANYL CITRATE 50 UG/ML
50 INJECTION, SOLUTION INTRAMUSCULAR; INTRAVENOUS EVERY 5 MIN PRN
Status: DISCONTINUED | OUTPATIENT
Start: 2022-01-24 | End: 2022-01-24 | Stop reason: HOSPADM

## 2022-01-24 RX ORDER — MEPERIDINE HYDROCHLORIDE 25 MG/ML
12.5 INJECTION INTRAMUSCULAR; INTRAVENOUS; SUBCUTANEOUS
Status: DISCONTINUED | OUTPATIENT
Start: 2022-01-24 | End: 2022-01-24 | Stop reason: HOSPADM

## 2022-01-24 RX ORDER — ACETAMINOPHEN 325 MG/1
650 TABLET ORAL EVERY 6 HOURS PRN
Qty: 50 TABLET | Refills: 0 | Status: SHIPPED | OUTPATIENT
Start: 2022-01-24

## 2022-01-24 RX ADMIN — ROCURONIUM BROMIDE 10 MG: 50 INJECTION, SOLUTION INTRAVENOUS at 09:02

## 2022-01-24 RX ADMIN — PHENYLEPHRINE HYDROCHLORIDE 100 MCG: 10 INJECTION INTRAVENOUS at 07:40

## 2022-01-24 RX ADMIN — FENTANYL CITRATE 50 MCG: 50 INJECTION INTRAMUSCULAR; INTRAVENOUS at 09:37

## 2022-01-24 RX ADMIN — HYDROMORPHONE HYDROCHLORIDE 0.4 MG: 1 INJECTION, SOLUTION INTRAMUSCULAR; INTRAVENOUS; SUBCUTANEOUS at 10:00

## 2022-01-24 RX ADMIN — MIDAZOLAM 1 MG: 1 INJECTION INTRAMUSCULAR; INTRAVENOUS at 07:19

## 2022-01-24 RX ADMIN — OXYCODONE HYDROCHLORIDE 5 MG: 5 TABLET ORAL at 10:51

## 2022-01-24 RX ADMIN — DEXAMETHASONE SODIUM PHOSPHATE 10 MG: 10 INJECTION, SOLUTION INTRAMUSCULAR; INTRAVENOUS at 07:37

## 2022-01-24 RX ADMIN — FENTANYL CITRATE 50 MCG: 50 INJECTION INTRAMUSCULAR; INTRAVENOUS at 09:53

## 2022-01-24 RX ADMIN — FENTANYL CITRATE 50 MCG: 50 INJECTION INTRAMUSCULAR; INTRAVENOUS at 10:33

## 2022-01-24 RX ADMIN — ROCURONIUM BROMIDE 10 MG: 50 INJECTION, SOLUTION INTRAVENOUS at 08:34

## 2022-01-24 RX ADMIN — PHENYLEPHRINE HYDROCHLORIDE 100 MCG: 10 INJECTION INTRAVENOUS at 09:00

## 2022-01-24 RX ADMIN — FENTANYL CITRATE 25 MCG: 50 INJECTION, SOLUTION INTRAMUSCULAR; INTRAVENOUS at 09:33

## 2022-01-24 RX ADMIN — PHENYLEPHRINE HYDROCHLORIDE 100 MCG: 10 INJECTION INTRAVENOUS at 07:36

## 2022-01-24 RX ADMIN — FENTANYL CITRATE 50 MCG: 50 INJECTION INTRAMUSCULAR; INTRAVENOUS at 10:54

## 2022-01-24 RX ADMIN — LIDOCAINE HYDROCHLORIDE 60 MG: 20 INJECTION, SOLUTION INFILTRATION; PERINEURAL at 07:29

## 2022-01-24 RX ADMIN — ROCURONIUM BROMIDE 50 MG: 50 INJECTION, SOLUTION INTRAVENOUS at 07:30

## 2022-01-24 RX ADMIN — FENTANYL CITRATE 50 MCG: 50 INJECTION, SOLUTION INTRAMUSCULAR; INTRAVENOUS at 07:23

## 2022-01-24 RX ADMIN — SODIUM CHLORIDE, POTASSIUM CHLORIDE, SODIUM LACTATE AND CALCIUM CHLORIDE: 600; 310; 30; 20 INJECTION, SOLUTION INTRAVENOUS at 07:05

## 2022-01-24 RX ADMIN — PHENYLEPHRINE HYDROCHLORIDE 100 MCG: 10 INJECTION INTRAVENOUS at 07:49

## 2022-01-24 RX ADMIN — SUGAMMADEX 200 MG: 100 INJECTION, SOLUTION INTRAVENOUS at 09:22

## 2022-01-24 RX ADMIN — PROPOFOL 150 MG: 10 INJECTION, EMULSION INTRAVENOUS at 07:29

## 2022-01-24 RX ADMIN — ONDANSETRON 4 MG: 2 INJECTION INTRAMUSCULAR; INTRAVENOUS at 09:19

## 2022-01-24 RX ADMIN — CEFAZOLIN SODIUM 2 G: 2 INJECTION, SOLUTION INTRAVENOUS at 07:08

## 2022-01-24 RX ADMIN — FENTANYL CITRATE 25 MCG: 50 INJECTION, SOLUTION INTRAMUSCULAR; INTRAVENOUS at 08:36

## 2022-01-24 RX ADMIN — SODIUM CHLORIDE, POTASSIUM CHLORIDE, SODIUM LACTATE AND CALCIUM CHLORIDE: 600; 310; 30; 20 INJECTION, SOLUTION INTRAVENOUS at 09:39

## 2022-01-24 RX ADMIN — ACETAMINOPHEN 975 MG: 325 TABLET, FILM COATED ORAL at 10:51

## 2022-01-24 RX ADMIN — LIDOCAINE HYDROCHLORIDE 0.2 ML: 10 INJECTION, SOLUTION EPIDURAL; INFILTRATION; INTRACAUDAL; PERINEURAL at 07:04

## 2022-01-24 RX ADMIN — FLUORESCEIN SODIUM 0.33 ML: 100 INJECTION, SOLUTION OPHTHALMIC at 09:14

## 2022-01-24 RX ADMIN — MIDAZOLAM 1 MG: 1 INJECTION INTRAMUSCULAR; INTRAVENOUS at 07:15

## 2022-01-24 RX ADMIN — PROPOFOL 100 MCG/KG/MIN: 10 INJECTION, EMULSION INTRAVENOUS at 07:29

## 2022-01-24 NOTE — PROGRESS NOTES
VSS, afebrile.  Babb catheter removed and pt unable to void.  Will rest in room till the need to void and try again.

## 2022-01-24 NOTE — OP NOTE
OPERATIVE NOTE    SURGEON: Ranjith King M.D.    ASSISTANT:   Tevin Matt MD    (The assistance of this surgeon was required because of the need for additional skilled surgical hands- see the attached operative description for other details)        PREOPERATIVE DIAGNOSIS:   1. Pelvic pain and endometriosis  2. Severe dysmenorrhea        POSTOPERATIVE DIAGNOSIS:  Dysmenorrhea    PROCEDURES:    1. Total laparoscopic hysterectomy   2. Laparoscopic bilateral salpingectomy   3. Cystoscopy      ANESTHESIA:  General endotracheal anesthesia    PATIENT PROFILE: 36-year-old female  3 para 3 with history of prior endometriosis and recurrent pelvic pain consistent with endometriosis.  Failed conservative management.    OPERATIVE FINDINGS: The ovaries looked normal  .  There was no endometriosis seen on inspection of pelvis.  She did have some adhesions of her bladder from prior  sections.   The cystoscopy that was done after surgery was normal        OPERATIVE DESCRIPTION:  After the establishment of satisfactory general endotracheal anesthesia, the patient was prepped and draped in the usual fashion for laparoscopy and vaginal surgery. A Babb catheter was inserted in the urinary bladder. The KIM uterine manipulator was inserted in the uterus vaginally and then we directed our attention to the laparoscopy.     Dr. Matt made a 5 mm incision  Near  the umbilicus and inserted a Veress needle and insufflated the abdomen with CO2 gas and then inserted a 5 mm bladed trocar and inserted the laparoscope to verify correct placement.    There was no evidence of trauma from the port insertion. We then inserted two 5 mm bladed trochars under direct visualization below the level of the umbilicus and laterally on either side. I then went across the mesosalpinx  on the left side and Dr. Matt  did the same thing on the right side  to isolate the fallopian tubes   from their attachments and then took the LigaSure device  all the way down through the broad ligament and round ligament and then eventually to the anterior leaf of the broad ligament to mobilize the bladder flap off the lower uterine segment and cervix.  My partner Dr. Matt  assisted me by using the LigaSure device as well as holding the tissues steady so that I could use the LigaSure device because we needed additional skilled surgical hands to hold the tissues to separate them properly and safely.  We  then isolated the uterine arteries bilaterally with the LigaSure device. I then cut away the colpotomy along the KOH cup beveled edge. I then removed the uterus through the colpotomy opening. I then placed a bulb syringe and the vaginal opening and irrigated with sterile saline in the pelvis. There were several small areas of bleeding on the vaginal cuff and these were cauterized with the LigaSure device.     I then decided to close the cuff vaginally and I used 0 Vicryl sutures at each corner of the vaginal incision. These were interrupted figure-of-eight sutures. I then did a running locked 0 Vicryl closure of the vaginal cuff. I then returned to the laparoscopy to examine the vaginal cuff closure laparoscopically.       I then inserted the laparoscope again and examined the vaginal cuff and all of the pedicles for hemostasis.  I irrigated the pelvic area with normal saline and approximately  100 cc was used and approximately 100 cc was recovered and accounted for. The vaginal cuff was intact and hemostasis was excellent from the laparoscopic examination view point.    At that point, I expressed all of the CO2 gas from the abdomen and removed all of the trochars and repaired each of the skin incisions with 4-0 vicryl sutures in a subcuticular stitch. I then injected each of the incisions with 0.25% Marcaine with dilute epinephrine. A total of  10 cc was used.     I then performed cystoscopy and there was good urine flowing through both ureteral orifices and there was  no evidence of any sutures in the bladder.       I saw fluroescein dye spilling out of both ureteral orifices.      I then placed a B and O suppository for postop pain management.      She will be discharged home later today and I will call her in the next several days for follow-up. She will be instructed to come to the emergency room acutely if she develops any vomiting, increased pain, heavy vaginal bleeding or other concerns.    The estimated blood loss was  <50 cc. The final sponge and needle counts were reported to me as correct and there were no complications.         Ranjith King MD

## 2022-01-24 NOTE — ANESTHESIA CARE TRANSFER NOTE
Patient: Debbie Gage    Procedure: Procedure(s):  HYSTERECTOMY, TOTAL, LAPAROSCOPIC, WITH BILATERAL SALPINGECTOMY  Cystoscopy       Diagnosis: Pelvic pain in female [R10.2]  Diagnosis Additional Information: No value filed.    Anesthesia Type:   General     Note:    Oropharynx: oropharynx clear of all foreign objects and spontaneously breathing  Level of Consciousness: drowsy  Oxygen Supplementation: face mask  Level of Supplemental Oxygen (L/min / FiO2): 6  Independent Airway: airway patency satisfactory and stable  Dentition: dentition unchanged  Vital Signs Stable: post-procedure vital signs reviewed and stable  Report to RN Given: handoff report given  Patient transferred to: PACU    Handoff Report: Identifed the Patient, Identified the Reponsible Provider, Reviewed the pertinent medical history, Discussed the surgical course, Reviewed Intra-OP anesthesia mangement and issues during anesthesia, Set expectations for post-procedure period and Allowed opportunity for questions and acknowledgement of understanding      Vitals:  Vitals Value Taken Time   BP     Temp     Pulse 93 01/24/22 0933   Resp 13 01/24/22 0933   SpO2 100 % 01/24/22 0933   Vitals shown include unvalidated device data.    Electronically Signed By: CHRISSY Rodrigez CRNA  January 24, 2022  9:35 AM

## 2022-01-24 NOTE — DISCHARGE INSTRUCTIONS
Instructions from Dr King after your Total Laparoscopic Hysterectomy:    1.  You have an appointment to see Dr. King for postoperative care next week.  He will also plan to call you tomorrow on the day after surgery to check on your progress.  The call will occur sometime during the day.    2. If you should develop any concerns, such as heavy vaginal bleeding, fever, vomiting or increasing pain, you should call his nurse at the above number to be worked in for an appointment, or go to the emergency room if after hours or on a weekend.    3. You should expect some pain for the first several weeks which should gradually decrease day by day. Use your pain medication as needed. The narcotic medication will likely  make you constipated so be somewhat careful about using this. In general we expect that you should NOT be using the narcotic medication after the first week after surgery is over.Try to use the ibuprofen first and then the narcotic if you need more pain relief.    4. Expect that you might see some vaginal bleeding- mostly spotting- but perhaps you may pass a clot or two in the first several weeks after surgery- This can actually occur anytime in the first 3 months after this type of surgery. Mild clotting/spotting is ok but something like a heavy menstrual period should definitely be reported right away.. If the bleeding seems to be more than that, please let Dr King know about it. Also watch for signs of infection, which would be fever or redness at the incision sites if there are any incisions, or foul smelling vaginal discharge.    5. Avoid intercourse until your 6 week post-operative exam. Don't lift anything over 20 lbs for 12  weeks after your surgery.    6. If you have any questions or concerns please don't hesitate to contact Dr King- call [phone].         Ranjith King MD, FACOG, FAAFP              , OB/GYN  Department    [facility]    Adult Discharge Orders & Instructions after anesthesia    For 24 hours after surgery    1. Get plenty of rest.  A responsible adult must stay with you for at least 24 hours after you leave the hospital.   2. Do not drive or use heavy equipment.  If you have weakness or tingling, don't drive or use heavy equipment until this feeling goes away.  3. Do not drink alcohol.  4. Avoid strenuous or risky activities.  Ask for help when climbing stairs.   5. You may feel lightheaded.  If so, sit for a few minutes before standing.  Have someone help you get up.   6. You may have a slight fever. Call the doctor if your fever is over 100 F (37.7 C) (taken under the tongue) or lasts longer than 24 hours.  7. You may have a dry mouth, a sore throat, muscle aches or trouble sleeping.  These should go away after 24 hours.  8. Do not make important or legal decisions.  We don t expect you to have any problems from the surgery or treatment you had today. Just in case, here s what to do if you have pain, upset stomach (nausea), bleeding or infection:  Pain:  Take medicines your doctor has prescribed or over-the-counter medicine they have suggested. Resting and using ice packs can help, too. For surgery on an arm or leg, raise it on a pillow to ease swelling. Call your doctor if these methods don t work.  Copyright Edu[employer], Licensed under [employer]  Upset stomach (nausea):  Take anti-nausea medicine approved by your doctor. Drink clear liquids like apple juice, ginger ale, broth or 7-Up. Be sure to drink enough fluids. Rest can help, too. Move to normal foods when you re ready.   Bleeding:  In the first 24 hours, you may see a little blood on your dressing, about the size of a quarter. You don t need to worry about this much blood, but if the blood spot keeps getting bigger:    Put pressure on the wound if you can, AND    Call your doctor.  Copyright [employer], Licensed under [employer]  Fever/Infection:  Please call your doctor if you have any of these signs:    Redness    Swelling    Wound feels warm    Pain gets worse    Bad-smelling fluid leaks from wound    Fever or chills  Call your doctor for any of the followin.  It has been over 8 to 10 hours since surgery and you are still not able to urinate (pass water).    2.  Headache for over 24 hours.    Nurse advice line: [phone]

## 2022-01-24 NOTE — ANESTHESIA POSTPROCEDURE EVALUATION
Patient: Debbie Gage    Procedure: Procedure(s):  HYSTERECTOMY, TOTAL, LAPAROSCOPIC, WITH BILATERAL SALPINGECTOMY  Cystoscopy       Diagnosis:Pelvic pain in female [R10.2]  Diagnosis Additional Information: No value filed.    Anesthesia Type:  General    Note:  Disposition: Outpatient   Postop Pain Control: Uneventful            Sign Out: Well controlled pain   PONV: No   Neuro/Psych: Uneventful            Sign Out: Acceptable/Baseline neuro status   Airway/Respiratory: Uneventful            Sign Out: Acceptable/Baseline resp. status   CV/Hemodynamics: Uneventful            Sign Out: Acceptable CV status   Other NRE: NONE   DID A NON-ROUTINE EVENT OCCUR? No    Event details/Postop Comments:  Pt was happy with anesthesia care.  No complications.  I will follow up with the pt if needed.           Last vitals:  Vitals Value Taken Time   BP 93/58 01/24/22 1020   Temp 97.52  F (36.4  C) 01/24/22 1021   Pulse 71 01/24/22 1020   Resp 15 01/24/22 1020   SpO2 99 % 01/24/22 1020   Vitals shown include unvalidated device data.    Electronically Signed By: CHRISSY Coe CRNA  January 24, 2022  1:02 PM

## 2022-01-24 NOTE — ANESTHESIA PROCEDURE NOTES
Airway       Patient location during procedure: OR       Procedure Start/Stop Times: 1/24/2022 7:32 AM  Staff -        CRNA: Ronn Perez APRN CRNA       Other Anesthesia Staff: Leola Montes       Performed By: CRNA and BONNIE  Consent for Airway        Urgency: elective  Indications and Patient Condition       Indications for airway management: mandie-procedural       Induction type:intravenous       Mask difficulty assessment: 1 - vent by mask    Final Airway Details       Final airway type: endotracheal airway       Successful airway: ETT - single and Oral  Endotracheal Airway Details        ETT size (mm): 6.5       Cuffed: yes       Cuff volume (mL): 8       Successful intubation technique: direct laryngoscopy       DL Blade Type: Coleman 2       Grade View of Cords: 1       Adjucts: stylet       Position: Right       Measured from: lips       Secured at (cm): 23    Post intubation assessment        Placement verified by: capnometry, equal breath sounds and chest rise        Number of attempts at approach: 1       Secured with: silk tape       Ease of procedure: easy       Dentition: Unchanged and Intact    Additional Comments       DL performed by Leola NICOLE under direct supervision of Ronan Perez CRNA

## 2022-01-25 LAB
PATH REPORT.COMMENTS IMP SPEC: NORMAL
PATH REPORT.COMMENTS IMP SPEC: NORMAL
PATH REPORT.FINAL DX SPEC: NORMAL
PATH REPORT.GROSS SPEC: NORMAL
PATH REPORT.MICROSCOPIC SPEC OTHER STN: NORMAL
PATH REPORT.RELEVANT HX SPEC: NORMAL
PHOTO IMAGE: NORMAL

## 2022-01-25 PROCEDURE — 88307 TISSUE EXAM BY PATHOLOGIST: CPT | Mod: 26 | Performed by: PATHOLOGY

## 2022-01-31 ENCOUNTER — OFFICE VISIT (OUTPATIENT)
Dept: FAMILY MEDICINE | Facility: CLINIC | Age: 37
End: 2022-01-31
Payer: COMMERCIAL

## 2022-01-31 VITALS
DIASTOLIC BLOOD PRESSURE: 60 MMHG | SYSTOLIC BLOOD PRESSURE: 112 MMHG | BODY MASS INDEX: 22.66 KG/M2 | HEART RATE: 74 BPM | OXYGEN SATURATION: 100 % | RESPIRATION RATE: 18 BRPM | TEMPERATURE: 98.2 F | WEIGHT: 132 LBS

## 2022-01-31 DIAGNOSIS — Z98.890 POSTOPERATIVE STATE: Primary | ICD-10-CM

## 2022-01-31 PROCEDURE — 99024 POSTOP FOLLOW-UP VISIT: CPT | Performed by: OBSTETRICS & GYNECOLOGY

## 2022-01-31 ASSESSMENT — PAIN SCALES - GENERAL: PAINLEVEL: MILD PAIN (3)

## 2022-01-31 NOTE — PROGRESS NOTES
Debbie is here for 7 day   postop check from laparoscopic  hysterectomy. She offers no concerns.  She reports adequate pain control and normal bowel and bladder function. No fevers.     Objective: Vital signs: Blood pressure 112/60, pulse 74, temperature 98.2  F (36.8  C), temperature source Temporal, resp. rate 18, weight 59.9 kg (132 lb), last menstrual period 01/19/2022, SpO2 100 %, not currently breastfeeding.      Chest is clear to ausculatation. Cardiac exam is normal. Abdomen is soft and not distended. Normal bowel sounds heard. Incisions are clean and dry and intact-healing well.    Assessment: Normal 7 day  post op check.    Plan: pathology  was benign.   Recheck in  5 weeks   , sooner if wound becomes red or tender or fever or any other concerns. Limit lifting to 10 lbs for a total of 12 weeks.No intercourse until 6 weeks out from surgery.  SRUTHI King MD

## 2022-02-10 ENCOUNTER — NURSE TRIAGE (OUTPATIENT)
Dept: FAMILY MEDICINE | Facility: CLINIC | Age: 37
End: 2022-02-10
Payer: COMMERCIAL

## 2022-02-10 NOTE — TELEPHONE ENCOUNTER
I spoke with megan. She has a pinkish vaginal discharge and dull pain over her bladder area. No fever or foul smelling discharge or any vomiting or other concerns- actually feels fairly good overall. Just worried abouit the spotting. I reassured her that this is normal- I asked her to keep an eye on the sx and call if it gets worse or if she gets a fever or foul smelling discharge- SRUTHI King MD

## 2022-02-10 NOTE — TELEPHONE ENCOUNTER
"Patient reports some mild, dull lower abdominal pain and is having some pinkish vaginal discharge. Her hysterectomy was on 1/24/22 and she is concerned. She is wondering if these are symptoms to be concerned about of if she needs to follow up? Please advise.     Clifford Glavez RN    Reason for Disposition    [1] Bleeding/spotting after procedure (e.g., biopsy) or pelvic examination (e.g., pap smear) AND [2] lasts > 7 days    Additional Information    Negative: Shock suspected (e.g., cold/pale/clammy skin, too weak to stand, low BP, rapid pulse)    Negative: Difficult to awaken or acting confused (e.g., disoriented, slurred speech)    Negative: Passed out (i.e., lost consciousness, collapsed and was not responding)    Negative: Sounds like a life-threatening emergency to the triager    Negative: SEVERE abdominal pain    Negative: SEVERE dizziness (e.g., unable to stand, requires support to walk, feels like passing out now)    Negative: Sexual assault or rape    Negative: SEVERE vaginal bleeding (e.g., soaking 2 pads or tampons per hour and present 2 or more hours)    Negative: Patient sounds very sick or weak to the triager    Negative: MODERATE vaginal bleeding (i.e., soaking 1 pad or tampon per hour and present > 6 hours)    Negative: Pale skin (pallor) of new onset or worsening    Negative: [1] Constant abdominal pain AND [2] present > 2 hours    Answer Assessment - Initial Assessment Questions  1. AMOUNT: \"Describe the bleeding that you are having.\" \"How much bleeding is there?\"     - SPOTTING: spotting, or pinkish / brownish mucous discharge; does not fill panti-liner or pad     - MILD:  less than 1 pad / hour; less than patient's  menstrual bleeding when she still had menstrual periods    - MODERATE: 1-2 pads / hour; small-medium blood clots (e.g., pea, grape, small coin)     - SEVERE: soaking 2 or more pads/hour for 2 or more hours; bleeding not contained by pads or continuous red blood from vagina; large blood " "clots (e.g., golf ball, large coin)       Spotting, pinkinsh  2. ONSET: \"When did the bleeding begin?\" \"Is it continuing now?\"      Yesterday  3. MENOPAUSE: \"When was your last menstrual period?\"       Recent hysterectomy 1/24  4. ABDOMINAL PAIN: \"Do you have any pain?\" \"How bad is the pain?\"  (e.g., Scale 1-10; mild, moderate, or severe)    - MILD (1-3): doesn't interfere with normal activities, abdomen soft and not tender to touch     - MODERATE (4-7): interferes with normal activities or awakens from sleep, tender to touch     - SEVERE (8-10): excruciating pain, doubled over, unable to do any normal activities       Mild, dull [pain  5. BLOOD THINNERS: \"Do you take any blood thinners?\" (e.g., Coumadin/warfarin, Pradaxa/dabigatran, aspirin)      No  6. HORMONES: \"Are you taking any hormone medications, prescription or OTC?\" (e.g., birth control pills, estrogen)      No  7. CAUSE: \"What do you think is causing the bleeding?\" (e.g., recent gyn surgery, recent gyn procedure; known bleeding disorder, uterine cancer)        Recent gyn procedure  8. HEMODYNAMIC STATUS: \"Are you weak or feeling lightheaded?\" If so, ask: \"Can you stand and walk normally?\"        No  9. OTHER SYMPTOMS: \"What other symptoms are you having with the bleeding?\" (e.g., back pain, burning with urination, fever)      Mild abdominal pain    Protocols used: VAGINAL BLEEDING - [id number]      "

## 2022-02-28 ENCOUNTER — PATIENT OUTREACH (OUTPATIENT)
Dept: FAMILY MEDICINE | Facility: CLINIC | Age: 37
End: 2022-02-28
Payer: COMMERCIAL

## 2022-02-28 PROCEDURE — 99207 PR NO CHARGE LOS: CPT | Performed by: OBSTETRICS & GYNECOLOGY

## 2022-02-28 NOTE — TELEPHONE ENCOUNTER
Subjective: Debbie requested a phone consultation today because of concerns regarding spotting . Due to the current covid-19 coronavirus epidemic we are managing much of our patients' concerns remotely when possible.    She had a hysterectomy 5 weeks ago.  She has had some spotting and brownish vaginal discharge.  Has some odor to it.  No fevers.    The past medical history and medications and allergies have been reviewed today by me.  .   Past Medical History:   Diagnosis Date     Endometriosis      S/P LEEP (loop electrosurgical excision procedure) 2010     Allergies   Allergen Reactions     Sulfa Drugs Hives     Current Outpatient Medications   Medication Sig Dispense Refill     acetaminophen (TYLENOL) 325 MG tablet Take 2 tablets (650 mg) by mouth every 6 hours as needed for mild pain 50 tablet 0     ibuprofen (ADVIL/MOTRIN) 800 MG tablet Take 1 tablet (800 mg) by mouth every 6 hours as needed for other (mild and/or inflammatory pain) 30 tablet 0     ondansetron (ZOFRAN-ODT) 4 MG ODT tab Take 1-2 tablets (4-8 mg) by mouth every 8 hours as needed for nausea 4 tablet 0     oxyCODONE (ROXICODONE) 5 MG tablet Take 1-2 tablets (5-10 mg) by mouth every 6 hours as needed for moderate to severe pain 12 tablet 0     senna-docusate (SENOKOT-S/PERICOLACE) 8.6-50 MG tablet Take 1-2 tablets by mouth 2 times daily 30 tablet 0       Assessment/Plan:  Vaginal spotting status post hysterectomy 5 weeks ago.  I will work her in to see me this Wednesday and asked her to call if the bleeding becomes more noticeable and at that point I will work her in either today or tomorrow.  She was okay with that approach.  She did promise to call if the bleeding intensifies but at this point it is dark brown blood so I suspect it is old and not very acute.      Phone call duration was   3  minutes.     SRUTHI King MD

## 2022-03-02 ENCOUNTER — OFFICE VISIT (OUTPATIENT)
Dept: FAMILY MEDICINE | Facility: CLINIC | Age: 37
End: 2022-03-02
Payer: COMMERCIAL

## 2022-03-02 VITALS
HEIGHT: 64 IN | WEIGHT: 127 LBS | TEMPERATURE: 98.2 F | RESPIRATION RATE: 18 BRPM | OXYGEN SATURATION: 100 % | SYSTOLIC BLOOD PRESSURE: 106 MMHG | HEART RATE: 74 BPM | DIASTOLIC BLOOD PRESSURE: 68 MMHG | BODY MASS INDEX: 21.68 KG/M2

## 2022-03-02 DIAGNOSIS — Z98.890 POSTOPERATIVE STATE: Primary | ICD-10-CM

## 2022-03-02 PROCEDURE — 99024 POSTOP FOLLOW-UP VISIT: CPT | Performed by: OBSTETRICS & GYNECOLOGY

## 2022-03-02 ASSESSMENT — PAIN SCALES - GENERAL: PAINLEVEL: NO PAIN (0)

## 2022-03-02 NOTE — PROGRESS NOTES
Subjective:    She had total laparoscopic hysterectomy 5 weeks ago and is spotting.  Otherwise no concerns.  Voiding well.  Having bowel movements.      The past medical history, social history, past surgical history and family history as shown below have been reviewed by me today.    Past Medical History:   Diagnosis Date     Endometriosis      S/P LEEP (loop electrosurgical excision procedure)         Allergies   Allergen Reactions     Sulfa Drugs Hives     Current Outpatient Medications   Medication Sig Dispense Refill     acetaminophen (TYLENOL) 325 MG tablet Take 2 tablets (650 mg) by mouth every 6 hours as needed for mild pain 50 tablet 0     ibuprofen (ADVIL/MOTRIN) 800 MG tablet Take 1 tablet (800 mg) by mouth every 6 hours as needed for other (mild and/or inflammatory pain) 30 tablet 0     ondansetron (ZOFRAN-ODT) 4 MG ODT tab Take 1-2 tablets (4-8 mg) by mouth every 8 hours as needed for nausea 4 tablet 0     oxyCODONE (ROXICODONE) 5 MG tablet Take 1-2 tablets (5-10 mg) by mouth every 6 hours as needed for moderate to severe pain 12 tablet 0     senna-docusate (SENOKOT-S/PERICOLACE) 8.6-50 MG tablet Take 1-2 tablets by mouth 2 times daily 30 tablet 0     Past Surgical History:   Procedure Laterality Date     AS ENLARGE BREAST WITH IMPLANT       silicone implants      SECTION      X2      SECTION N/A 2018    Procedure:  SECTION;   SECTION;  Surgeon: Ranjith King MD;  Location: [facility]     CYSTECTOMY OVARIAN BENIGN       CYSTOSCOPY N/A 2022    Procedure: Cystoscopy;  Surgeon: Ranjith King MD;  Location: [facility]     DILATION AND CURETTAGE, HYSTEROSCOPY, LAPAROSCOPY, COMBINED N/A 3/6/2017    Procedure: COMBINED DILATION AND CURETTAGE, HYSTEROSCOPY, LAPAROSCOPY;  Surgeon: Ranjith King MD;  Location: [facility]     ENT SURGERY      wisdom teeth extraction     GYN SURGERY      laparoscopy x 4     LAPAROSCOPIC ABLATION  ENDOMETRIOSIS  3/6/2017    Procedure: LAPAROSCOPIC ABLATION ENDOMETRIOSIS;  Surgeon: Ranjith King MD;  Location: [facility]     LAPAROSCOPIC HYSTERECTOMY TOTAL, SALPINGECTOMY BILATERAL Bilateral 1/24/2022    Procedure: HYSTERECTOMY, TOTAL, LAPAROSCOPIC, WITH BILATERAL SALPINGECTOMY;  Surgeon: Ranjith King MD;  Location: [facility]     LAPAROSCOPIC LYSIS ADHESIONS N/A 3/6/2017    Procedure: LAPAROSCOPIC LYSIS ADHESIONS;  Surgeon: Ranjith King MD;  Location: [facility]     LEEP TX, CERVICAL  2010     ORTHOPEDIC SURGERY      ankle edwin surgery (tendon repair)     Social History     Socioeconomic History     Marital status:      Spouse name: None     Number of children: None     Years of education: None     Highest education level: None   Occupational History     None   Tobacco Use     Smoking status: Never Smoker     Smokeless tobacco: Current User   Vaping Use     Vaping Use: Never used   Substance and Sexual Activity     Alcohol use: Yes     Alcohol/week: 2.0 standard drinks     Types: 2 Glasses of wine per week     Comment: occas     Drug use: No     Sexual activity: Yes     Partners: Male     Birth control/protection: Condom   Other Topics Concern     Parent/sibling w/ CABG, MI or angioplasty before 65F 55M? Not Asked   Social History Narrative    1/2018  Lives in [place] with Pradeep and their two sons.  No smokers in the home.  No indoor cats/kittens.  No concerns about domestic violence.  Debbie is a .          Social Determinants of Health     Financial Resource Strain: Not on file   Food Insecurity: Not on file   Transportation Needs: Not on file   Physical Activity: Not on file   Stress: Not on file   Social Connections: Not on file   Intimate Partner Violence: Not on file   Housing Stability: Not on file     Family History   Problem Relation Age of Onset     Other Cancer Maternal Grandmother         ovarian     Diabetes Maternal Grandmother      Diabetes  "Maternal Grandfather      Colon Cancer Paternal Grandfather      Seasonal/Environmental Allergies Son      Allergy (Severe) Brother         dairy and shellfish     Asthma Brother        ROS: A 12 point review of systems was done. Except for what is listed above in the HPI, the systems review is negative .      Objective: Vital signs: Blood pressure 106/68, pulse 74, temperature 98.2  F (36.8  C), temperature source Temporal, resp. rate 18, height 1.626 m (5' 4\"), weight 57.6 kg (127 lb), SpO2 100 %, not currently breastfeeding.    Pelvic exam done with my nurse Evelyn present.  External genitalia look normal.  Vaginal vault had some small amount of brownish vaginal discharge.  The exam of the vaginal cuff appears that it is healing well except that there was one small area of a raw cuff edge that was spotting.  I did cauterize it with silver nitrate.      Assessment/Plan:     1.  Normal postoperative healing.  Small spotting at vaginal cuff.  I cauterized with silver nitrate and told her to expect that she may have some continued brownish discharge and possibly even some spotting over the next week.  She will see me again next week for recheck.  Sooner if concerns.             The above information was dictating using Dragon voice software and as a result there may be some irregularities that were not detected in my review of this note.    SRUTHI King MD              "

## 2022-03-07 ENCOUNTER — OFFICE VISIT (OUTPATIENT)
Dept: FAMILY MEDICINE | Facility: CLINIC | Age: 37
End: 2022-03-07
Payer: COMMERCIAL

## 2022-03-07 VITALS
TEMPERATURE: 98.5 F | SYSTOLIC BLOOD PRESSURE: 110 MMHG | OXYGEN SATURATION: 99 % | BODY MASS INDEX: 21.85 KG/M2 | RESPIRATION RATE: 18 BRPM | HEART RATE: 94 BPM | HEIGHT: 64 IN | WEIGHT: 128 LBS | DIASTOLIC BLOOD PRESSURE: 66 MMHG

## 2022-03-07 DIAGNOSIS — Z98.890 POSTOPERATIVE STATE: Primary | ICD-10-CM

## 2022-03-07 PROCEDURE — 99024 POSTOP FOLLOW-UP VISIT: CPT | Performed by: OBSTETRICS & GYNECOLOGY

## 2022-03-07 ASSESSMENT — PAIN SCALES - GENERAL: PAINLEVEL: NO PAIN (0)

## 2022-03-07 NOTE — PROGRESS NOTES
"Debbie is here for 6 week postop check from total laparoscopic  hysterectomy.The ovaries were  retained  . She offers no concerns.  She had spotting last week and I did cauterize it and the spotting has resolved.  She reports adequate pain control and normal bowel and bladder function.  No fevers.    Objective:  Vital signs are stable as shown in chart. Blood pressure 110/66, pulse 94, temperature 98.5  F (36.9  C), temperature source Temporal, resp. rate 18, height 1.626 m (5' 4\"), weight 58.1 kg (128 lb), last menstrual period 01/19/2022, SpO2 99 %, not currently breastfeeding.       Chest is clear to ausculatation. Cardiac exam is normal. Abdomen is soft and not distended. Normal bowel sounds heard. Incisions are clean and dry and intact-healing well. The pelvic exam reveals that the vaginal cuff is healing well.  I did see 1 small spot of bleeding on the cuff edge and I cauterized it with silver nitrate again today.  No other abnormal findings noted.   My nurse was present for the exam.      Assessment: Normal 6 week  post op check for total laparoscopic hysterectomy.     Plan: pathology was benign.     Recheck prn   ,or if wound becomes red or tender or fever or any other concerns. Limit lifting to 20 lbs for 12   full weeks. HRT not needed.    SRUTHI King MD  "

## 2022-09-03 ENCOUNTER — HEALTH MAINTENANCE LETTER (OUTPATIENT)
Age: 37
End: 2022-09-03

## 2023-01-14 ENCOUNTER — HEALTH MAINTENANCE LETTER (OUTPATIENT)
Age: 38
End: 2023-01-14

## 2024-02-17 ENCOUNTER — HEALTH MAINTENANCE LETTER (OUTPATIENT)
Age: 39
End: 2024-02-17

## 2025-03-08 ENCOUNTER — HEALTH MAINTENANCE LETTER (OUTPATIENT)
Age: 40
End: 2025-03-08

## (undated) DEVICE — PACK SET-UP STD 9102

## (undated) DEVICE — PACK C-SECTION

## (undated) DEVICE — KIT PATIENT POSITIONING PIGAZZI LATEX FREE 40580

## (undated) DEVICE — BLADE KNIFE SURG 11 371111

## (undated) DEVICE — SOL WATER IRRIG 1000ML BOTTLE 07139-09

## (undated) DEVICE — SYR 50ML LL W/O NDL 309653

## (undated) DEVICE — SU VICRYL 4-0 PS-2 27" UND J426H

## (undated) DEVICE — DEVICE SUTURE GRASPER TROCAR CLOSURE 14GA PMITCSG

## (undated) DEVICE — Device

## (undated) DEVICE — SU VICRYL 3-0 CP-2 27" UND J868H

## (undated) DEVICE — SOL ADH LIQUID BENZOIN SWAB 0.6ML C1544

## (undated) DEVICE — PAD PERI INDIV WRAP 11" 2022A

## (undated) DEVICE — GLOVE PROTEXIS W/NEU-THERA 7.5  2D73TE75

## (undated) DEVICE — SU MONOCRYL 3-0 KS 27" UND Y523H

## (undated) DEVICE — SYR BULB IRRIG DOVER 60 ML LATEX FREE 67000

## (undated) DEVICE — DEVICE RUMI II KOH-EFFICIENT 3.0CM KC-RUMI-30

## (undated) DEVICE — SOL NACL 0.9% IRRIG 1000ML BOTTLE 07138-09

## (undated) DEVICE — DRSG BANDAID 2X3 1/2" FABRIC

## (undated) DEVICE — DRSG STERI STRIP 1/2X4" R1547

## (undated) DEVICE — PREP CHLORAPREP 26ML TINTED ORANGE  260815

## (undated) DEVICE — DRSG ABDOMINAL 07 1/2X8" 7197D

## (undated) DEVICE — NDL SPINAL 22GA 5" QUINCKE 405148

## (undated) DEVICE — SU VICRYL 0 CT-1 36" J346H

## (undated) DEVICE — SYR 10ML FINGER CONTROL W/O NDL 309695

## (undated) DEVICE — NDL INSUFFLATION 120MM VERRES 172015

## (undated) DEVICE — STOCKING SLEEVE COMPRESSION CALF MED

## (undated) DEVICE — DRSG BANDAID 1X3" FABRIC

## (undated) DEVICE — SU PLAIN 0 FN-2 27" N864H

## (undated) DEVICE — SOL NACL 0.9% INJ 1000ML BAG 07983-09

## (undated) DEVICE — NDL ECLIPSE 25GA 1.5"

## (undated) DEVICE — CATH TRAY FOLEY 16FR DRAINAGE BAG STATLOCK 899916

## (undated) DEVICE — PACK AB HYST/LITHOTOMY CUSTOM NORTHLAND

## (undated) DEVICE — UTERINE MANIPULATOR RUMI 6.7MMX8CM UMB678

## (undated) DEVICE — SU VICRYL 3-0 PS-2 27" UND J427H

## (undated) DEVICE — ENDO TROCAR SLEEVE KII Z-THREADED 05X100MM CTS02

## (undated) DEVICE — APPLICATOR SILVER NITRATE 6"

## (undated) DEVICE — LUBRICATING JELLY 4.25OZ

## (undated) DEVICE — SYR 10ML PREFILLED 0.9% NACL INJ NOT STERILE 306500

## (undated) DEVICE — DRSG TEGADERM 6X8" 1628

## (undated) DEVICE — PREP SCRUB SOL EXIDINE 4% CHG 4OZ 29002-404

## (undated) DEVICE — CLAMP CORD

## (undated) DEVICE — ESU GROUND PAD UNIVERSAL W/O CORD

## (undated) DEVICE — PACK LITHOTOMY CUSTOM

## (undated) DEVICE — DRAPE POUCH INSTRUMENT 3 POCKET 1018L

## (undated) DEVICE — ENDO TROCAR SHIELDED BLADED KII Z-THRD 05X100MM CTB03

## (undated) DEVICE — TUBING INSUFFLATION W/FILTER 10FT GS1016

## (undated) DEVICE — SUCTION CURETTE 3MM ENDOMETRIAL MX140

## (undated) DEVICE — ESU LIGASURE BLUNT TIP 5MM LF1537

## (undated) DEVICE — TUBING CYSTO/BLADDER IRRIG SET 80" 06544-01

## (undated) DEVICE — ENDO FIXATION CANNULA 05MM VERSAPORT 177092F

## (undated) DEVICE — PREP CHLORAPREP 1.5ML CLR

## (undated) DEVICE — NDL INSUFFLATION 13GA 120MM C2201

## (undated) DEVICE — NDL COUNTER 20CT 31142493

## (undated) DEVICE — ENDO TROCAR 05MM VERSAPORT BLADED W/FIX CANNULA 179094F

## (undated) DEVICE — SYR 05ML LL W/O NDL

## (undated) DEVICE — ANTIFOG SOLUTION W/FOAM PAD 31142527

## (undated) RX ORDER — BUPIVACAINE HYDROCHLORIDE AND EPINEPHRINE 2.5; 5 MG/ML; UG/ML
INJECTION, SOLUTION EPIDURAL; INFILTRATION; INTRACAUDAL; PERINEURAL
Status: DISPENSED
Start: 2017-03-06

## (undated) RX ORDER — DEXAMETHASONE SODIUM PHOSPHATE 10 MG/ML
INJECTION INTRAMUSCULAR; INTRAVENOUS
Status: DISPENSED
Start: 2018-08-21

## (undated) RX ORDER — METHYLERGONOVINE MALEATE 0.2 MG/ML
INJECTION INTRAVENOUS
Status: DISPENSED
Start: 2018-08-21

## (undated) RX ORDER — DEXAMETHASONE SODIUM PHOSPHATE 10 MG/ML
INJECTION, SOLUTION INTRAMUSCULAR; INTRAVENOUS
Status: DISPENSED
Start: 2022-01-24

## (undated) RX ORDER — FENTANYL CITRATE-0.9 % NACL/PF 10 MCG/ML
PLASTIC BAG, INJECTION (ML) INTRAVENOUS
Status: DISPENSED
Start: 2022-01-24

## (undated) RX ORDER — LIDOCAINE HYDROCHLORIDE 20 MG/ML
INJECTION, SOLUTION EPIDURAL; INFILTRATION; INTRACAUDAL; PERINEURAL
Status: DISPENSED
Start: 2022-01-24

## (undated) RX ORDER — ONDANSETRON 2 MG/ML
INJECTION INTRAMUSCULAR; INTRAVENOUS
Status: DISPENSED
Start: 2018-08-21

## (undated) RX ORDER — BUPIVACAINE HYDROCHLORIDE AND EPINEPHRINE 2.5; 5 MG/ML; UG/ML
INJECTION, SOLUTION EPIDURAL; INFILTRATION; INTRACAUDAL; PERINEURAL
Status: DISPENSED
Start: 2022-01-24

## (undated) RX ORDER — PROPOFOL 10 MG/ML
INJECTION, EMULSION INTRAVENOUS
Status: DISPENSED
Start: 2017-03-06

## (undated) RX ORDER — CEFAZOLIN SODIUM 1 G/3ML
INJECTION, POWDER, FOR SOLUTION INTRAMUSCULAR; INTRAVENOUS
Status: DISPENSED
Start: 2017-03-06

## (undated) RX ORDER — ONDANSETRON 2 MG/ML
INJECTION INTRAMUSCULAR; INTRAVENOUS
Status: DISPENSED
Start: 2022-01-24

## (undated) RX ORDER — FENTANYL CITRATE 50 UG/ML
INJECTION, SOLUTION INTRAMUSCULAR; INTRAVENOUS
Status: DISPENSED
Start: 2018-08-21

## (undated) RX ORDER — PHENYLEPHRINE HCL IN 0.9% NACL 1 MG/10 ML
SYRINGE (ML) INTRAVENOUS
Status: DISPENSED
Start: 2018-08-21

## (undated) RX ORDER — FENTANYL CITRATE 50 UG/ML
INJECTION, SOLUTION INTRAMUSCULAR; INTRAVENOUS
Status: DISPENSED
Start: 2022-01-24

## (undated) RX ORDER — EPHEDRINE SULFATE 50 MG/ML
INJECTION, SOLUTION INTRAMUSCULAR; INTRAVENOUS; SUBCUTANEOUS
Status: DISPENSED
Start: 2017-03-06

## (undated) RX ORDER — FENTANYL CITRATE 50 UG/ML
INJECTION, SOLUTION INTRAMUSCULAR; INTRAVENOUS
Status: DISPENSED
Start: 2017-03-06

## (undated) RX ORDER — PROPOFOL 10 MG/ML
INJECTION, EMULSION INTRAVENOUS
Status: DISPENSED
Start: 2022-01-24

## (undated) RX ORDER — BUPIVACAINE HYDROCHLORIDE 7.5 MG/ML
INJECTION, SOLUTION EPIDURAL; RETROBULBAR
Status: DISPENSED
Start: 2018-08-21

## (undated) RX ORDER — ATROPA BELLADONNA AND OPIUM 16.2; 3 MG/1; MG/1
SUPPOSITORY RECTAL
Status: DISPENSED
Start: 2022-01-24